# Patient Record
Sex: FEMALE | Race: WHITE
[De-identification: names, ages, dates, MRNs, and addresses within clinical notes are randomized per-mention and may not be internally consistent; named-entity substitution may affect disease eponyms.]

---

## 2020-09-25 ENCOUNTER — HOSPITAL ENCOUNTER (INPATIENT)
Dept: HOSPITAL 75 - ER | Age: 85
LOS: 3 days | Discharge: HOME | DRG: 292 | End: 2020-09-28
Attending: FAMILY MEDICINE | Admitting: FAMILY MEDICINE
Payer: MEDICARE

## 2020-09-25 VITALS — SYSTOLIC BLOOD PRESSURE: 209 MMHG | DIASTOLIC BLOOD PRESSURE: 117 MMHG

## 2020-09-25 VITALS — BODY MASS INDEX: 31.64 KG/M2 | HEIGHT: 62.99 IN | WEIGHT: 178.57 LBS

## 2020-09-25 VITALS — SYSTOLIC BLOOD PRESSURE: 148 MMHG | DIASTOLIC BLOOD PRESSURE: 87 MMHG

## 2020-09-25 VITALS — SYSTOLIC BLOOD PRESSURE: 133 MMHG | DIASTOLIC BLOOD PRESSURE: 83 MMHG

## 2020-09-25 DIAGNOSIS — J96.10: ICD-10-CM

## 2020-09-25 DIAGNOSIS — Z99.81: ICD-10-CM

## 2020-09-25 DIAGNOSIS — Z83.3: ICD-10-CM

## 2020-09-25 DIAGNOSIS — J44.1: ICD-10-CM

## 2020-09-25 DIAGNOSIS — E87.6: ICD-10-CM

## 2020-09-25 DIAGNOSIS — I50.33: ICD-10-CM

## 2020-09-25 DIAGNOSIS — F17.210: ICD-10-CM

## 2020-09-25 DIAGNOSIS — F17.200: ICD-10-CM

## 2020-09-25 DIAGNOSIS — Z20.828: ICD-10-CM

## 2020-09-25 DIAGNOSIS — E03.9: ICD-10-CM

## 2020-09-25 DIAGNOSIS — I11.0: Primary | ICD-10-CM

## 2020-09-25 DIAGNOSIS — H35.30: ICD-10-CM

## 2020-09-25 DIAGNOSIS — I48.91: ICD-10-CM

## 2020-09-25 LAB
ALBUMIN SERPL-MCNC: 4 GM/DL (ref 3.2–4.5)
ALP SERPL-CCNC: 81 U/L (ref 40–136)
ALT SERPL-CCNC: 22 U/L (ref 0–55)
BASOPHILS # BLD AUTO: 0 10^3/UL (ref 0–0.1)
BASOPHILS NFR BLD AUTO: 0 % (ref 0–10)
BILIRUB SERPL-MCNC: 0.6 MG/DL (ref 0.1–1)
BUN/CREAT SERPL: 17
CALCIUM SERPL-MCNC: 8.8 MG/DL (ref 8.5–10.1)
CHLORIDE SERPL-SCNC: 102 MMOL/L (ref 98–107)
CO2 SERPL-SCNC: 23 MMOL/L (ref 21–32)
CREAT SERPL-MCNC: 0.72 MG/DL (ref 0.6–1.3)
EOSINOPHIL # BLD AUTO: 0 10^3/UL (ref 0–0.3)
EOSINOPHIL NFR BLD AUTO: 0 % (ref 0–10)
GFR SERPLBLD BASED ON 1.73 SQ M-ARVRAT: > 60 ML/MIN
GLUCOSE SERPL-MCNC: 106 MG/DL (ref 70–105)
HCT VFR BLD CALC: 36 % (ref 35–52)
HGB BLD-MCNC: 10.8 G/DL (ref 11.5–16)
LYMPHOCYTES # BLD AUTO: 1.5 10^3/UL (ref 1–4)
LYMPHOCYTES NFR BLD AUTO: 20 % (ref 12–44)
MAGNESIUM SERPL-MCNC: 1.8 MG/DL (ref 1.6–2.4)
MANUAL DIFFERENTIAL PERFORMED BLD QL: NO
MCH RBC QN AUTO: 27 PG (ref 25–34)
MCHC RBC AUTO-ENTMCNC: 30 G/DL (ref 32–36)
MCV RBC AUTO: 91 FL (ref 80–99)
MONOCYTES # BLD AUTO: 0.3 10^3/UL (ref 0–1)
MONOCYTES NFR BLD AUTO: 5 % (ref 0–12)
NEUTROPHILS # BLD AUTO: 5.4 10^3/UL (ref 1.8–7.8)
NEUTROPHILS NFR BLD AUTO: 75 % (ref 42–75)
PLATELET # BLD: 258 10^3/UL (ref 130–400)
PMV BLD AUTO: 10.1 FL (ref 9–12.2)
POTASSIUM SERPL-SCNC: 4.7 MMOL/L (ref 3.6–5)
PROT SERPL-MCNC: 6.6 GM/DL (ref 6.4–8.2)
SODIUM SERPL-SCNC: 137 MMOL/L (ref 135–145)
WBC # BLD AUTO: 7.3 10^3/UL (ref 4.3–11)

## 2020-09-25 PROCEDURE — 94760 N-INVAS EAR/PLS OXIMETRY 1: CPT

## 2020-09-25 PROCEDURE — 80053 COMPREHEN METABOLIC PANEL: CPT

## 2020-09-25 PROCEDURE — 93306 TTE W/DOPPLER COMPLETE: CPT

## 2020-09-25 PROCEDURE — 83880 ASSAY OF NATRIURETIC PEPTIDE: CPT

## 2020-09-25 PROCEDURE — 85025 COMPLETE CBC W/AUTO DIFF WBC: CPT

## 2020-09-25 PROCEDURE — 90662 IIV NO PRSV INCREASED AG IM: CPT

## 2020-09-25 PROCEDURE — 83735 ASSAY OF MAGNESIUM: CPT

## 2020-09-25 PROCEDURE — 93005 ELECTROCARDIOGRAM TRACING: CPT

## 2020-09-25 PROCEDURE — 94640 AIRWAY INHALATION TREATMENT: CPT

## 2020-09-25 PROCEDURE — 87635 SARS-COV-2 COVID-19 AMP PRB: CPT

## 2020-09-25 PROCEDURE — 36415 COLL VENOUS BLD VENIPUNCTURE: CPT

## 2020-09-25 PROCEDURE — 84484 ASSAY OF TROPONIN QUANT: CPT

## 2020-09-25 PROCEDURE — 71045 X-RAY EXAM CHEST 1 VIEW: CPT

## 2020-09-25 PROCEDURE — 84145 PROCALCITONIN (PCT): CPT

## 2020-09-25 RX ADMIN — ENOXAPARIN SODIUM SCH MG: 100 INJECTION SUBCUTANEOUS at 19:49

## 2020-09-25 RX ADMIN — ALBUTEROL SULFATE SCH PUFF: 90 AEROSOL, METERED RESPIRATORY (INHALATION) at 18:53

## 2020-09-25 RX ADMIN — ALBUTEROL SULFATE SCH PUFF: 90 AEROSOL, METERED RESPIRATORY (INHALATION) at 21:57

## 2020-09-25 RX ADMIN — Medication SCH ML: at 22:00

## 2020-09-25 RX ADMIN — FUROSEMIDE SCH MG: 10 INJECTION, SOLUTION INTRAVENOUS at 19:50

## 2020-09-25 NOTE — CONSULTATION-CARDIOLOGY
HPI-Cardiology


Cardiology Consultation:


Date of Consultation


20


Date of Admission





Attending Physician


Jaz Johnston MD


Admitting Physician





Consulting Physician


CAMERON STEVEN MD





HPI:


Time Seen by a Provider:  16:02


Chief Complaint:


shortness of breath


this is a 86-year-old lady who has previous history of chronic diastolic c

ongestive heart failure, COPD on oxygen therapy, hypertension, hypothyroidism.  

She presented to the ER with complains of shortness of breath and weakness.  

Symptoms were continuing for a week or so.  She has previous history of pleural 

effusions requiring thoracentesis.  She denies chest pain syncope, near-syncope.

 She is an active smoker.  Family history is positive for diabetes.





Review of Systems-Cardiology


Review of Systems


Constitutional:  As described under HPI; No As described under HPI, No no 

symptoms reported, No chills, No fever, No lightheadedness; tiredness


Eyes:  No As described under HPI, No no symptoms reported, No blindness, No 

blurred vision, No contact lenses, No drainage, No decreased acuity, No foreign 

body sensation, No pain, No vision change


Ears/Nose/Throat:  No As described under HPI, No no symptoms reported, No 

chronic hearing loss, No ear discharge, No ear pain, No nasal drainage, No 

ulcerations


Respiratory:  No no symptoms reported; As described under HPI; No As described 

under HPI, No cough, No orthopnea; shortness of breath; No SOB with excertion


Cardiovascular:  No no symptoms reported; As described under HPI; No As 

described under HPI, No chest pain, No edema, No irregular heart rate, No 

lightheadedness, No palpitations


Gastrointestinal:  No no symptoms reported, No As described under HPI, No 

abdomen distended, No abdominal pain, No blood streaked bowels, No constipation,

No diarrhea, No nausea, No vomiting, No stool coloration changes


Genitourinary:  No As described under HPI, No burning, No dysuria, No discharge,

No frequency, No flank pain, No hematuria, No urgency


Pregnant:  Yes


Pregnant:  No


Skin:  No rash, No skin related problems, No ulcerations


Psychiatric/Neurological:  No anxiety, No depression, No seizure, No focal weakn

ess, No syncope


Hematologic:  No bleeding abnormalities





PMH-Social-Family Hx


Patient Social History


Alcohol Use:  Denies Use


Recreational Drug Use:  No


Smoking Status:  Former Smoker


Recent Foreign Travel:  No


Recent Infectious Disease Expo:  No


Hospitalization with Isolation:  Denies





Immunizations Up To Date


Date of Pneumonia Vaccine:  Sep 2, 2019





Past Medical History


PMH


As described under Assessment.





Allergies and Home Medications


Allergies


Coded Allergies:  


     Tetracyclines (Verified  Allergy, Unknown, 20)


Uncoded Allergies:  


     PCN (Allergy, Unknown, 20)





Patient Home Medication List


Home Medication List Reviewed:  Yes





Physical Exam-Cardiology


Physical Exam


Vital Signs/I&O











 20





 07:00 07:58 08:00 08:15


 


Temp    36.2


 


Pulse 65   76


 


Resp    18


 


B/P (MAP)    140/64 (89)


 


Pulse Ox  97 95 95


 


O2 Delivery  Nasal Cannula Nasal Cannula Nasal Cannula


 


O2 Flow Rate  3.00 3.00 3.00


 


    





 20





 11:09 11:57 12:33 15:00


 


Temp  36.8  


 


Pulse  74 86 


 


Resp  18  


 


B/P (MAP)  127/60 (82)  


 


Pulse Ox 94 94  95


 


O2 Delivery Nasal Cannula Nasal Cannula  Nasal Cannula


 


O2 Flow Rate 3.00 3.00  3.00


 


    





 20   





 16:13   


 


Temp 36.2   


 


Pulse 69   


 


Resp 22   


 


B/P (MAP) 135/70 (91)   


 


Pulse Ox 96   


 


O2 Delivery Nasal Cannula   


 


O2 Flow Rate 3.00   














 20





 00:00


 


Intake Total 520 ml


 


Output Total 2300 ml


 


Balance -1780 ml





Capillary Refill : Less Than 3 Seconds


Constitutional:  AAO x 3


HEENT:  PERRL; No discharge; hearing is well preserved, oral hygience is good; 

No ulceration, No xanthelasmas are seen


Neck:  No carotid bruit; carotid pulses are 2 + bilaterally


Respiratory:  chest is bilaterally symmetric, lungs clear to auscultation


Cardiovascular:  regular rate-rhythm, S1 and S2; No diastolic murmur; systolic 

murmur


Gastrointestinal:  soft, audible bowel sounds; No spleenomegaly


Rectal:  deferred


Extremities:  normal range of motion, non-tender, normal inspection; No 

clubbing, No cyanosis; no lower extremity edema bilateral; No significant edema


Neurologic/Psychiatric:  no motor/sensory deficits, alert, normal mood/affect, 

oriented x 3, power is 5/5 both on sides


Skin:  normal color, warm/dry; No rash, No ulcerations





Data Review


Labs


Laboratory Tests


20 18:18: Troponin I < 0.028


20 00:00: 


White Blood Count 7.6, Red Blood Count 4.21, Hemoglobin 11.5, Hematocrit 37, 

Mean Corpuscular Volume 88, Mean Corpuscular Hemoglobin 27, Mean Corpuscular 

Hemoglobin Concent 31L, Red Cell Distribution Width 14.4, Platelet Count 263, 

Mean Platelet Volume 9.5, Immature Granulocyte % (Auto) 0, Neutrophils (%) 

(Auto) 63, Lymphocytes (%) (Auto) 29, Monocytes (%) (Auto) 7, Eosinophils (%) 

(Auto) 1, Basophils (%) (Auto) 0, Neutrophils # (Auto) 4.8, Lymphocytes # (Auto)

2.2, Monocytes # (Auto) 0.6, Eosinophils # (Auto) 0.1, Basophils # (Auto) 0.0, 

Immature Granulocyte # (Auto) 0.0, Sodium Level 140, Potassium Level 3.7, 

Chloride Level 98, Carbon Dioxide Level 31, Anion Gap 11, Blood Urea Nitrogen 

16, Creatinine 0.83, Estimat Glomerular Filtration Rate > 60, BUN/Creatinine 

Ratio 19, Glucose Level 96, Calcium Level 9.2, Corrected Calcium 9.5, Total 

Bilirubin 0.6, Aspartate Amino Transf (AST/SGOT) 25, Alanine Aminotransferase 

(ALT/SGPT) 18, Alkaline Phosphatase 69, Total Protein 6.4, Albumin 3.6





A/P-Cardiology


Assessment/Admission Diagnosis


shortness of breath, 


weakness,


COPD exacerbation,


Pleural effusion,


acute on Chronic diastolic congestive heart failure,


Active smoking,





Plan


shortness of breath, likely multifactorial with COPD and acute on chronic 

diastolic congestive heart failure.  We'll give Lasix.





weakness,defer to the primary team.





COPD exacerbation,inhalers.





Pleural effusion,general surgery has been consulted for possible thoracentesis 

if required.





acute on Chronic diastolic congestive heart failure,request Lasix.  

Echocardiogram.





Active smoking,








Thank you for your consultation. Please call me if you have any questions.








JANY Steven MD, FACP, FACC, FSCAI, FHRS, CCDS


Interventional Cardiology


Cardiac Electrophysiology


Vascular Medicine and Endovascular Interventions





Clinical Quality Measures


DVT/VTE Risk/Contraindication:


Risk Factor Score Per Nursin


RFS Level Per Nursing on Admit:  4+=Very High











CAMERON STEVEN MD             Sep 25, 2020 16:02

## 2020-09-25 NOTE — DIAGNOSTIC IMAGING REPORT
INDICATION: Dyspnea. Shortness of air.



COMPARISON: None.



FINDINGS: Single frontal radiographic view of the chest was

obtained and demonstrates mild cardiomegaly and pulmonary

vascular congestion. Pulmonary interstitium is also diffusely

prominent. Additionally, there are bibasilar effusions, mild to

moderate on the right and small on the left. There is associated

compressive atelectasis. No pneumothorax is seen. Osseous

structures show no acute abnormalities.



IMPRESSION:

1. Cardiomegaly with sequela of CHF including interstitial

pulmonary edema and bibasilar pleural effusions, right greater

than left.



Dictated by: 



  Dictated on workstation # [id number]

## 2020-09-25 NOTE — ED DYSPNEA
General


Stated Complaint:  SHORTNESS OF BREATH


Source of Information:  Patient


Exam Limitations:  No Limitations





History of Present Illness


Date Seen by Provider:  Sep 25, 2020


Time Seen by Provider:  11:09


Initial Comments


to ER by EMS from home with reports of shortness of breath getting progressively

worse over the past 1 week. She has a history of congestive heart failure and 

states that she gets fluid on her lungs and has to have it drained. Typically 

her care is at [facility]. She has a history of COPD. She wears oxygen at 4 L per 

nasal cannula around the clock. No fevers. She has had nausea for the past week.

But no vomiting. No cough. She has been to [employer] in [facility].


Timing/Duration:  1 Week


Severity:  Moderate





Allergies and Home Medications


Allergies


Coded Allergies:  


     Tetracyclines (Verified  Allergy, Unknown, 9/25/20)


Uncoded Allergies:  


     PCN (Allergy, Unknown, 9/25/20)





Patient Home Medication List


Home Medication List Reviewed:  Yes





Review of Systems


Review of Systems


Constitutional:  see HPI; No chills, No fever; weakness


EENTM:  see HPI


Respiratory:  see HPI, dyspnea on exertion


Cardiovascular:  see HPI; No chest pain, No edema, No Hx of Intervention, No 

syncope, No vascular heart diseas


Gastrointestinal:  No abdominal pain, No diarrhea; nausea; No vomiting


Genitourinary:  no symptoms reported


Musculoskeletal:  no symptoms reported


Skin:  no symptoms reported


Psychiatric/Neurological:  No Symptoms Reported


Endocrine:  No Symptoms Reported





Physical Exam


Vital Signs





Vital Signs - First Documented








 9/25/20





 10:50


 


Temp 36.3


 


Pulse 78


 


Resp 32


 


B/P (MAP) 209/117 (147)


 


Pulse Ox 98





Capillary Refill :


Height, Weight, BMI


Height: '"


Weight: lbs. oz. kg;  BMI


Method:


General Appearance:  No Apparent Distress, WD/WN, Other (speaks in full 

sentences, alert and oriented very pleasant. Converses appropriately. No 

distress no accessory muscle use. Diminished and crackles bibasilar. Oxygen 

saturation 97% on 3 L (her baseline is 4 L). She is afebrile. She is quite 

hypertensive at about 210/120.)


Neck:  Full Range of Motion, Normal Inspection


Respiratory:  No Accessory Muscle Use, No Respiratory Distress


Cardiovascular:  Regular Rate, Rhythm


Gastrointestinal:  Non Tender, Soft


Neurologic/Psychiatric:  Alert, Oriented x3


Skin:  Normal Color, Warm/Dry





Progress/Results/Core Measures


Results/Orders


Lab Results





Laboratory Tests








Test


 9/25/20


10:54 Range/Units


 


 


White Blood Count


 7.3 


 4.3-11.0


10^3/uL


 


Red Blood Count


 3.95 


 3.80-5.11


10^6/uL


 


Hemoglobin 10.8 L 11.5-16.0  g/dL


 


Hematocrit 36  35-52  %


 


Mean Corpuscular Volume 91  80-99  fL


 


Mean Corpuscular Hemoglobin 27  25-34  pg


 


Mean Corpuscular Hemoglobin


Concent 30 L


 32-36  g/dL





 


Red Cell Distribution Width 14.5  10.0-14.5  %


 


Platelet Count


 258 


 130-400


10^3/uL


 


Mean Platelet Volume 10.1  9.0-12.2  fL


 


Immature Granulocyte % (Auto) 0   %


 


Neutrophils (%) (Auto) 75  42-75  %


 


Lymphocytes (%) (Auto) 20  12-44  %


 


Monocytes (%) (Auto) 5  0-12  %


 


Eosinophils (%) (Auto) 0  0-10  %


 


Basophils (%) (Auto) 0  0-10  %


 


Neutrophils # (Auto)


 5.4 


 1.8-7.8


10^3/uL


 


Lymphocytes # (Auto)


 1.5 


 1.0-4.0


10^3/uL


 


Monocytes # (Auto)


 0.3 


 0.0-1.0


10^3/uL


 


Eosinophils # (Auto)


 0.0 


 0.0-0.3


10^3/uL


 


Basophils # (Auto)


 0.0 


 0.0-0.1


10^3/uL


 


Immature Granulocyte # (Auto)


 0.0 


 0.0-0.1


10^3/uL


 


Sodium Level 137  135-145  MMOL/L


 


Potassium Level 4.7  3.6-5.0  MMOL/L


 


Chloride Level 102    MMOL/L


 


Carbon Dioxide Level 23  21-32  MMOL/L


 


Anion Gap 12  5-14  MMOL/L


 


Blood Urea Nitrogen 12  7-18  MG/DL


 


Creatinine


 0.72 


 0.60-1.30


MG/DL


 


Estimat Glomerular Filtration


Rate > 60 


  





 


BUN/Creatinine Ratio 17   


 


Glucose Level 106 H   MG/DL


 


Calcium Level 8.8  8.5-10.1  MG/DL


 


Corrected Calcium 8.8  8.5-10.1  MG/DL


 


Magnesium Level 1.8  1.6-2.4  MG/DL


 


Total Bilirubin 0.6  0.1-1.0  MG/DL


 


Aspartate Amino Transf


(AST/SGOT) 36 H


 5-34  U/L





 


Alanine Aminotransferase


(ALT/SGPT) 22 


 0-55  U/L





 


Alkaline Phosphatase 81    U/L


 


Troponin I < 0.028  <0.028  NG/ML


 


B-Type Natriuretic Peptide 1168.9 H <100.0  PG/ML


 


Total Protein 6.6  6.4-8.2  GM/DL


 


Albumin 4.0  3.2-4.5  GM/DL


 


Procalcitonin 0.01  <0.10  NG/ML


 


Coronavirus 2019 (ZENY) Negative  Negative  








My Orders





Orders - RASHEL ANN


Covid 19 Inhouse Test (9/25/20 11:07)


Coronavirus Sars-Cov-2 So 2019 (9/25/20 11:07)


Cbc With Automated Diff (9/25/20 11:07)


Comprehensive Metabolic Panel (9/25/20 11:07)


Troponin I (9/25/20 11:07)


Ekg Tracing (9/25/20 11:07)


BNP (9/25/20 11:07)


Magnesium (9/25/20 11:07)


Chest 1 View, Ap/Pa Only (9/25/20 11:07)


Procalcitonin (Pct) (9/25/20 11:50)


Hydralazine Injection (Apresoline Inject (9/25/20 12:30)


Furosemide  Injection (Lasix  Injection) (9/25/20 12:30)





Medications Given in ED





Current Medications








 Medications  Dose


 Ordered  Sig/Hussein


 Route  Start Time


 Stop Time Status Last Admin


Dose Admin


 


 Aspirin  81 mg  STK-MED ONCE


 .ROUTE  9/25/20 10:57


 9/25/20 11:02 DC 9/25/20 11:06


81 MG


 


 Nitroglycerin  0.4 mg  STK-MED ONCE


 SL  9/25/20 10:57


 9/25/20 11:02 DC 9/25/20 11:06


0.4 MG








Vital Signs/I&O











 9/25/20





 10:50


 


Temp 36.3


 


Pulse 78


 


Resp 32


 


B/P (MAP) 209/117 (147)


 


Pulse Ox 98











Departure


Communication (Admissions)


Time/Spoke to Admitting Phy:  12:59


Spoke with Dr. Chau, we'll admit, spoke with Lisbet from Cath Lab as Dr. Steven 

was in a procedure. Notified him of consult. I'll go ahead and write for an 

echocardiogram today since she's never been here before, Lasix 40 mg IV twice a 

day, supplemental potassium, prophylactic Lovenox, home dose of losartan and 

when necessary hydralazine. She does feel better after the nitroglycerin 

sublingual in regards to her shortness of breath. I will also consult Dr. Carmona

in case the right pleural effusion could be tapped.





Impression





   Primary Impression:  


   CHF (congestive heart failure)


   Qualified Codes:  I50.9 - Heart failure, unspecified


Disposition:  09 ADMITTED AS INPATIENT


Condition:  Stable





Admissions


Decision to Admit Reason:  Admit from ER (General)


Decision to Admit/Date:  Sep 25, 2020


Time/Decision to Admit Time:  12:47











RASHEL ANN             Sep 25, 2020 11:13

## 2020-09-25 NOTE — NUR
LOIS SINGH admitted to room 420-1, with an admitting diagnosis of CHF, PLUERAL 
EFFUSIONS, PUI, on 09/25/20 from AM via , accompanied by STAFF .LOIS SINGH introduced to 
surroundings, call light, bed controls, phone, TV, temperature control, lights, meal times, 
smoking policy, visitor policy, side rail policy, bathrooms and showers.  Patient Rights 
given to patient in the handbook. LOIS SINGH verbalizes understanding that [facility] 
is not responsible for the loss or damage to any personal effects or valuables that are kept 
in the patients posession during their hospitalization.  LOIS SINGH verbalizes 
understanding of Interdisciplinary Patient Education. Patient and/or family were informed 
about the Rapid Response Team and its purpose.

## 2020-09-26 VITALS — SYSTOLIC BLOOD PRESSURE: 140 MMHG | DIASTOLIC BLOOD PRESSURE: 64 MMHG

## 2020-09-26 VITALS — DIASTOLIC BLOOD PRESSURE: 74 MMHG | SYSTOLIC BLOOD PRESSURE: 167 MMHG

## 2020-09-26 VITALS — DIASTOLIC BLOOD PRESSURE: 68 MMHG | SYSTOLIC BLOOD PRESSURE: 140 MMHG

## 2020-09-26 VITALS — DIASTOLIC BLOOD PRESSURE: 65 MMHG | SYSTOLIC BLOOD PRESSURE: 155 MMHG

## 2020-09-26 VITALS — DIASTOLIC BLOOD PRESSURE: 70 MMHG | SYSTOLIC BLOOD PRESSURE: 135 MMHG

## 2020-09-26 VITALS — SYSTOLIC BLOOD PRESSURE: 194 MMHG | DIASTOLIC BLOOD PRESSURE: 84 MMHG

## 2020-09-26 VITALS — DIASTOLIC BLOOD PRESSURE: 60 MMHG | SYSTOLIC BLOOD PRESSURE: 127 MMHG

## 2020-09-26 LAB
ALBUMIN SERPL-MCNC: 3.6 GM/DL (ref 3.2–4.5)
ALP SERPL-CCNC: 69 U/L (ref 40–136)
ALT SERPL-CCNC: 18 U/L (ref 0–55)
BASOPHILS # BLD AUTO: 0 10^3/UL (ref 0–0.1)
BASOPHILS NFR BLD AUTO: 0 % (ref 0–10)
BILIRUB SERPL-MCNC: 0.6 MG/DL (ref 0.1–1)
BUN/CREAT SERPL: 19
CALCIUM SERPL-MCNC: 9.2 MG/DL (ref 8.5–10.1)
CHLORIDE SERPL-SCNC: 98 MMOL/L (ref 98–107)
CO2 SERPL-SCNC: 31 MMOL/L (ref 21–32)
CREAT SERPL-MCNC: 0.83 MG/DL (ref 0.6–1.3)
EOSINOPHIL # BLD AUTO: 0.1 10^3/UL (ref 0–0.3)
EOSINOPHIL NFR BLD AUTO: 1 % (ref 0–10)
GFR SERPLBLD BASED ON 1.73 SQ M-ARVRAT: > 60 ML/MIN
GLUCOSE SERPL-MCNC: 96 MG/DL (ref 70–105)
HCT VFR BLD CALC: 37 % (ref 35–52)
HGB BLD-MCNC: 11.5 G/DL (ref 11.5–16)
LYMPHOCYTES # BLD AUTO: 2.2 10^3/UL (ref 1–4)
LYMPHOCYTES NFR BLD AUTO: 29 % (ref 12–44)
MANUAL DIFFERENTIAL PERFORMED BLD QL: NO
MCH RBC QN AUTO: 27 PG (ref 25–34)
MCHC RBC AUTO-ENTMCNC: 31 G/DL (ref 32–36)
MCV RBC AUTO: 88 FL (ref 80–99)
MONOCYTES # BLD AUTO: 0.6 10^3/UL (ref 0–1)
MONOCYTES NFR BLD AUTO: 7 % (ref 0–12)
NEUTROPHILS # BLD AUTO: 4.8 10^3/UL (ref 1.8–7.8)
NEUTROPHILS NFR BLD AUTO: 63 % (ref 42–75)
PLATELET # BLD: 263 10^3/UL (ref 130–400)
PMV BLD AUTO: 9.5 FL (ref 9–12.2)
POTASSIUM SERPL-SCNC: 3.7 MMOL/L (ref 3.6–5)
PROT SERPL-MCNC: 6.4 GM/DL (ref 6.4–8.2)
SODIUM SERPL-SCNC: 140 MMOL/L (ref 135–145)
WBC # BLD AUTO: 7.6 10^3/UL (ref 4.3–11)

## 2020-09-26 RX ADMIN — Medication SCH ML: at 21:00

## 2020-09-26 RX ADMIN — ALBUTEROL SULFATE SCH PUFF: 90 AEROSOL, METERED RESPIRATORY (INHALATION) at 07:58

## 2020-09-26 RX ADMIN — FUROSEMIDE SCH MG: 10 INJECTION, SOLUTION INTRAVENOUS at 06:05

## 2020-09-26 RX ADMIN — ALBUTEROL SULFATE SCH PUFF: 90 AEROSOL, METERED RESPIRATORY (INHALATION) at 02:11

## 2020-09-26 RX ADMIN — POTASSIUM CHLORIDE SCH MEQ: 1500 TABLET, EXTENDED RELEASE ORAL at 06:04

## 2020-09-26 RX ADMIN — MELATONIN 3 MG ORAL TABLET PRN MG: 3 TABLET ORAL at 20:51

## 2020-09-26 RX ADMIN — ALBUTEROL SULFATE SCH PUFF: 90 AEROSOL, METERED RESPIRATORY (INHALATION) at 15:00

## 2020-09-26 RX ADMIN — Medication SCH ML: at 14:07

## 2020-09-26 RX ADMIN — LEVOTHYROXINE SODIUM SCH MCG: 0.09 TABLET ORAL at 06:04

## 2020-09-26 RX ADMIN — Medication SCH ML: at 06:05

## 2020-09-26 RX ADMIN — MELATONIN 3 MG ORAL TABLET PRN MG: 3 TABLET ORAL at 01:28

## 2020-09-26 RX ADMIN — ALBUTEROL SULFATE SCH PUFF: 90 AEROSOL, METERED RESPIRATORY (INHALATION) at 11:09

## 2020-09-26 RX ADMIN — ENOXAPARIN SODIUM SCH MG: 100 INJECTION SUBCUTANEOUS at 20:52

## 2020-09-26 RX ADMIN — ALBUTEROL SULFATE SCH PUFF: 90 AEROSOL, METERED RESPIRATORY (INHALATION) at 23:13

## 2020-09-26 RX ADMIN — LOSARTAN POTASSIUM SCH MG: 50 TABLET, FILM COATED ORAL at 09:03

## 2020-09-26 RX ADMIN — ALBUTEROL SULFATE SCH PUFF: 90 AEROSOL, METERED RESPIRATORY (INHALATION) at 18:21

## 2020-09-26 NOTE — HISTORY & PHYSICAL-HOSPITALIST
History of Present Illness


HPI/Chief Complaint


Pt is an 86yoCF with a PMH of CHF, COPD on chronic oxygen, HTN, hypothyroidism 

who presented to the ER due to weakness and SOB. She states that her symptoms 

have been going on for about a week. She had a previous similar episode in May 

and had a pleural effusion where 800mL was drained. She is requested repeat 

thoracentesis today. She is currently on 3lpm, down from her baseline of 4lpm. 

She has no specifics complaints today and thinks her breathing is better. She 

denies any fevers or cough. She reports compliance with her home medications. 

She is a former smoker who quit in  but occasionally still smokes 'like Bill

Afshin, I don't inhale."


Source:  patient


Date Seen


20


Time Seen by a Provider:  12:22


Attending Physician


Steven Rodriguez MD


PCP





Referring Physician





Date of Admission


Sep 25, 2020 at 13:05





Home Medications & Allergies


Home Medications


Reviewed patient Home Medication Reconciliation performed by pharmacy medication

reconciliations technician and/or nursing.


Patients Allergies have been reviewed.





Allergies





Allergies


Coded Allergies


  Tetracyclines (Verified Allergy, Unknown, 20)


Uncoded Allergies


  PCN ( Allergy, Unknown, 20)








Past Medical-Social-Family Hx


Past Med/Social Hx:  Reviewed Nursing Past Med/Soc Hx


Patient Social History


Employed/Student:  retired


Alcohol Use:  Denies Use


Recreational Drug Use:  No


Smoking Status:  Current Someday Smoker


Former Smoker, Quit:  2000


Recent Foreign Travel:  No


Contact w/other who traveled:  No


Recent Hopitalizations:  No


Recent Infectious Disease Expo:  No





Immunizations Up To Date


Date of Pneumonia Vaccine:  Sep 2, 2019





Past Medical History


Respiratory:  COPD


Cardiac:  Atrial Fibrillation, Hypertension


Endocrine:  Hypothyroidsim


HEENT:  Macular Degeneration





Family History


Diabetes





Review of Systems


Constitutional:  No chills, No fever; malaise, weakness


EENTM:  no symptoms reported


Respiratory:  No cough; dyspnea on exertion, orthopnea; No phlegm; short of 

breath


Cardiovascular:  No chest pain, No edema, No palpitations


Gastrointestinal:  No abdominal pain, No constipation, No diarrhea; nausea; No 

vomiting


Genitourinary:  No decreased output, No dysuria


Musculoskeletal:  muscle cramps, muscle weakness


Skin:  no symptoms reported


Psychiatric/Neurological:  No Symptoms Reported





Physical Exam


Physical Exam


Vital Signs





Vital Signs - First Documented








 20





 10:50 13:52 15:28


 


Temp 36.3  


 


Pulse 78  


 


Resp 32  


 


B/P (MAP) 209/117 (147)  


 


Pulse Ox 98  


 


O2 Delivery  Nasal Cannula 


 


O2 Flow Rate  4.00 


 


FiO2   32





Capillary Refill : Less Than 3 Seconds


Height, Weight, BMI


Height: '"


Weight: lbs. oz. kg; 31.64 BMI


Method:


General Appearance:  No Apparent Distress, Chronically ill


HEENT:  PERRL/EOMI, Moist Mucous Membranes


Neck:  Normal Inspection, Supple; No JVD


Respiratory:  No Accessory Muscle Use, Decreased Breath Sounds, Other (on 3lpm 

NC)


Cardiovascular:  Regular Rate, Rhythm, No Murmur, Normal Peripheral Pulses


Gastrointestinal:  Normal Bowel Sounds, Non Tender, Soft


Extremity:  Normal Capillary Refill, No Calf Tenderness, No Pedal Edema


Neurologic/Psychiatric:  Alert, Oriented x3, Normal Mood/Affect


Skin:  Normal Color, Warm/Dry





Results


Results/Procedures


Labs


Laboratory Tests


20 10:54








20 00:00








Patient resulted labs reviewed.


Imaging:  Reviewed Imaging Films, Reviewed Imaging Report


Imaging


                 [facility] [place], [place]





NAME:   LOIS SINGH


MED REC#:   [id number]


ACCOUNT#:   [id number]


PT STATUS:   ADM IN


:   1934


PHYSICIAN:   RASHEL ANN


ADMIT DATE:   20/


                                  ***Signed***


Date of Exam:20





CHEST 1 VIEW, AP/PA ONLY








INDICATION: Dyspnea. Shortness of air.





COMPARISON: None.





FINDINGS: Single frontal radiographic view of the chest was


obtained and demonstrates mild cardiomegaly and pulmonary


vascular congestion. Pulmonary interstitium is also diffusely


prominent. Additionally, there are bibasilar effusions, mild to


moderate on the right and small on the left. There is associated


compressive atelectasis. No pneumothorax is seen. Osseous


structures show no acute abnormalities.





IMPRESSION:


1. Cardiomegaly with sequela of CHF including interstitial


pulmonary edema and bibasilar pleural effusions, right greater


than left.





Dictated by: 





  Dictated on workstation # [id number]








Dict:   [id number]


Trans:   [id number]


[facility] [id number]





Interpreted by:     MARIIA LEBRON MD


Electronically signed by: MARIIA LEBRON MD [id number]





Assessment/Plan


Admission Diagnosis


CHF exacerbation


Admission Status:  Inpatient Order (span 2 midnights)


Reason for Inpatient Admission:  


see below





Assessment and Plan


Acute decompendated CHF


Chronic respiratory failure


pleural effusion


HTN


   BNP elevated, subjectively SOB


   CXR with moderate pleural effusion


   Continue lasix


   Repeat CXR this morning to assess response to Lasix


   Discussed with Dr Carmona- hopefully will be able to hold off on thoracentesis

if good response to Lasix


   Cardiology consulted, appreciate recs


   Troponin neg x2


   Echo pending


   COVID PCR negative





COPD without acute exacerbation


Chronic Respiratory Failure


   Continue home oxygen- actually on less then normal dose


   Resume home inhalers when med rec done





Hypothyroidism


   Continue home synthroid 





DVT ppx: Lovenox





Diagnosis/Problems


Diagnosis/Problems





(1) Respiratory failure


Qualifiers:  


   Chronicity:  chronic  Respiratory failure complication:  hypoxia  Qualified 

Codes:  J96.11 - Chronic respiratory failure with hypoxia


(2) Hypothyroidism


Status:  Chronic


Qualifiers:  


   Hypothyroidism type:  unspecified  Qualified Codes:  E03.9 - Hypothyroidism, 

unspecified


(3) Pleural effusion


Status:  Acute


(4) CHF (congestive heart failure)


Status:  Acute


Qualifiers:  


   Heart failure type:  unspecified  Heart failure chronicity:  acute  Qualified

Codes:  I50.9 - Heart failure, unspecified


(5) Essential (primary) hypertension


(6) Person under investigation for COVID-19





Clinical Quality Measures


DVT/VTE Risk/Contraindication:


Risk Factor Score Per Nursin


RFS Level Per Nursing on Admit:  4+=Very High











STEVEN RODRIGUEZ MD              Sep 26, 2020 12:27

## 2020-09-26 NOTE — CONSULTATION - SURGERY
History of Present Illness


History of Present Illness


Patient Consulted On(meño/time)


20


 11:27


Time Seen by Provider:  10:18


History of Present Illness


Surgery asked to consult regarding pulmonary effusion, possible thoracentesis.





HPI per ED:  to ER by EMS from home with reports of shortness of breath getting 

progressively worse over the past 1 week. She has a history of congestive heart 

failure and states that she gets fluid on her lungs and has to have it drained. 

Typically her care is at [place]. She has a history of COPD. She wears oxygen at

4 L per nasal cannula around the clock. No fevers. She has had nausea for the 

past week. But no vomiting. No cough. [facility]

in [facility].


Timing/Duration:  1 Week


Severity:  Moderate





When I spoke to pt this morning she kept saying she felt so much better when 

they igor fluid off in [place]; "they got off 800ml and I felt better right 

away".  She states she doesn't want to wait for Lasix to fix the problem.  She 

thinks her breathing is ok today, better than yesterday but slightly worse than 

normal.  She denies chest pain.





Allergies and Home Medications


Allergies


Coded Allergies:  


     Tetracyclines (Verified  Allergy, Unknown, 20)


Uncoded Allergies:  


     PCN (Allergy, Unknown, 20)





Patient Home Medication List


Home Medication List Reviewed:  Yes





Past Medical-Social-Family Hx


Patient Social History


Alcohol Use:  Denies Use


Recreational Drug Use:  No


Smoking Status:  Former Smoker


Former Smoker, Quit:  2000


Recent Foreign Travel:  No


Contact w/Someone Who Travel:  No


Recent Infectious Disease Expo:  No


Recent Hopitalizations:  No





Immunizations Up To Date


Date of Pneumonia Vaccine:  Sep 2, 2019





Surgeries


History of Surgeries:  Yes





Respiratory


History of Respiratory Disorde:  Yes


Respiratory Disorders:  COPD





Cardiovascular


History of Cardiac Disorders:  Yes (CHF)


Cardiac Disorders:  Atrial Fibrillation, Hypertension





Neurological


History of Neurological Disord:  No





Genitourinary


History of Genitourinary Disor:  No





Gastrointestinal


History of Gastrointestinal Di:  No





Musculoskeletal


History of Musculoskeletal Dis:  No





Endocrine


History of Endocrine Disorders:  No





HEENT


History of HEENT Disorders:  Yes


HEENT Disorders:  Macular Degeneration





Cancer


History of Cancer:  No





Psychosocial


History of Psychiatric Problem:  No





Integumentary


History of Skin or Integumenta:  No





Family Medical History


Significant Family History:  Diabetes (brother and son)





Review of Systems-General


Constitutional:  malaise, weakness


EENTM:  No blurred vision, No double vision, No mouth pain, No mouth swelling, 

No epistaxis


Respiratory:  No cough; dyspnea on exertion; No hemoptysis; short of breath


Cardiovascular:  No chest pain; palpitations


Gastrointestinal:  No abdominal pain, No nausea, No vomiting


Genitourinary:  No dysuria, No frequency, No hematuria


Musculoskeletal:  joint pain, joint swelling, muscle stiffness


Skin:  No change in color, No change in hair/nails


Psychiatric/Neurological:  Denies Anxiety, Denies Depressed, Denies Seizure; 

Weakness


Other


pt denies any hx of abnormal bleeding or bruising





Physical Exam-General Problems


Physical Exam


Vital Signs





Vital Signs - First Documented








 20





 10:50 13:52 15:28


 


Temp 36.3  


 


Pulse 78  


 


Resp 32  


 


B/P (MAP) 209/117 (147)  


 


Pulse Ox 98  


 


O2 Delivery  Nasal Cannula 


 


O2 Flow Rate  4.00 


 


FiO2   32





Capillary Refill : Less Than 3 Seconds


General Appearance:  no apparent distress, thin


Eyes:  Bilateral Eye PERRL, Bilateral Eye EOMI


HEENT:  pharynx normal; No scleral icterus (R), No scleral icterus (L)


Neck:  non-tender, supple


Respiratory:  no respiratory distress, no accessory muscle use, decreased breath

sounds (right base), crackles (right base)


Cardiovascular:  regular rate, rhythm, no JVD


Gastrointestinal:  non tender, soft, no organomegaly, no pulsatile mass


Back:  no CVA tenderness, no vertebral tenderness


Extremities:  no pedal edema, no calf tenderness, normal capillary refill


Neurologic/Psychiatric:  CNs II-XII nml as tested, no motor/sensory deficits, 

alert, normal mood/affect, oriented x 3


Skin:  normal color, warm/dry


Lymphatic:  no adenopathy (neck, axilla or groin)





Data Review


Labs


Laboratory Tests


20 18:18: Troponin I < 0.028


20 00:00: 


White Blood Count 7.6, Red Blood Count 4.21, Hemoglobin 11.5, Hematocrit 37, 

Mean Corpuscular Volume 88, Mean Corpuscular Hemoglobin 27, Mean Corpuscular 

Hemoglobin Concent 31L, Red Cell Distribution Width 14.4, Platelet Count 263, 

Mean Platelet Volume 9.5, Immature Granulocyte % (Auto) 0, Neutrophils (%) 

(Auto) 63, Lymphocytes (%) (Auto) 29, Monocytes (%) (Auto) 7, Eosinophils (%) 

(Auto) 1, Basophils (%) (Auto) 0, Neutrophils # (Auto) 4.8, Lymphocytes # (Auto)

2.2, Monocytes # (Auto) 0.6, Eosinophils # (Auto) 0.1, Basophils # (Auto) 0.0, 

Immature Granulocyte # (Auto) 0.0, Sodium Level 140, Potassium Level 3.7, 

Chloride Level 98, Carbon Dioxide Level 31, Anion Gap 11, Blood Urea Nitrogen 

16, Creatinine 0.83, Estimat Glomerular Filtration Rate > 60, BUN/Creatinine Rat

io 19, Glucose Level 96, Calcium Level 9.2, Corrected Calcium 9.5, Total 

Bilirubin 0.6, Aspartate Amino Transf (AST/SGOT) 25, Alanine Aminotransferase 

(ALT/SGPT) 18, Alkaline Phosphatase 69, Total Protein 6.4, Albumin 3.6





Radiology


Date of Exam:20





CHEST 1 VIEW, AP/PA ONLY








INDICATION: Dyspnea. Shortness of air.





COMPARISON: None.





FINDINGS: Single frontal radiographic view of the chest was


obtained and demonstrates mild cardiomegaly and pulmonary


vascular congestion. Pulmonary interstitium is also diffusely


prominent. Additionally, there are bibasilar effusions, mild to


moderate on the right and small on the left. There is associated


compressive atelectasis. No pneumothorax is seen. Osseous


structures show no acute abnormalities.





IMPRESSION:


1. Cardiomegaly with sequela of CHF including interstitial


pulmonary edema and bibasilar pleural effusions, right greater


than left.





Dictated by: 





  Dictated on workstation # [id number]








Dict:   [id number]


Trans:   20 [id number]


[facility] [id number]





Interpreted by:     MARIIA LEBRON MD


Electronically signed by: MARIIA LEBRON MD 20 [id number]





Assessment/Plan


CHF


Pulmonary Effusion








Pt does not appear to be in any respiratory distress and the CXR from this am 

looks better than yesterday.  I believe the Lasix is working and feel it is best

to avoid tapping pt because of the risk of Pneumothorax.  I will follow along if

anything changes.





Clinical Quality Measures


DVT/VTE Risk/Contraindication:


Risk Factor Score Per Nursin


RFS Level Per Nursing on Admit:  4+=Very High











JUMANA VILCHIS DO               Sep 26, 2020 11:32

## 2020-09-26 NOTE — CARDIOLOGY PROGRESS NOTE
Cardiology SOAP Progress Note


Subjective:


improved shortness of breath.  She does not like Lasix.





Objective:


I&O/Vital Signs











 9/26/20 9/26/20 9/26/20 9/26/20





 07:00 07:58 08:00 08:15


 


Temp    36.2


 


Pulse 65   76


 


Resp    18


 


B/P (MAP)    140/64 (89)


 


Pulse Ox  97 95 95


 


O2 Delivery  Nasal Cannula Nasal Cannula Nasal Cannula


 


O2 Flow Rate  3.00 3.00 3.00


 


    





 9/26/20 9/26/20 9/26/20 9/26/20





 11:09 11:57 12:33 15:00


 


Temp  36.8  


 


Pulse  74 86 


 


Resp  18  


 


B/P (MAP)  127/60 (82)  


 


Pulse Ox 94 94  95


 


O2 Delivery Nasal Cannula Nasal Cannula  Nasal Cannula


 


O2 Flow Rate 3.00 3.00  3.00


 


    





 9/26/20   





 16:13   


 


Temp 36.2   


 


Pulse 69   


 


Resp 22   


 


B/P (MAP) 135/70 (91)   


 


Pulse Ox 96   


 


O2 Delivery Nasal Cannula   


 


O2 Flow Rate 3.00   














 9/26/20





 00:00


 


Intake Total 520 ml


 


Output Total 2300 ml


 


Balance -1780 ml








Constitutional:  AAO x 3


Respiratory:  chest is bilaterally symmetric, lungs clear to auscultation


Cardiovascular:  regular rate-rhythm, S1 and S2; No diastolic murmur; systolic 

murmur


Gastrointestional:  soft, audible bowel sounds; No spleenomegaly


Extremities:  normal range of motion, non-tender, normal inspection; No 

clubbing, No cyanosis; no lower extremity edema bilateral; No significant edema


Neurologic/Psychiatric:  no motor/sensory deficits, alert, normal mood/affect, 

oriented x 3, power is 5/5 both on sides


Skin:  normal color, warm/dry; No rash, No ulcerations





Results/Procedures:


Labs


Laboratory Tests


9/25/20 18:18: Troponin I < 0.028


9/26/20 00:00: 


White Blood Count 7.6, Red Blood Count 4.21, Hemoglobin 11.5, Hematocrit 37, 

Mean Corpuscular Volume 88, Mean Corpuscular Hemoglobin 27, Mean Corpuscular 

Hemoglobin Concent 31L, Red Cell Distribution Width 14.4, Platelet Count 263, 

Mean Platelet Volume 9.5, Immature Granulocyte % (Auto) 0, Neutrophils (%) 

(Auto) 63, Lymphocytes (%) (Auto) 29, Monocytes (%) (Auto) 7, Eosinophils (%) 

(Auto) 1, Basophils (%) (Auto) 0, Neutrophils # (Auto) 4.8, Lymphocytes # (Auto)

2.2, Monocytes # (Auto) 0.6, Eosinophils # (Auto) 0.1, Basophils # (Auto) 0.0, 

Immature Granulocyte # (Auto) 0.0, Sodium Level 140, Potassium Level 3.7, 

Chloride Level 98, Carbon Dioxide Level 31, Anion Gap 11, Blood Urea Nitrogen 

16, Creatinine 0.83, Estimat Glomerular Filtration Rate > 60, BUN/Creatinine 

Ratio 19, Glucose Level 96, Calcium Level 9.2, Corrected Calcium 9.5, Total 

Bilirubin 0.6, Aspartate Amino Transf (AST/SGOT) 25, Alanine Aminotransferase 

(ALT/SGPT) 18, Alkaline Phosphatase 69, Total Protein 6.4, Albumin 3.6








A/P:


Assessment/Dx:


shortness of breath, 


weakness,


COPD exacerbation,


Pleural effusion,


acute on Chronic diastolic congestive heart failure,


Active smoking,


hypokalemia


Plan:


shortness of breath, likely multifactorial with COPD and acute on chronic 

diastolic congestive heart failure.  improved with Lasix.





weakness,defer to the primary team.





COPD exacerbation,inhalers.





Pleural effusion,general surgery has been consulted for possible thoracentesis 

if required.





acute on Chronic diastolic congestive heart failure,request Lasix.  

Echocardiogram shows normal LV function with mild diastolic dysfunction. patient

is requesting not  to be given Lasix.  She is not in florid congestive heart 

failure.  I will hold Lasix for now.





Active smoking,





hypokalemia, replace.








Thank you for your consultation. Please call me if you have any questions.








JANY Steven MD, FACP, FACC, FSCAI, FHRS, CCDS


Interventional Cardiology


Cardiac Electrophysiology


Vascular Medicine and Endovascular Interventions











CAMERON STEVEN MD             Sep 26, 2020 17:25

## 2020-09-26 NOTE — DIAGNOSTIC IMAGING REPORT
INDICATION:  Pleural effusion.



TECHNIQUE:  Single view chest 10:52 AM.



CORRELATION STUDY:  09/25/2020



FINDINGS: 

Heart size is enlarged. Vasculature overall improved with only

mild congestion remaining.  

There has been some generalized improvement in aeration of the

lung fields, likely owing to decreased edema. Opacification in

the right lung base, likely a combination of effusion along with

consolidation or atelectasis, the lower lung distribution does

remain. Small left pleural effusion.

Prior kyphoplasty changes of the spine.



IMPRESSION: 

1. Overall, improved appearance about the chest with what appears

to be a decrease in the severity of a congestive heart failure.

Combination of effusion along with consolidation and/or edema at

the right lung base does persist along with small left pleural

effusion.



  Dictated on workstation # [id number]

## 2020-09-27 VITALS — DIASTOLIC BLOOD PRESSURE: 76 MMHG | SYSTOLIC BLOOD PRESSURE: 158 MMHG

## 2020-09-27 VITALS — DIASTOLIC BLOOD PRESSURE: 48 MMHG | SYSTOLIC BLOOD PRESSURE: 164 MMHG

## 2020-09-27 VITALS — SYSTOLIC BLOOD PRESSURE: 145 MMHG | DIASTOLIC BLOOD PRESSURE: 66 MMHG

## 2020-09-27 VITALS — DIASTOLIC BLOOD PRESSURE: 59 MMHG | SYSTOLIC BLOOD PRESSURE: 126 MMHG

## 2020-09-27 VITALS — DIASTOLIC BLOOD PRESSURE: 68 MMHG | SYSTOLIC BLOOD PRESSURE: 150 MMHG

## 2020-09-27 VITALS — DIASTOLIC BLOOD PRESSURE: 75 MMHG | SYSTOLIC BLOOD PRESSURE: 130 MMHG

## 2020-09-27 RX ADMIN — Medication SCH ML: at 06:12

## 2020-09-27 RX ADMIN — ALBUTEROL SULFATE SCH PUFF: 90 AEROSOL, METERED RESPIRATORY (INHALATION) at 02:37

## 2020-09-27 RX ADMIN — Medication SCH ML: at 15:46

## 2020-09-27 RX ADMIN — ALBUTEROL SULFATE SCH GM: 90 AEROSOL, METERED RESPIRATORY (INHALATION) at 19:17

## 2020-09-27 RX ADMIN — LEVOTHYROXINE SODIUM SCH MCG: 0.09 TABLET ORAL at 06:11

## 2020-09-27 RX ADMIN — Medication SCH ML: at 19:34

## 2020-09-27 RX ADMIN — POTASSIUM CHLORIDE SCH MEQ: 1500 TABLET, EXTENDED RELEASE ORAL at 06:11

## 2020-09-27 RX ADMIN — ENOXAPARIN SODIUM SCH MG: 100 INJECTION SUBCUTANEOUS at 19:33

## 2020-09-27 RX ADMIN — LOSARTAN POTASSIUM SCH MG: 50 TABLET, FILM COATED ORAL at 08:40

## 2020-09-27 RX ADMIN — ALBUTEROL SULFATE SCH GM: 90 AEROSOL, METERED RESPIRATORY (INHALATION) at 14:36

## 2020-09-27 RX ADMIN — ALBUTEROL SULFATE SCH GM: 90 AEROSOL, METERED RESPIRATORY (INHALATION) at 06:33

## 2020-09-27 NOTE — PROGRESS NOTE - SURGERY
Subjective


Time Seen by a Provider:  09:21


Subjective/Events-last exam


Pt seen and examined, states she is breathing fine; but still asks about 

draining the fluid.  She also wants to know if she is going home today.


Review of Systems


General:  Fatigue, Malaise


Pulmonary:  Dyspnea; No Cough


Cardiovascular:  No: Chest Pain, Palpitations


Gastrointestinal:  No: Nausea, Vomiting, Abdominal Pain





Objective


Exam





Vital Signs








  Date Time  Temp Pulse Resp B/P (MAP) Pulse Ox O2 Delivery O2 Flow Rate FiO2


 


20 08:00 36.2 76 20 158/76 (103) 98 Nasal Cannula 3.00 


 


20 08:00      Nasal Cannula 3.00 


 


20 07:00  76      


 


20 06:33     95 Nasal Cannula 3.00 


 


20 05:55 36.2 68   98   32


 


20 04:00 36.2 68 18 150/68 (95) 98 Nasal Cannula 3.00 


 


20 02:39     96 Nasal Cannula 3.00 


 


20 01:00  75      


 


20 23:30 36.5 70 18 140/68 (92) 96 Nasal Cannula 3.00 


 


20 23:14      Nasal Cannula 3.00 


 


20 20:00     96 Nasal Cannula 3.00 


 


20 19:39 36.5 80 16 167/74 (105) 95 Nasal Cannula 3.00 


 


20 19:00  80      


 


20 18:22     98 Nasal Cannula 3.00 


 


20 16:13 36.2 69 22 135/70 (91) 96 Nasal Cannula 3.00 


 


20 15:00     95 Nasal Cannula 3.00 


 


20 12:33  86      


 


20 11:57 36.8 74 18 127/60 (82) 94 Nasal Cannula 3.00 


 


20 11:09     94 Nasal Cannula 3.00 














I & O 


 


 20





 07:00


 


Intake Total 520 ml


 


Output Total 950 ml


 


Balance -430 ml





Capillary Refill : Less Than 3 Seconds


General Appearance:  No Apparent Distress, Chronically ill


HEENT:  PERRL/EOMI, Moist Mucous Membranes


Respiratory:  No Accessory Muscle Use, Crackles, Decreased Breath Sounds (right 

base), Wheezing


Cardiovascular:  Regular Rate, Rhythm, No Murmur


Gastrointestinal:  non tender, soft, no organomegaly, no pulsatile mass


Extremity:  No Calf Tenderness, No Pedal Edema


Neurologic/Psychiatric:  Alert, Oriented x3, Normal Mood/Affect


Skin:  Normal Color, Warm/Dry





Assessment/Plan


CHF


Pulmonary Effusion








Pt does not appear to be in any respiratory distress and the CXR from this am 

looks better than yesterday.  I believe the Lasix is working and feel it is best

to avoid tapping pt because of the risk of Pneumothorax.  I will sign off.





Clinical Quality Measures


DVT/VTE Risk/Contraindication:


Risk Factor Score Per Nursin


RFS Level Per Nursing on Admit:  4+=Very High











JUMANA VILCHIS DO               Sep 27, 2020 10:14

## 2020-09-27 NOTE — PROGRESS NOTE - HOSPITALIST
Subjective


HPI/CC On Admission


Date Seen by Provider:  Sep 27, 2020


Time Seen by Provider:  13:07


Pt is an 86yoCF with a PMH of CHF, COPD on chronic oxygen, HTN, hypothyroidism 

who presented to the ER due to weakness and SOB. She states that her symptoms 

have been going on for about a week. She had a previous similar episode in May 

and had a pleural effusion where 800mL was drained. She is requested repeat 

thoracentesis today. She is currently on 3lpm, down from her baseline of 4lpm. 

She has no specifics complaints today and thinks her breathing is better. She 

denies any fevers or cough. She reports compliance with her home medications. 

She is a former smoker who quit in  but occasionally still smokes 'like Bill

Afshin, I don't inhale.


Subjective/Events-last exam


Pt reports doing well today. Would actually like to DC home today. Lives alone. 

has not yet been up much in her room and still has abraham.





Objective


Exam


Vital Signs





Vital Signs








  Date Time  Temp Pulse Resp B/P (MAP) Pulse Ox O2 Delivery O2 Flow Rate FiO2


 


20 12:31  83      


 


20 12:00 36.2  20 145/66 (92) 97 Nasal Cannula 3.00 


 


20 05:55        32





Capillary Refill : Less Than 3 Seconds


General Appearance:  No Apparent Distress, WD/WN, Chronically ill


Respiratory:  Lungs Clear, No Accessory Muscle Use, Other (NC 3lpm)


Cardiovascular:  Regular Rate, Rhythm, No Murmur


Neurologic/Psychiatric:  Alert, Oriented x3





Results/Procedures


Lab


Patient resulted labs reviewed.


Imaging:  Reviewed Imaging Films, Reviewed Imaging Report





Assessment/Plan


Assessment and Plan


Assess & Plan/Chief Complaint


Acutely decompensated dCHF


Chronic respiratory failure


pleural effusion


HTN


   SOB improving, requesting no IV lasix, will give oral


   CXR improved


   Repeat CXR in the morning to assess response to Lasix


   Cardiology consulted, appreciate recs


   Troponin neg x2


   Echo with preserved EF and diastolic function noted


   COVID PCR negative





COPD without acute exacerbation


Chronic Respiratory Failure


   Continue home oxygen- actually on less then normal dose


   Resume home inhalers





Hypothyroidism


   Continue home synthroid 





Weakness


   PT/OT in AM


   Discussed home health but patient does not seem interested


   





DVT ppx: Lovenox





Diagnosis/Problems


Diagnosis/Problems





(1) Respiratory failure


Qualifiers:  


   Chronicity:  chronic  Respiratory failure complication:  hypoxia  Qualified 

Codes:  J96.11 - Chronic respiratory failure with hypoxia


(2) Hypothyroidism


Status:  Chronic


Qualifiers:  


   Hypothyroidism type:  unspecified  Qualified Codes:  E03.9 - Hypothyroidism, 

unspecified


(3) Pleural effusion


Status:  Acute


(4) CHF (congestive heart failure)


Status:  Acute


Qualifiers:  


   Heart failure type:  unspecified  Heart failure chronicity:  acute  Qualified

Codes:  I50.9 - Heart failure, unspecified


(5) Essential (primary) hypertension


(6) Person under investigation for COVID-19





Clinical Quality Measures


DVT/VTE Risk/Contraindication:


Risk Factor Score Per Nursin


RFS Level Per Nursing on Admit:  4+=Very High











STEVEN RODRIGUEZ MD              Sep 27, 2020 13:12

## 2020-09-27 NOTE — CARDIOLOGY PROGRESS NOTE
Cardiology SOAP Progress Note


Subjective:


no significant shortness of breath.





Objective:


I&O/Vital Signs











 9/27/20 9/27/20 9/27/20 9/27/20





 04:00 05:55 06:33 07:00


 


Temp 36.2 36.2  


 


Pulse 68 68  76


 


Resp 18   


 


B/P (MAP) 150/68 (95)   


 


Pulse Ox 98 98 95 


 


O2 Delivery Nasal Cannula  Nasal Cannula 


 


O2 Flow Rate 3.00  3.00 


 


FiO2  32  


 


    





 9/27/20 9/27/20 9/27/20 9/27/20





 08:00 08:00 12:00 12:31


 


Temp  36.2 36.2 


 


Pulse  76 75 83


 


Resp  20 20 


 


B/P (MAP)  158/76 (103) 145/66 (92) 


 


Pulse Ox  98 97 


 


O2 Delivery Nasal Cannula Nasal Cannula Nasal Cannula 


 


O2 Flow Rate 3.00 3.00 3.00 


 


    





 9/27/20 9/27/20  





 14:36 15:22  


 


Temp  36.9  


 


Pulse  81  


 


Resp  22  


 


B/P (MAP)  126/59 (81)  


 


Pulse Ox 95 94  


 


O2 Delivery Nasal Cannula Nasal Cannula  


 


O2 Flow Rate 3.00 3.00  














 9/27/20





 00:00


 


Intake Total 470 ml


 


Output Total 800 ml


 


Balance -330 ml








Constitutional:  AAO x 3


Respiratory:  chest is bilaterally symmetric, lungs clear to auscultation


Cardiovascular:  regular rate-rhythm, S1 and S2; No diastolic murmur; systolic 

murmur


Gastrointestional:  soft, audible bowel sounds; No spleenomegaly


Extremities:  normal range of motion, non-tender, normal inspection; No 

clubbing, No cyanosis; no lower extremity edema bilateral; No significant edema


Neurologic/Psychiatric:  no motor/sensory deficits, alert, normal mood/affect, 

oriented x 3, power is 5/5 both on sides


Skin:  normal color, warm/dry; No rash, No ulcerations





A/P:


Assessment/Dx:


shortness of breath, 


weakness,


COPD exacerbation,


Pleural effusion,


acute on Chronic diastolic congestive heart failure,


Active smoking,


hypokalemia


Plan:


shortness of breath, likely multifactorial with COPD and acute on chronic 

diastolic congestive heart failure.  improved with Lasix.





weakness,defer to the primary team.





COPD exacerbation,inhalers.





Pleural effusion,general surgery has been consulted for possible thoracentesis 

if required.





acute on Chronic diastolic congestive heart failure,request Lasix.  

Echocardiogram shows normal LV function with mild diastolic dysfunction. patient

is requesting not  to be given Lasix.  She is not in florid congestive heart 

failure.  I will hold Lasix for now.





Active smoking,





hypokalemia, replace.








Thank you for your consultation. Please call me if you have any questions.








JANY Steven MD, FACP, FACC, FSCAI, FHRS, CCDS


Interventional Cardiology


Cardiac Electrophysiology


Vascular Medicine and Endovascular Interventions











CAMERON STEVEN MD             Sep 27, 2020 15:42

## 2020-09-28 VITALS — SYSTOLIC BLOOD PRESSURE: 140 MMHG | DIASTOLIC BLOOD PRESSURE: 70 MMHG

## 2020-09-28 RX ADMIN — LOSARTAN POTASSIUM SCH MG: 50 TABLET, FILM COATED ORAL at 09:05

## 2020-09-28 RX ADMIN — POTASSIUM CHLORIDE SCH MEQ: 1500 TABLET, EXTENDED RELEASE ORAL at 05:51

## 2020-09-28 RX ADMIN — ALBUTEROL SULFATE SCH GM: 90 AEROSOL, METERED RESPIRATORY (INHALATION) at 02:09

## 2020-09-28 RX ADMIN — LEVOTHYROXINE SODIUM SCH MCG: 0.09 TABLET ORAL at 05:51

## 2020-09-28 RX ADMIN — Medication SCH ML: at 05:52

## 2020-09-28 RX ADMIN — ALBUTEROL SULFATE SCH GM: 90 AEROSOL, METERED RESPIRATORY (INHALATION) at 07:20

## 2020-09-28 RX ADMIN — ALBUTEROL SULFATE SCH GM: 90 AEROSOL, METERED RESPIRATORY (INHALATION) at 14:33

## 2020-09-28 NOTE — DIAGNOSTIC IMAGING REPORT
EXAMINATION: Chest 1 view



HISTORY: Heart failure



COMPARISON: 09/26/2020



FINDINGS: 



There are small pleural effusions with small lung volumes. There

is mild edema. Heart is mildly enlarged. No pneumothorax.



IMPRESSION: 



1. Mild edema and small pleural effusions.



Dictated by: 



  Dictated on workstation # [id number]

## 2020-09-28 NOTE — NUR
CM/SS visited with the patient for social service consult. 



Plan: The patient will return home at time of discharge. No needs. 



Home: The patient reports that she lives in a 3-level home. She states that she has a 
electric wheelchair for the stairs that helps her. The patient lives alone and has for the 
past 2 years. She does not feel that she needs assistance in the home due to her large 
support system. 



Home Health: Home health was suggested by physician to patient. The patient denies the need 
for home health. She states that she had [facility] in the past and was discharged from their 
services. The patient reports that she does not feel that she needs it at this time. 



Equipment: The patient reports that she has multiple walkers and has 3 wheelchairs in the 
home. She does not use them all the time but has them there if she needs it. The patient 
wears oxygen at baseline. She is unsure of the company name but it is located in [place]. 
She states that she has multiple tanks, 2 concentrators, and an emergency tank.  



Supports: The patient reports that she has her younger siblings, grand children, and great 
grand children that are willing and able to assist when needed. She states that she cannot 
drive but her grandson lives a couple miles down the road drives her along with her sister 
Loraine. The patient states that her Daughter-in-law Shanae is also involved with her care. Her 
son passed but she is still close with Shanae. 



Meals: Patient denies the need for a meal delivery service at this time. She states that 
family helps with getting groceries and writing out recipes due to her eye condition. 



No further needs at this time. 

-------------------------------------------------------------------------------

Addendum: 09/28/20 at 1015 by IVANA TENORIO

-------------------------------------------------------------------------------

The patient reports that her sister Loraine will bring her portable oxygen tank for discharge. 

-------------------------------------------------------------------------------

Addendum: 09/28/20 at 1526 by IVANA TENORIO

-------------------------------------------------------------------------------

YU/JONA spoke with Dr. Sozua's office. They will approve patient and her appointment is set 
up for Wednesday the 30th at 1:00 p.m.

## 2020-09-28 NOTE — NUR
I SPOKE WITH THE PATIENT AND WENT THROUGH THE EXTERNAL MED HISTORY TO COMPLETE THE MED REC.



PATIENT TRIES TO TAKE CHEWABLE OR GUMMY VITAMINS WHENEVER POSSIBLE



OTC:

CALCIUM GUMMY

VITAMIN C

VITAMIN D3

WOMEN'S DAILY VITAMIN

MELATONIN

TYLENOL LIQUID

BABY ASPIRIN

## 2020-09-28 NOTE — CARDIOLOGY PROGRESS NOTE
Subjective


Date Seen by Provider:  Sep 28, 2020


Time Seen by Provider:  09:00


Subjective/Events-last exam


Patient is sitting up in chair, denies any chest pain or dyspnea.


Review of Systems


General:  No Chills, No Night Sweats, No Fatigue, No Malaise, No Appetite, No 

Other


HEENT:  No Head Aches, No Visual Changes, No Eye Pain, No Ear Pain, No 

Dysphasia, No Sinus Congestion, No Post Nasal Drip, No Sore Throat, No Other


Pulmonary:  Dyspnea


Cardiovascular:  No: Chest Pain, Palpitations, Orthopnea, Paroxysmal Noc. 

Dyspnea, Edema, Lt Headedness, Other





Objective-Cardiology


Exam


Last Set of Vital Signs





Vital Signs








 20





 05:55 08:00 09:00


 


Temp  35.8 


 


Pulse  68 


 


Resp  16 


 


B/P (MAP)  140/70 (93) 


 


Pulse Ox  98 


 


O2 Delivery   Nasal Cannula


 


O2 Flow Rate   3.00


 


FiO2 32  





Capillary Refill : Less Than 3 Seconds


I&O











Intake and Output 


 


 20





 00:00


 


Intake Total 2515 ml


 


Output Total 1400 ml


 


Balance 1115 ml


 


 


 


Intake Oral 2515 ml


 


Output Urine Total 1400 ml


 


# Voids 2








General:  Alert, Oriented X3, Cooperative


HEENT:  Atraumatic, PERRLA


Neck:  Supple, No JVD, No Thyromegaly


Lungs:  Other (decreased breath sounds bibasilarly)


Heart:  Regular Rate, Normal S1, Normal S2


Abdomen:  Normal Bowel Sounds, Soft


Extremities:  No Clubbing, No Edema


Skin:  No Rashes, No Significant Lesion


Neuro:  Normal Speech, Cranial Nerves 3-12 NL


Psych/Mental Status:  Mental Status NL, Mood NL





A/P-Cardiology


Admission Diagnosis


Diastolic CHF


AE COPD


Pleural effusion


HTN





Assessment/Plan


Shortness of breath, likely multifactorial with COPD and acute on chronic 

diastolic congestive heart failure.  improved with Lasix. Continue to monitor





COPD exacerbation, improved, continue to monitor.





Pleural effusion, improving. Continue Lasix.





Acute on Chronic diastolic congestive heart failure.  Echocardiogram shows 

normal LV function with mild diastolic dysfunction. Responded well to Lasix





HTN- continue to monitor.





Tobaccoism, Active smoking, discussed smoking cessation





Hypokalemia, replaced and improved, continue to monitor.





Patient was seen and evaluated with Jodi, examination performed, management 

plan was discussed, agree with the current scribed note, I made few changes to 

the note using Italic font


Patient is sitting in a chair, using oxygen, reporting that she has been using 

oxygen at home.


Feeling better at this time, breathing is better


Discussed with katy Smith for discharge, will need to be on Lasix as an 

outpatient.





Clinical Quality Measures


DVT/VTE Risk/Contraindication:


Risk Factor Score Per Nursin


RFS Level Per Nursing on Admit:  4+=Very High











JODI JASSO  Sep 28, 2020 09:24


VANDANA PRICE MD              Sep 28, 2020 11:30

## 2020-09-28 NOTE — PHYSICAL THERAPY EVALUATION
PT Evaluation-General


Medical Diagnosis


Admission Date


Sep 25, 2020 at 13:05


Medical Diagnosis:  CHF/pleural effusion


Onset Date:  Sep 25, 2020





Therapy Diagnosis


Therapy Diagnosis:  debility





Precautions


Precautions/Isolations:  Fall Prevention, Standard Precautions





Referral


Physician:  Vickie


Reason for Referral:  Evaluation/Treatment





Medical History


Pertinent Medical History:  COPD, Heart Failure, HTN, Hypothroidism


Current History


EMS from home secondary to SOB x 1 week


Reviewed History:  Yes





Social History


Home:  Multilevel


Current Living Status:  Alone


Entry Into Home:  Stairs With Railing


PT Steps Into Home:  5


PT Steps Inside Home:  15





Prior


Prior Level of Function


SCALE: Activities may be completed with or without assistive devices.





6-Indepedent-patient completes the activity by him/herself with no assistance 

from a helper.


5-Set-up or Clean-up Assistance-helper sets up or cleans up; patient completes 

activity. [place] assists only prior to or  


    following the activity.


4-Supervision or Touching Assistance-helper provides verbal cues and/or 

touching/steadying and/or contact guard assistance as patient completes 

activity. Assistance may be provided   


    throughout the activity or intermittently.


3-Partial/Moderate Assistance-helper does LESS THAN HALF the effort. Ridgely 

lifts, holds or supports trunk or limbs, but provides less than half the effort.


2-Substantial/Maximal Assistance-helper does MORE THAN HALF the effort. Ridg[place] 

lifts or holds trunk or limbs and provides more than half the effort.


2-Sigfunfxw-qcwrhp does ALL the effort. Patient does none of the effort to 

complete the activity. Or, the assistance of 2 or more helpers is required for 

the patient to complete the  


    activity.


If activity was not attempted, code reason:


7-Patient Refused.


9-Not Applicable-not attempted and the patient did not perform the activity 

before the current illness, exacerbation or injury.


10-Not Attempted due to Environmental Limitations-(lack of equipment, weather 

restraints, etc.).


88-Not Attempted due to Medical Conditions or Safety Concerns.


Bed Mobility:  6


Transfers (B,C,W/C):  6


Gait:  6


Stairs:  6


Indoor Mobility (Ambulation):  Independent


Stairs:  Independent


Prior Devices Use:  Walker


Prior Device Use:  4WW occasionally





PT Evaluation-Current


Subjective


Patient agrees to PT.  Is up ad juan in room.





Objective


Patient Orientation:  Normal For Age


Attachments:  Oxygen (4L NC )





ROM/Strength


ROM Lower Extremities


bilateral LE WFL


Strength Lower Extremities


4/5 grossly bilateral LE





Integumentary/Posture


Integumentary


refer to nursing notes


Bowel Incontinence:  No


Bladder Incontinence:  No


Posture


WFL





Neuromuscular


(Tone, Coordination, Reflexes)


grossly intact





Sensory


Vision:  impaired


Hearing:  Impaired





Transfers


Roll Left to Right (QC):  6


Sit to Lying (QC):  6


Lying to Sitting/Side of Bed(Q:  6


Sit to Stand (QC):  6


Chair/Bed-to-Chair Xfer(QC):  6


Toilet Transfer (QC):  6





Gait


Does the Patient Walk?:  Yes


Mode of Locomotion:  Walk


Anticipated Mode of Locomotion:  Walk


Walk 10 feet (QC):  6


Walk 50 ft with 2 Turns(QC):  6


Walk 150 ft (QC):  6


Distance:  200'


Gait Assistive Device:  FWW


Comments/Gait Description


safe and functional with no deviation





Stairs


#of Steps:  15


1 Step (curb) (QC):  6


4 Steps (QC):  6


12 Steps (QC):  6


step to ascending and descending





Balance


Sitting Static:  Normal


Sitting Dynamic:  Normal


Standing Static:  Normal


 Standing Dynamic:  Normal





Assessment/Needs


86 y.o. female, is currently at [facility] with all gross motor skills and

does not require skilled therapy intervention.


Rehab Potential:  Fair





PT Plan


Treatment/Plan


Treatment Plan:  Discontinue PT, goals met


Treatment Duration:  Sep 28, 2020


Frequency:  1 time per week


Estimated Hrs Per Day:  .25 hour per day


Patient and/or Family Agrees t:  Yes





Time/GCodes


Time In:  845


Time Out:  901


Total Billed Treatment Time:  16


Total Billed Treatment


1 visit


[facility]











BONNIE BRAVO PT              Sep 28, 2020 10:21

## 2020-09-28 NOTE — DISCHARGE SUMMARY
Discharge Summary


Hospital Course


Was the Problem List Reviewed?:  Yes


Problems/Dx:  


(1) CHF (congestive heart failure)


Status:  Acute


Qualifiers:  


   Qualified Codes:  I50.33 - Acute on chronic diastolic (congestive) heart 

failure


(2) Respiratory failure


Qualifiers:  


   Qualified Codes:  J96.11 - Chronic respiratory failure with hypoxia


(3) Hypothyroidism


Status:  Chronic


Qualifiers:  


   Qualified Codes:  E03.9 - Hypothyroidism, unspecified


(4) Pleural effusion


Status:  Acute


(5) Essential (primary) hypertension


Hospital Course


Date of Admission: Sep 25, 2020 at 13:05 


Admission Diagnosis :  acute on chronic heart failure with preserved ejection 

fraction





Family Physician/Provider:   





Date of Discharge: 20 


Discharge Diagnosis:  acute on chronic heart failure with preserved ejection 

fraction








Hospital Course:


Tierra Rizvi is an 86-year-old female who presented with shortness of breath 

and was admitted with acute on chronic heart failure with preserved ejection 

fraction.  Cardiology was consulted and assisted with her care.  She underwent 

an echocardiogram which revealed a normal ejection fraction with type I 

diastolic dysfunction.  She was treated with Lasix and improved.  Her oxygen req

uirement remained at her baseline.  She was tested for COVID and was negative.  

She will continue Lasix at home.  She was encouraged to weigh herself daily.  

She should follow-up with her primary care physician in about a week.














Labs and Pending Lab Test:





Home Meds


Active


Reported


Aspirin 81 Mg Tab.chew 81 Mg PO DAILY


Children's Tylenol (Acetaminophen) 160 Mg/5 Ml Oral.susp 5 Ml PO PRN PRN


Melatonin 5 Mg Tablet 5 Mg PO HS


Women's Multivitamin Gummies (Folic Acid/Multivit-Minerals) 200 Mcg Tab.chew 200

Mcg PO DAILY


Vitamin D3 (Cholecalciferol (Vitamin D3)) 125 Mcg Tablet 125 Mcg PO DAILY


Vitamin C 250 mg Tablet Chew (Ascorbic Acid/Ascorbate Sodium) 250 Mg Tab.chew 

250 Mg PO DAILY


Calcium Gummies (Calcium Phosphate Trib/Vit D3) 1 Each Tab.chew 1 Each PO DAILY


Albuterol Sulfate 1.25 Mg/3 Ml Vial.neb 1.25 Mg INH BID


Losartan Potassium 50 Mg Tablet 50 Mg PO 1200


Euthyrox (Levothyroxine Sodium) 88 Mcg Tablet 88 Mcg PO DAILY


Furosemide 40 Mg Tablet 40 Mg PO 1200


Effer-K 10 Meq Tablet Eff (Potassium Bicarbonate/Cit AC) 10 Meq Tablet.eff 10 

Meq PO Q48H


     DISSOLVE 1 TABLET IN 3-4 OUNCES OF COLD WATER


Assessment/Pt Instructions


take medications as prescribed.  Continue to take Lasix.  Weight herself daily. 

If you're weight is increasing, contact her primary care physician to consider 

increasing your Lasix.  Follow-up with your primary care physician in about a 

week.


Discharge Planning:  <30 minutes discharge planning





Discharge Instructions


Discharge Diet:  Low Sodium Diet


Activity as Tolerated:  Yes


Consultations


Cardiology





Discharge Physical Examination


Vital Signs





Vital Signs








  Date Time  Temp Pulse Resp B/P (MAP) Pulse Ox O2 Delivery O2 Flow Rate FiO2


 


20 15:23 35.8 68 16 140/70 96 Nasal Cannula 3.00 


 


20 05:55        32








General Appearance:  No Apparent Distress, Obese


Respiratory:  Lungs Clear, Normal Breath Sounds, No Respiratory Distress


Cardiovascular:  Regular Rate, Rhythm, No Murmur


Gastrointestinal:  Normal Bowel Sounds, Non Tender, Soft


Extremity:  Normal Inspection, Non Tender, Pedal Edema


Skin:  Normal Color, Warm/Dry


Neurologic/Psychiatric:  Alert, Oriented x3, No Motor/Sensory Deficits, Normal 

Mood/Affect


Allergies:  


Coded Allergies:  


     Tetracyclines (Verified  Allergy, Unknown, 20)


Uncoded Allergies:  


     PCN (Allergy, Unknown, 20)





Discharge Summary


Date of Admission


Sep 25, 2020 at 13:05


Date of Discharge


Sep 28, 2020 at 15:23


Discharge Date:  Sep 28, 2020


Discharge Time:  15:23


Admission Diagnosis


CHF exacerbation


Consults/Procedures


Consulations


cardiology





Discharge Diagnosis





(1) CHF (congestive heart failure)


Status:  Acute


Qualifiers:  


   Qualified Codes:  I50.33 - Acute on chronic diastolic (congestive) heart 

failure


(2) Respiratory failure


Qualifiers:  


   Qualified Codes:  J96.11 - Chronic respiratory failure with hypoxia


(3) Hypothyroidism


Status:  Chronic


Qualifiers:  


   Qualified Codes:  E03.9 - Hypothyroidism, unspecified


(4) Pleural effusion


Status:  Acute


(5) Essential (primary) hypertension





Clinical Quality Measures


DVT/VTE Risk/Contraindication:


Risk Factor Score Per Nursin


RFS Level Per Nursing on Admit:  4+=Very High











ELICEO PATEL MD              Sep 28, 2020 18:12

## 2021-01-21 ENCOUNTER — HOSPITAL ENCOUNTER (INPATIENT)
Dept: HOSPITAL 75 - ER | Age: 86
LOS: 4 days | Discharge: HOME HEALTH | DRG: 280 | End: 2021-01-25
Attending: FAMILY MEDICINE | Admitting: FAMILY MEDICINE
Payer: MEDICARE

## 2021-01-21 VITALS — BODY MASS INDEX: 32.89 KG/M2 | HEIGHT: 62.99 IN | WEIGHT: 185.63 LBS

## 2021-01-21 DIAGNOSIS — N39.0: ICD-10-CM

## 2021-01-21 DIAGNOSIS — F17.210: ICD-10-CM

## 2021-01-21 DIAGNOSIS — R00.0: ICD-10-CM

## 2021-01-21 DIAGNOSIS — Z79.82: ICD-10-CM

## 2021-01-21 DIAGNOSIS — I21.A1: ICD-10-CM

## 2021-01-21 DIAGNOSIS — I48.91: ICD-10-CM

## 2021-01-21 DIAGNOSIS — J44.0: ICD-10-CM

## 2021-01-21 DIAGNOSIS — Z79.01: ICD-10-CM

## 2021-01-21 DIAGNOSIS — Z20.822: ICD-10-CM

## 2021-01-21 DIAGNOSIS — E03.9: ICD-10-CM

## 2021-01-21 DIAGNOSIS — J81.1: ICD-10-CM

## 2021-01-21 DIAGNOSIS — J18.9: ICD-10-CM

## 2021-01-21 DIAGNOSIS — I11.0: Primary | ICD-10-CM

## 2021-01-21 DIAGNOSIS — I50.33: ICD-10-CM

## 2021-01-21 DIAGNOSIS — Z88.7: ICD-10-CM

## 2021-01-21 DIAGNOSIS — J96.21: ICD-10-CM

## 2021-01-21 DIAGNOSIS — J44.1: ICD-10-CM

## 2021-01-21 PROCEDURE — 85379 FIBRIN DEGRADATION QUANT: CPT

## 2021-01-21 PROCEDURE — 85610 PROTHROMBIN TIME: CPT

## 2021-01-21 PROCEDURE — 71045 X-RAY EXAM CHEST 1 VIEW: CPT

## 2021-01-21 PROCEDURE — 83615 LACTATE (LD) (LDH) ENZYME: CPT

## 2021-01-21 PROCEDURE — 87804 INFLUENZA ASSAY W/OPTIC: CPT

## 2021-01-21 PROCEDURE — 36415 COLL VENOUS BLD VENIPUNCTURE: CPT

## 2021-01-21 PROCEDURE — 82550 ASSAY OF CK (CPK): CPT

## 2021-01-21 PROCEDURE — 84484 ASSAY OF TROPONIN QUANT: CPT

## 2021-01-21 PROCEDURE — 87186 SC STD MICRODIL/AGAR DIL: CPT

## 2021-01-21 PROCEDURE — 93005 ELECTROCARDIOGRAM TRACING: CPT

## 2021-01-21 PROCEDURE — 82805 BLOOD GASES W/O2 SATURATION: CPT

## 2021-01-21 PROCEDURE — 83874 ASSAY OF MYOGLOBIN: CPT

## 2021-01-21 PROCEDURE — 83880 ASSAY OF NATRIURETIC PEPTIDE: CPT

## 2021-01-21 PROCEDURE — 87040 BLOOD CULTURE FOR BACTERIA: CPT

## 2021-01-21 PROCEDURE — 80061 LIPID PANEL: CPT

## 2021-01-21 PROCEDURE — 82150 ASSAY OF AMYLASE: CPT

## 2021-01-21 PROCEDURE — 87635 SARS-COV-2 COVID-19 AMP PRB: CPT

## 2021-01-21 PROCEDURE — 80053 COMPREHEN METABOLIC PANEL: CPT

## 2021-01-21 PROCEDURE — 94760 N-INVAS EAR/PLS OXIMETRY 1: CPT

## 2021-01-21 PROCEDURE — 99291 CRITICAL CARE FIRST HOUR: CPT

## 2021-01-21 PROCEDURE — 81000 URINALYSIS NONAUTO W/SCOPE: CPT

## 2021-01-21 PROCEDURE — 87081 CULTURE SCREEN ONLY: CPT

## 2021-01-21 PROCEDURE — 86141 C-REACTIVE PROTEIN HS: CPT

## 2021-01-21 PROCEDURE — 83735 ASSAY OF MAGNESIUM: CPT

## 2021-01-21 PROCEDURE — 94640 AIRWAY INHALATION TREATMENT: CPT

## 2021-01-21 PROCEDURE — 82553 CREATINE MB FRACTION: CPT

## 2021-01-21 PROCEDURE — 80048 BASIC METABOLIC PNL TOTAL CA: CPT

## 2021-01-21 PROCEDURE — 71046 X-RAY EXAM CHEST 2 VIEWS: CPT

## 2021-01-21 PROCEDURE — 84145 PROCALCITONIN (PCT): CPT

## 2021-01-21 PROCEDURE — 87088 URINE BACTERIA CULTURE: CPT

## 2021-01-21 PROCEDURE — 84100 ASSAY OF PHOSPHORUS: CPT

## 2021-01-21 PROCEDURE — 93041 RHYTHM ECG TRACING: CPT

## 2021-01-21 PROCEDURE — 85652 RBC SED RATE AUTOMATED: CPT

## 2021-01-21 PROCEDURE — 87077 CULTURE AEROBIC IDENTIFY: CPT

## 2021-01-21 PROCEDURE — 94664 DEMO&/EVAL PT USE INHALER: CPT

## 2021-01-21 PROCEDURE — 51702 INSERT TEMP BLADDER CATH: CPT

## 2021-01-21 PROCEDURE — 85025 COMPLETE CBC W/AUTO DIFF WBC: CPT

## 2021-01-21 PROCEDURE — 94660 CPAP INITIATION&MGMT: CPT

## 2021-01-21 PROCEDURE — 83690 ASSAY OF LIPASE: CPT

## 2021-01-21 PROCEDURE — 85730 THROMBOPLASTIN TIME PARTIAL: CPT

## 2021-01-22 VITALS — SYSTOLIC BLOOD PRESSURE: 150 MMHG | DIASTOLIC BLOOD PRESSURE: 72 MMHG

## 2021-01-22 VITALS — DIASTOLIC BLOOD PRESSURE: 134 MMHG | SYSTOLIC BLOOD PRESSURE: 192 MMHG

## 2021-01-22 LAB
ALBUMIN SERPL-MCNC: 3.4 GM/DL (ref 3.2–4.5)
ALBUMIN SERPL-MCNC: 3.5 GM/DL (ref 3.2–4.5)
ALP SERPL-CCNC: 81 U/L (ref 40–136)
ALP SERPL-CCNC: 83 U/L (ref 40–136)
ALT SERPL-CCNC: 31 U/L (ref 0–55)
ALT SERPL-CCNC: 40 U/L (ref 0–55)
AMYLASE SERPL-CCNC: 71 U/L (ref 25–125)
APTT BLD: 20 SEC (ref 24–35)
APTT PPP: YELLOW S
ARTERIAL PATENCY WRIST A: (no result)
BACTERIA #/AREA URNS HPF: (no result) /HPF
BASE EXCESS STD BLDA CALC-SCNC: -0.2 MMOL/L (ref -2.5–2.5)
BASOPHILS # BLD AUTO: 0 10^3/UL (ref 0–0.1)
BASOPHILS # BLD AUTO: 0 10^3/UL (ref 0–0.1)
BASOPHILS NFR BLD AUTO: 0 % (ref 0–10)
BASOPHILS NFR BLD AUTO: 0 % (ref 0–10)
BDY SITE: (no result)
BILIRUB SERPL-MCNC: 0.4 MG/DL (ref 0.1–1)
BILIRUB SERPL-MCNC: 0.6 MG/DL (ref 0.1–1)
BILIRUB UR QL STRIP: NEGATIVE
BODY TEMPERATURE: 35.2
BUN/CREAT SERPL: 20
BUN/CREAT SERPL: 21
CALCIUM SERPL-MCNC: 8.5 MG/DL (ref 8.5–10.1)
CALCIUM SERPL-MCNC: 8.8 MG/DL (ref 8.5–10.1)
CHLORIDE SERPL-SCNC: 103 MMOL/L (ref 98–107)
CHLORIDE SERPL-SCNC: 103 MMOL/L (ref 98–107)
CHOLEST SERPL-MCNC: 185 MG/DL (ref ?–200)
CK MB SERPL-MCNC: 1.7 NG/ML (ref ?–6.6)
CK SERPL-CCNC: 67 U/L (ref 29–168)
CO2 BLDA CALC-SCNC: 27.7 MMOL/L (ref 21–31)
CO2 SERPL-SCNC: 24 MMOL/L (ref 21–32)
CO2 SERPL-SCNC: 28 MMOL/L (ref 21–32)
CREAT SERPL-MCNC: 0.75 MG/DL (ref 0.6–1.3)
CREAT SERPL-MCNC: 0.75 MG/DL (ref 0.6–1.3)
D DIMER PPP FEU-MCNC: 2.08 UG/ML (ref 0–0.49)
EOSINOPHIL # BLD AUTO: 0 10^3/UL (ref 0–0.3)
EOSINOPHIL # BLD AUTO: 0.1 10^3/UL (ref 0–0.3)
EOSINOPHIL NFR BLD AUTO: 0 % (ref 0–10)
EOSINOPHIL NFR BLD AUTO: 1 % (ref 0–10)
ERYTHROCYTE [SEDIMENTATION RATE] IN BLOOD: 29 MM/HR (ref 0–30)
FIBRINOGEN PPP-MCNC: CLEAR MG/DL
GFR SERPLBLD BASED ON 1.73 SQ M-ARVRAT: > 60 ML/MIN
GFR SERPLBLD BASED ON 1.73 SQ M-ARVRAT: > 60 ML/MIN
GLUCOSE SERPL-MCNC: 102 MG/DL (ref 70–105)
GLUCOSE SERPL-MCNC: 123 MG/DL (ref 70–105)
GLUCOSE UR STRIP-MCNC: NEGATIVE MG/DL
HCT VFR BLD CALC: 33 % (ref 35–52)
HCT VFR BLD CALC: 34 % (ref 35–52)
HDLC SERPL-MCNC: 49 MG/DL (ref 40–60)
HGB BLD-MCNC: 10.1 G/DL (ref 11.5–16)
HGB BLD-MCNC: 10.3 G/DL (ref 11.5–16)
HYALINE CASTS #/AREA URNS LPF: (no result) /LPF
INHALED O2 FLOW RATE: (no result) L/MIN
INR PPP: 0.9 (ref 0.8–1.4)
KETONES UR QL STRIP: NEGATIVE
LEUKOCYTE ESTERASE UR QL STRIP: NEGATIVE
LIPASE SERPL-CCNC: 18 U/L (ref 8–78)
LYMPHOCYTES # BLD AUTO: 0.9 10^3/UL (ref 1–4)
LYMPHOCYTES # BLD AUTO: 2 10^3/UL (ref 1–4)
LYMPHOCYTES NFR BLD AUTO: 10 % (ref 12–44)
LYMPHOCYTES NFR BLD AUTO: 23 % (ref 12–44)
MAGNESIUM SERPL-MCNC: 2 MG/DL (ref 1.6–2.4)
MAGNESIUM SERPL-MCNC: 2.1 MG/DL (ref 1.6–2.4)
MANUAL DIFFERENTIAL PERFORMED BLD QL: NO
MANUAL DIFFERENTIAL PERFORMED BLD QL: NO
MCH RBC QN AUTO: 28 PG (ref 25–34)
MCH RBC QN AUTO: 28 PG (ref 25–34)
MCHC RBC AUTO-ENTMCNC: 30 G/DL (ref 32–36)
MCHC RBC AUTO-ENTMCNC: 30 G/DL (ref 32–36)
MCV RBC AUTO: 91 FL (ref 80–99)
MCV RBC AUTO: 92 FL (ref 80–99)
MONOCYTES # BLD AUTO: 0.6 10^3/UL (ref 0–1)
MONOCYTES # BLD AUTO: 0.6 10^3/UL (ref 0–1)
MONOCYTES NFR BLD AUTO: 7 % (ref 0–12)
MONOCYTES NFR BLD AUTO: 7 % (ref 0–12)
NEUTROPHILS # BLD AUTO: 6.1 10^3/UL (ref 1.8–7.8)
NEUTROPHILS # BLD AUTO: 7.4 10^3/UL (ref 1.8–7.8)
NEUTROPHILS NFR BLD AUTO: 70 % (ref 42–75)
NEUTROPHILS NFR BLD AUTO: 81 % (ref 42–75)
NITRITE UR QL STRIP: NEGATIVE
PCO2 BLDA: 54 MMHG (ref 35–45)
PH BLDA: 7.29 [PH] (ref 7.37–7.43)
PH UR STRIP: 5.5 [PH] (ref 5–9)
PHOSPHATE SERPL-MCNC: 4.3 MG/DL (ref 2.3–4.7)
PLATELET # BLD: 171 10^3/UL (ref 130–400)
PLATELET # BLD: 286 10^3/UL (ref 130–400)
PMV BLD AUTO: 10 FL (ref 9–12.2)
PMV BLD AUTO: 9.5 FL (ref 9–12.2)
PO2 BLDA: 81 MMHG (ref 79–93)
POTASSIUM SERPL-SCNC: 4.1 MMOL/L (ref 3.6–5)
POTASSIUM SERPL-SCNC: 4.6 MMOL/L (ref 3.6–5)
PROT SERPL-MCNC: 6.3 GM/DL (ref 6.4–8.2)
PROT SERPL-MCNC: 7.5 GM/DL (ref 6.4–8.2)
PROT UR QL STRIP: (no result)
PROTHROMBIN TIME: 12.8 SEC (ref 12.2–14.7)
RBC #/AREA URNS HPF: (no result) /HPF
SAO2 % BLDA FROM PO2: 90 % (ref 94–100)
SODIUM SERPL-SCNC: 137 MMOL/L (ref 135–145)
SODIUM SERPL-SCNC: 141 MMOL/L (ref 135–145)
SP GR UR STRIP: >=1.03 (ref 1.02–1.02)
SQUAMOUS #/AREA URNS HPF: (no result) /HPF
TRIGL SERPL-MCNC: 62 MG/DL (ref ?–150)
VENTILATION MODE VENT: NO
VLDLC SERPL CALC-MCNC: 12 MG/DL (ref 5–40)
WBC # BLD AUTO: 8.8 10^3/UL (ref 4.3–11)
WBC # BLD AUTO: 9.1 10^3/UL (ref 4.3–11)
WBC #/AREA URNS HPF: (no result) /HPF
YEAST #/AREA URNS HPF: (no result) /HPF

## 2021-01-22 RX ADMIN — ALBUTEROL SULFATE SCH GM: 90 AEROSOL, METERED RESPIRATORY (INHALATION) at 19:08

## 2021-01-22 RX ADMIN — Medication SCH ML: at 05:26

## 2021-01-22 RX ADMIN — Medication SCH ML: at 20:20

## 2021-01-22 RX ADMIN — ALBUTEROL SULFATE SCH GM: 90 AEROSOL, METERED RESPIRATORY (INHALATION) at 10:50

## 2021-01-22 RX ADMIN — FAMOTIDINE SCH MG: 20 TABLET, FILM COATED ORAL at 20:20

## 2021-01-22 RX ADMIN — Medication SCH ML: at 13:10

## 2021-01-22 RX ADMIN — ALBUTEROL SULFATE SCH GM: 90 AEROSOL, METERED RESPIRATORY (INHALATION) at 10:49

## 2021-01-22 RX ADMIN — FAMOTIDINE SCH MG: 20 TABLET, FILM COATED ORAL at 08:45

## 2021-01-22 RX ADMIN — ALBUTEROL SULFATE SCH GM: 90 AEROSOL, METERED RESPIRATORY (INHALATION) at 15:21

## 2021-01-22 RX ADMIN — ENOXAPARIN SODIUM SCH MG: 100 INJECTION SUBCUTANEOUS at 13:21

## 2021-01-22 NOTE — CONSULTATION-CARDIOLOGY
HPI-Cardiology


Cardiology Consultation:


Date of Consultation


21


Time Seen by a Provider:  08:30


Date of Admission


21


Attending Physician


Elijah Souza MD


Admitting Physician


Elijah Souza MD


Consulting Physician


Jozef Sanders MD





HPI:


Chief Complaint:


Progressive dyspnea


Palpitations


Ms. Rizvi is an 86 yr old female admitted to ICU 8 from the ED with increasing 

SOB.  She states she is oxygen dependant at home.  She reports 2 days ago she 

began to have increasing SOB, palpitations, chest heaviness and gen weakness.  

She reports she was unable to get out of her chair at [facility] and had to call her 

sister to come help her.  She reports the chest discomfort felt like a heaviness

across her chest which was mild to mod.  It lasted for hours.  She reports 

feeling nauseated.  She denies any syncope or near syncope.  She states she was 

unable to walk even across the room d/t worsening SOB.  No c/o LE swelling.  No 

c/o fever or chills.  She reports chest discomfort and palpitations have 

resolved.





Review of Systems-Cardiology


Review of Systems


Constitutional:  No chills, No fever; malaise, tiredness


Eyes:  No vision change


Ears/Nose/Throat:  No epistaxis, No nasal drainage


Respiratory:  As described under HPI


Cardiovascular:  As described under HPI


Gastrointestinal:  No constipation, No diarrhea; nausea; No vomiting


Genitourinary:  No dysuria, No hematuria


Musculoskeletal:  no symptoms reported, other (states she uses a cane or walker)


Skin:  No rash on exposed areas, No ulcerations on exposed areas


Psychiatric/Neurological:  No anxiety, No depression, No seizure, No focal 

weakness, No syncope


Hematologic:  No bleeding abnormalities





PMH-Social-Family Hx


Patient Social History


Smoking Status:  Current Everyday Smoker


2nd Hand Smoke Exposure:  No


Have you traveled recently?:  No


Alcohol Use?:  No


Pt feels they are or have been:  No


Tobacco type used:  Cigarettes





Immunizations Up To Date


Tetanus Booster (TDap):  Unknown


Date of Pneumonia Vaccine:  Sep 2, 2019


Date of Influenza Vaccine:  Sep 28, 2021





Past Medical History


PMH


As described under Assessment.





Family Medical History


Family Medical History:  


sarah Clayton reports her mother and father both had "heart trouble" as well as


her


siblings, but does not know the details.  She reports her mother and


father both had lung cancer as well.





Allergies and Home Medications


Allergies


Coded Allergies:  


     Tetracyclines (Verified  Allergy, Unknown, 20)


Uncoded Allergies:  


     PCN (Allergy, Unknown, 20)





Home Medications


Albuterol Sulfate 1.25 Mg/3 Ml Vial.neb, 1.25 MG INH Q4H PRN for SHORTNESS OF 

BREATH, (Reported)


Albuterol Sulfate 18 Gm Hfa.aer.ad, 1 PUFF IH QID


   Prescribed by: ELIJAH SOUZA on 21


Ascorbic Acid/Ascorbate Sodium 250 Mg Tab.chew, 250 MG PO DAILY, (Reported)


Aspirin 81 Mg Tab.chew, 81 MG PO DAILY, (Reported)


Calcium Phosphate Trib/Vit D3 1 Each Tab.chew, 1 EACH PO DAILY, (Reported)


Cefdinir 300 Mg Capsule, 300 MG PO BID


   Prescribed by: ELIJAH SOUZA on 21


Cetirizine HCl 10 Mg Tablet, 10 MG PO DAILY, (Reported)


Cholecalciferol (Vitamin D3) 125 Mcg Tablet, 125 MCG PO DAILY, (Reported)


Folic Acid/Multivit-Minerals 200 Mcg Tab.chew, 200 MCG PO DAILY, (Reported)


Furosemide 20 Mg Tablet, 20 MG PO DAILY


   Prescribed by: ELIJAH SOUZA on 21


Levothyroxine Sodium 125 Mcg Tablet, 125 MCG PO DAILY, (Reported)


Melatonin 5 Mg Tablet, 5 MG PO HS PRN for SLEEP, (Reported)


Metoprolol Succinate 25 Mg Tab.er.24h, 25 MG PO DAILY


   Prescribed by: ELIJAH SOUZA on 21


Potassium Bicarbonate/Cit AC 10 Meq Tablet.eff, 10 MEQ PO DAILY, (Reported)


   DISSOLVE IN 3-4 OUNCES 





Physical Exam-Cardiology


Physical Exam


Vital Signs/I&O


Capillary Refill : Less Than 3 Seconds


Constitutional:  AAO x 3, well-developed, well-nourished


HEENT:  PERRL, hard of hearing


Neck:  No carotid bruit; carotid pulses are 2 + bilaterally


Respiratory:  No accessory muscle use, No respiratory distress; chest expansion 

is symmetric, chest is bilaterally symmetric, other (diminished lower  lobes)


Cardiovascular:  irregularly irregular; No JVD; S1 and S2


Gastrointestinal:  No tender; soft, round, audible bowel sounds


Extremities:  no lower extremity edema bilateral


Neurologic/Psychiatric:  grossly intact (moves all extremities)


Skin:  No rash on exposed areas, No ulcerations on exposed areas





Data Review


Labs





Microbiology


21 MRSA Screen - Final, Complete


          MRSA not isolated


21 Urine Culture - Final, Complete


          Klebsiella pneumoniae


21 Blood Culture - Preliminary, Resulted


          No growth








Radiology


NAME:   LOIS RIZVI


MED REC#:   [id number]


ACCOUNT#:   [id number]


PT STATUS:   ADM IN


:   1934


PHYSICIAN:   JAKOB RIOS DO


ADMIT DATE:   21/ICU


***Draft***


Date of Exam:21





CHEST 1 VIEW, AP/PA ONLY











EXAMINATION: Chest radiograph, portable AP view.





DATE: 2021 12:28 AM





INDICATION: 86-year-old female, dyspnea, hypoxia.





COMPARISON:  2020.





FINDINGS: Stable overall appearance of the cardiomediastinal


silhouette. There is no identified pneumothorax. There is


nonspecific right basilar airspace consolidation. There is also


nonspecific left basilar airspace consolidation. There are


bilateral interstitial opacities. There are linear opacities in


the right midlung.





IMPRESSION: 


1. Largely unchanged nonspecific bibasilar airspace consolidation


with probable right pleural effusion.


2. Bilateral interstitial opacities which may reflect


interstitial edema or atypical infectious etiology.











  Dictated on workstation # [id number]








Dict:   [id number]


Trans:   [id number]


[facility] [id number]





Interpreted by:     COURTNEY RIDDLE MD


Electronically signed by:





ECG Impression


ECG


Comment


sinus tachycardia with PAC's, PVC's





A/P-Cardiology


Assessment/Admission Diagnosis


Hypoxia - likely multi-factorial r/t pleural effusion, acute on chronic 

diastolic CHF and acute exacerbation of COPD





Mildly elevated troponin, NSTEMI vs Type 2 MI secondary to hypoxia and 

tachycardia





Sinus tachycardia 





Acute on Chronic diastolic congestive heart failure





Echocardiogram in 2020 reported by Dr. Steven as normal left 

ventricular size and function, EF 55-65 percent, grade 1 diastolic dysfunction, 

mildly dilated left atrium, PA pressure 40 mmHg.





H/O right pleural effusion in November and 2020 for which she underwent

throracentesis in 2020 by Dr. García





Acute on chronic exacerbation of COPD





Hypertension - uncontrolled





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





Discussion and Recomendations


Hypoxia - likely multi-factorial (as noted above)


Mildly elevated troponin, NSTEMI vs Type 2 MI secondary to hypoxia and tachy

cardia


Continue ASA


Uncontrolled hypertension with sinus tachycardia - add BB


Acute on chronic diastolic CHF - treat with diuretics


Ac exacerbation of COPD - management per medical/pulmonary services


Monitor lab closely 


Replace electrolytes as indicated


We would like to thank medical services for this consult











TAYLOR PEDRAZA           2021 08:00

## 2021-01-22 NOTE — NUR
Orders received from Dr. Souza to transfer pt to 4th floor and okay to place in standard 
precautions. Report called to Iram PALAFOX. Pt placed on tele box and transferred to [facility] 
without incident. All belongings sent with pt.

## 2021-01-22 NOTE — CONSULTATION-CARDIOLOGY
HPI-Cardiology


Cardiology Consultation:


Date of Consultation


1/22/21


Time Seen by a Provider:  17:00


Date of Admission





Attending Physician


Kimberly Souza MD


Admitting Physician


Kimberly Souza MD


Consulting Physician


CAMILA JOHNSTON MD, MA, FACP, FACC, FSCAI, CCDS





HPI:


Chief Complaint:


CC: Progressive dyspnea, Palpitations





HPI


Ms. Rizvi is an 86 yr old female admitted to ICU 8 from the ED with increasing 

SOB.  She states she is oxygen dependant at home.  She reports 2 days ago she 

began to have increasing SOB, palpitations, chest heaviness and gen weakness.  

She reports she was unable to get out of her chair at [facility] and had to call her 

sister to come help her.  She reports the chest discomfort felt like a heaviness

across her chest which was mild to mod.  It lasted for hours.  She reports 

feeling nauseated.  She denies any syncope or near syncope.  She states she was 

unable to walk even across the room d/t worsening SOB.  No c/o LE swelling.  No 

c/o fever or chills.  She reports chest discomfort and palpitations have 

resolved.





Review of Systems-Cardiology


Review of Systems


Constitutional:  No chills, No fever; malaise, tiredness


Eyes:  No vision change


Ears/Nose/Throat:  No epistaxis, No nasal drainage


Respiratory:  As described under HPI


Cardiovascular:  As described under HPI


Gastrointestinal:  No constipation, No diarrhea; nausea; No vomiting


Genitourinary:  No dysuria, No hematuria


Musculoskeletal:  no symptoms reported, other (states she uses a cane or walker)


Skin:  No rash on exposed areas, No ulcerations on exposed areas


Psychiatric/Neurological:  No anxiety, No depression, No seizure, No focal 

weakness, No syncope


Hematologic:  No bleeding abnormalities





All Other Systems Reviewed


Negative Unless Noted:  Yes





PMH-Social-Family Hx


Patient Social History


Marrital Status:  


Living Status:  LIVES AT HOME ALONE


Employed/Student:  retired


Smoking Status:  Current Everyday Smoker


2nd Hand Smoke Exposure:  No


Have you traveled recently?:  No


Alcohol Use?:  No


Pt feels they are or have been:  No


Tobacco type used:  Cigarettes





Immunizations Up To Date


Tetanus Booster (TDap):  Unknown


Date of Pneumonia Vaccine:  Sep 2, 2019


Date of Influenza Vaccine:  Sep 28, 2021





Past Medical History


PMH


As described under Assessment.





Family Medical History


Family Medical History:  


sarah Clayton reports her mother and father both had "heart trouble" as well as


her


siblings, but does not know the details.  She reports her mother and


father both had lung cancer as well.





Allergies and Home Medications


Allergies


Coded Allergies:  


     Tetracyclines (Verified  Allergy, Unknown, 9/25/20)


Uncoded Allergies:  


     PCN (Allergy, Unknown, 9/25/20)





Home Medications


Albuterol Sulfate 1.25 Mg/3 Ml Vial.neb, 1.25 MG INH Q4H PRN for SHORTNESS OF 

BREATH, (Reported)


Ascorbic Acid/Ascorbate Sodium 250 Mg Tab.chew, 250 MG PO DAILY, (Reported)


Aspirin 81 Mg Tab.chew, 81 MG PO DAILY, (Reported)


Calcium Phosphate Trib/Vit D3 1 Each Tab.chew, 1 EACH PO DAILY, (Reported)


Cetirizine HCl 10 Mg Tablet, 10 MG PO DAILY, (Reported)


Cholecalciferol (Vitamin D3) 125 Mcg Tablet, 125 MCG PO DAILY, (Reported)


Folic Acid/Multivit-Minerals 200 Mcg Tab.chew, 200 MCG PO DAILY, (Reported)


Furosemide 40 Mg Tablet, 40 MG PO BID, (Reported)


Levothyroxine Sodium 125 Mcg Tablet, 125 MCG PO DAILY, (Reported)


Losartan Potassium 50 Mg Tablet, 50 MG PO 1200, (Reported)


Melatonin 5 Mg Tablet, 5 MG PO HS PRN for SLEEP, (Reported)


Potassium Bicarbonate/Cit AC 10 Meq Tablet.eff, 10 MEQ PO DAILY, (Reported)


   DISSOLVE IN 3-4 OUNCES 





Patient Home Medication List


Home Medication List Reviewed:  Yes





Physical Exam-Cardiology


Physical Exam


Vital Signs/I&O











 1/22/21 1/22/21 1/22/21 1/22/21





 06:00 07:00 07:00 08:00


 


Temp    35.2


 


Pulse 84 76 79 


 


Resp 16 16  


 


B/P (MAP) 129/81 (97) 121/63 (82)  


 


Pulse Ox 98 97  


 


O2 Delivery Nasal Cannula Nasal Cannula  


 


O2 Flow Rate 4.00 4.00  


 


    





 1/22/21 1/22/21 1/22/21 1/22/21





 08:00 09:00 10:00 10:50


 


Pulse 81 95 88 


 


Resp 11 16 15 


 


B/P (MAP) 124/75 (91) 125/69 (87) 120/70 (87) 


 


Pulse Ox 96 100 98 97


 


O2 Delivery Nasal Cannula Nasal Cannula Nasal Cannula Nasal Cannula


 


O2 Flow Rate 4.00 4.00 4.00 4.00





 1/22/21 1/22/21 1/22/21 1/22/21





 11:00 12:00 12:33 13:00


 


Pulse 85 75 90 79


 


Resp 24 27  16


 


B/P (MAP) 123/68 (86) 137/73 (94)  135/71 (92)


 


Pulse Ox 95 97  100


 


O2 Delivery Nasal Cannula Nasal Cannula  Nasal Cannula


 


O2 Flow Rate 4.00 4.00  4.00





 1/22/21 1/22/21 1/22/21 





 14:00 15:21 16:36 


 


Temp   36.2 


 


Pulse 75  77 


 


Resp 11  16 


 


B/P (MAP) 119/78 (92)  150/72 (98) 


 


Pulse Ox 99 98 94 


 


O2 Delivery Nasal Cannula Nasal Cannula Nasal Cannula 


 


O2 Flow Rate 4.00 4.00 4.00 





Capillary Refill : Less Than 3 Seconds


Constitutional:  AAO x 3, well-developed, well-nourished


HEENT:  PERRL, hard of hearing


Neck:  No carotid bruit; carotid pulses are 2 + bilaterally


Respiratory:  No accessory muscle use, No respiratory distress; chest expansion 

is symmetric, chest is bilaterally symmetric, other (diminished lower  lobes)


Cardiovascular:  irregularly irregular; No JVD; S1 and S2


Gastrointestinal:  No tender; soft, round, audible bowel sounds


Extremities:  no lower extremity edema bilateral


Neurologic/Psychiatric:  grossly intact (moves all extremities)


Skin:  No rash on exposed areas, No ulcerations on exposed areas





Data Review


Labs


Laboratory Tests


1/21/21 23:45: 


White Blood Count 9.1, Red Blood Count 3.71L, Hemoglobin 10.3L, Hematocrit 34L, 

Mean Corpuscular Volume 92, Mean Corpuscular Hemoglobin 28, Mean Corpuscular 

Hemoglobin Concent 30L, Red Cell Distribution Width 14.8H, Platelet Count 171, 

Mean Platelet Volume 10.0, Immature Granulocyte % (Auto) 1, Neutrophils (%) 

(Auto) 81H, Lymphocytes (%) (Auto) 10L, Monocytes (%) (Auto) 7, Eosinophils (%) 

(Auto) 1, Basophils (%) (Auto) 0, Neutrophils # (Auto) 7.4, Lymphocytes # (Auto)

0.9L, Monocytes # (Auto) 0.6, Eosinophils # (Auto) 0.1, Basophils # (Auto) 0.0, 

Immature Granulocyte # (Auto) 0.1, Erythrocyte Sedimentation Rate 29, 

Prothrombin Time 12.8, INR Comment 0.9, Activated Partial Thromboplast Time 20L,

D-Dimer 2.08H, Blood Gas Puncture Site RIGHT RADIAL, Blood Gas Patient 

Temperature 35.2, Arterial Blood pH 7.29*L, Arterial Blood Partial Pressure CO2 

54H, Arterial Blood Partial Pressure O2 81, Arterial Blood HCO3 26, Arterial 

Blood Total CO2 27.7, Arterial Blood Oxygen Saturation 90L, Arterial Blood Base 

Excess -0.2, Juan Jose Test YES-POS, Blood Gas Ventilator Setting NO, Blood Gas 

Inspired Oxygen 6L, Sodium Level 137, Potassium Level 4.1, Chloride Level 103, 

Carbon Dioxide Level 24, Anion Gap 10, Blood Urea Nitrogen 16, Creatinine 0.75, 

Estimat Glomerular Filtration Rate > 60, BUN/Creatinine Ratio 21, Glucose Level 

123H, Calcium Level 8.5, Corrected Calcium 8.9, Magnesium Level 2.1, Total 

Bilirubin 0.4, Aspartate Amino Transf (AST/SGOT) 79H, Alanine Aminotransferase 

(ALT/SGPT) 40, Alkaline Phosphatase 83, Lactate Dehydrogenase 616H, Total 

Creatine Kinase 67, Creatine Kinase MB 1.7, Myoglobin 59.1, Troponin I 0.039H, 

C-Reactive Protein High Sensitivity 0.14, B-Type Natriuretic Peptide 417.1H, 

Total Protein 7.5, Albumin 3.5, Amylase Level 71, Lipase 18, Procalcitonin 0.01,

Coronavirus (COVID-19)(PCR) Negative, Coronavirus 2019 (ZENY) Negative


1/22/21 00:08: 


Urine Color YELLOW, Urine Clarity CLEAR, Urine pH 5.5, Urine Specific Gravity 

>=1.030, Urine Protein 2+H, Urine Glucose (UA) NEGATIVE, Urine Ketones NEGATIVE,

Urine Nitrite NEGATIVE, Urine Bilirubin NEGATIVE, Urine Urobilinogen 1.0, Urine 

Leukocyte Esterase NEGATIVE, Urine RBC (Auto) NEGATIVE, Urine RBC NONE, Urine 

WBC NONE, Urine Squamous Epithelial Cells 0-2, Urine Crystals NONE, Urine 

Bacteria FEWH, Urine Casts PRESENT, Urine Hyaline Casts 2-5H, Urine Mucus SMALLH

, Urine Yeast FEWH, Urine Culture Indicated YES


1/22/21 05:45: 


White Blood Count 8.8, Red Blood Count 3.67L, Hemoglobin 10.1L, Hematocrit 33L, 

Mean Corpuscular Volume 91, Mean Corpuscular Hemoglobin 28, Mean Corpuscular 

Hemoglobin Concent 30L, Red Cell Distribution Width 14.7H, Platelet Count 286, 

Mean Platelet Volume 9.5, Immature Granulocyte % (Auto) 0, Neutrophils (%) 

(Auto) 70, Lymphocytes (%) (Auto) 23, Monocytes (%) (Auto) 7, Eosinophils (%) 

(Auto) 0, Basophils (%) (Auto) 0, Neutrophils # (Auto) 6.1, Lymphocytes # (Auto)

2.0, Monocytes # (Auto) 0.6, Eosinophils # (Auto) 0.0, Basophils # (Auto) 0.0, 

Immature Granulocyte # (Auto) 0.0, Sodium Level 141, Potassium Level 4.6, 

Chloride Level 103, Carbon Dioxide Level 28, Anion Gap 10, Blood Urea Nitrogen 

15, Creatinine 0.75, Estimat Glomerular Filtration Rate > 60, BUN/Creatinine 

Ratio 20, Glucose Level 102, Calcium Level 8.8, Corrected Calcium 9.3, Magnesium

Level 2.0, Total Bilirubin 0.6, Aspartate Amino Transf (AST/SGOT) 47H, Alanine 

Aminotransferase (ALT/SGPT) 31, Alkaline Phosphatase 81, Troponin I 0.290H, 

Total Protein 6.3L, Albumin 3.4, Phosphorus Level 4.3, Triglycerides Level 62, 

Cholesterol Level 185, LDL Cholesterol Direct 136H, VLDL Cholesterol 12, HDL 

Cholesterol 49





Microbiology


1/21/21 Influenza Types A,B Antigen (ARINA) - Final, Complete


          








A/P-Cardiology


Assessment/Admission Diagnosis





Hypoxia - likely multi-factorial r/t pleural effusion, acute on chronic 

diastolic CHF and acute exacerbation of COPD





Mildly elevated troponin, Type 2 MI secondary to hypoxia and tachycardia





Sinus tachycardia 





Acute on Chronic diastolic congestive heart failure





Echocardiogram in September 2020 reported by Dr. Steven as normal left 

ventricular size and function, EF 55-65 percent, grade 1 diastolic dysfunction, 

mildly dilated left atrium, PA pressure 40 mmHg.





H/O right pleural effusion in November and December 2020 for which she underwent

throracentesis in Nov 2020 by Dr. García





Acute on chronic exacerbation of COPD





Hypertension - uncontrolled





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





Discussion and Recomendations





Continue ASA


Uncontrolled hypertension with sinus tachycardia - add BB


Acute on chronic diastolic CHF - treat with diuretics


Ac exacerbation of COPD - management per medical/pulmonary services


Monitor lab closely 


Replace electrolytes as indicated


We would like to thank medical services for this consult











CAMILA JOHNSTON MD FACP FAC CCDS   Jan 22, 2021 17:15

## 2021-01-22 NOTE — PULMONARY CONSULTATION
History of Present Illness


History of Present Illness


Date Seen by Provider:  Jan 22, 2021


Time Seen by Provider:  03:47


Date of Admission








Allergies and Home Medications


Allergies


Coded Allergies:  


     Tetracyclines (Verified  Allergy, Unknown, 9/25/20)


Uncoded Allergies:  


     PCN (Allergy, Unknown, 9/25/20)





Home Medications


Albuterol Sulfate 1.25 Mg/3 Ml Vial.neb, 1.25 MG INH Q4H PRN for SHORTNESS OF 

BREATH, (Reported)


Ascorbic Acid/Ascorbate Sodium 250 Mg Tab.chew, 250 MG PO DAILY, (Reported)


Aspirin 81 Mg Tab.chew, 81 MG PO DAILY, (Reported)


Calcium Phosphate Trib/Vit D3 1 Each Tab.chew, 1 EACH PO DAILY, (Reported)


Cetirizine HCl 10 Mg Tablet, 10 MG PO DAILY, (Reported)


Cholecalciferol (Vitamin D3) 125 Mcg Tablet, 125 MCG PO DAILY, (Reported)


Folic Acid/Multivit-Minerals 200 Mcg Tab.chew, 200 MCG PO DAILY, (Reported)


Furosemide 40 Mg Tablet, 40 MG PO UD


   1 TAB MORNING AND 1/2 TAB 4PM 


   Prescribed by: ELIJAH SOUZA on 12/1/20 0920


Levothyroxine Sodium 125 Mcg Tablet, 125 MCG PO DAILY, (Reported)


Losartan Potassium 50 Mg Tablet, 50 MG PO 1200, (Reported)


Melatonin 5 Mg Tablet, 5 MG PO HS PRN for SLEEP, (Reported)


Potassium Bicarbonate/Cit AC 10 Meq Tablet.eff, 10 MEQ PO DAILY


   DISSOLVE 1 TABLET IN 3-4 OUNCES OF COLD WATER 


   Prescribed by: ELIJAH SOZUA on 12/1/20 0920





Past Medical-Social-Family Hx


Past Med/Social Hx:  Reviewed and Corrections made


Patient Social History


Alcohol Use:  Denies Use


Smoking Status:  Current Everyday Smoker


2nd Hand Smoke Exposure:  No


Recent Infectious Disease Expo:  No


Recent Hopitalizations:  No





Immunizations Up To Date


Tetanus Booster (TDap):  Unknown


Date of Pneumonia Vaccine:  Sep 2, 2019


Date of Influenza Vaccine:  Sep 28, 2020





Seasonal Allergies


Seasonal Allergies:  No





Past Medical History


Surgeries:  Yes (THORACENTESIS)


Neurological


Respiratory:  Yes (PLEURAL EFFUSIONS WITH THORACENTESIS)


COPD


Cardiac:  Yes (CHF)


Atrial Fibrillation, Chronic Edema/Swelling, Hypertension


Neurological:  No


Pregnant:  No


GYN History:  Menopausal


Genitourinary:  No


Gastrointestinal:  No


Musculoskeletal:  No


Endocrine:  Yes


Hypothyroidsim


HEENT:  Yes


Macular Degeneration


Cancer:  No


Psychosocial:  No


Integumentary:  No


Blood Disorders:  No





Family Medical History


Heart Disease, Cancer, Diabetes, Hypertension





Review of Systems


Time Seen by Provider:  03:53





Sepsis Event


Evaluation


Height, Weight, BMI


Height: '"


Weight: lbs. oz. kg; 31.00 BMI


Method:





Exam


Exam





Vital Signs








  Date Time  Temp Pulse Resp B/P (MAP) Pulse Ox O2 Delivery O2 Flow Rate FiO2


 


1/22/21 02:15 36.0 92 22 141/82 (153) 98 NIV Bilevel  


 


1/22/21 00:02  114 29  97  40.00 


 


1/21/21 23:45     98 NIV Bilevel  


 


1/21/21 23:30 35.2 115 28 192/134 (153) 96 Nasal Cannula 6.00 


 


1/21/21 23:30     96 Nasal Cannula 6.00 








Height & Weight


Height: '"


Weight: lbs. oz. kg; 31.00 BMI


Method:


Capillary Refill:  Less Than 3 Seconds


Gastrointestinal:  non tender, soft





Results


Lab


Laboratory Tests


1/21/21 23:45











Assessment/Plan


Assessment/Plan


Acute CHFAE with hypoxia 


   -Continue Lasix 


   -Pt is on home oxygen at 4 liters with Sp02 97%


   -Lasix given in ED 


   -No fever, No leukocytosis - Doubt PNA 


   -IVF SL 


   -PT is DNR/DNI 


Uncontrolled hypertension- Currently controlled 


NSTEMI 


   -Cardiology following 


   - Repeat Troponin pending 








Transfer to [facility] with tele if ok with Dr. Souza and Cardiology.











ADALID OVALLE DO              Jan 22, 2021 03:52

## 2021-01-22 NOTE — ED RESPIRATORY
General


Chief Complaint:  Respiratory Problems


Stated Complaint:  NAUSEA,COPD


Nursing Triage Note:  


brougth in by [facility] ems for nausea, increased soa.


Source:  patient (LIMITED HISTORIAN), old records (ONLY 2 PREVIOUS VISITS HERE 

--09/25/20 AND 11/28/20 FOR CHF / DYSPNEA / PLEURAL EFFUSIONS)





History of Present Illness


Date Seen by Provider:  Jan 21, 2021


Time Seen by Provider:  23:30


Initial Comments


PT ARRIVES VIA [facility] FROM HOME--PT LIVES ALONE. 


C/O NAUSEA, AND DRY HEAVES--EMS GAVE ZOFRAN AND THIS HAS RESOLVED, BEGAN 

APPROXIMATELY 1 1/2 HOURS AGO


C/O SEVERE SHORTNESS OF BREATH--PT HAS COPD AND NORMALLY WEARS HOME O2 AT 4L/NC.

PT IS UNABLE TO STATE WHEN HER SHORTNESS OF BREATH GOT WORSE


O2 SAT WAS 84% ON 4L/NC AT SCENE--EMS INCREASED TO 6L/NC AND O2 SATS UP TO 94%, 

BUT STILL WITH LABORED BREATHING


C/O CHEST TIGHTNESS


C/O LEG SWELLING --IS CHRONIC PROBLEM OFF AND ON, IS UNABLE TO STATE WHEN 

SWELLING BEGAN THIS TIME





PT DENIES FEVER


DENIES SIGNIFICANT COUGH


DENIES LOSS OF TASTE OR SMELL


DENIES HEADACHE


DENIES ANY KNOWN SICK CONTACTS OR KNOWN EXPOSURE TO COVID-19





IN ADDITION TO COPD, PT HAS CHF AND HTN


PT STATES SHE TOOK ALL OF HER MEDICATIONS TODAY, AND USED ALBUTEROL ONCE THIS 

MORNING AND IT HELPED, BUT HAS NOT USED IT AGAIN. 


HAS NOT TAKEN ANYTHING ELSE FOR SYMPTOMS 





PT VERBALIZED EMPHATICALLY THAT SHE DOES NOT WANT TO BE RESUSCITATED OR PLACED 

ON VENTILATOR.





PCP: DR. WOLFF





Allergies and Home Medications


Allergies


Coded Allergies:  


     Tetracyclines (Verified  Allergy, Unknown, 9/25/20)


Uncoded Allergies:  


     PCN (Allergy, Unknown, 9/25/20)





Home Medications


Albuterol Sulfate 1.25 Mg/3 Ml Vial.neb, 1.25 MG INH Q4H PRN for SHORTNESS OF 

BREATH, (Reported)


Ascorbic Acid/Ascorbate Sodium 250 Mg Tab.chew, 250 MG PO DAILY, (Reported)


Aspirin 81 Mg Tab.chew, 81 MG PO DAILY, (Reported)


Calcium Phosphate Trib/Vit D3 1 Each Tab.chew, 1 EACH PO DAILY, (Reported)


Cetirizine HCl 10 Mg Tablet, 10 MG PO DAILY, (Reported)


Cholecalciferol (Vitamin D3) 125 Mcg Tablet, 125 MCG PO DAILY, (Reported)


Folic Acid/Multivit-Minerals 200 Mcg Tab.chew, 200 MCG PO DAILY, (Reported)


Furosemide 40 Mg Tablet, 40 MG PO UD


   1 TAB MORNING AND 1/2 TAB 4PM 


   Prescribed by: ELIJAH WOLFF on 12/1/20 0920


Levothyroxine Sodium 125 Mcg Tablet, 125 MCG PO DAILY, (Reported)


Losartan Potassium 50 Mg Tablet, 50 MG PO 1200, (Reported)


Melatonin 5 Mg Tablet, 5 MG PO HS PRN for SLEEP, (Reported)


Potassium Bicarbonate/Cit AC 10 Meq Tablet.eff, 10 MEQ PO DAILY


   DISSOLVE 1 TABLET IN 3-4 OUNCES OF COLD WATER 


   Prescribed by: ELIJAH WOLFF on 12/1/20 0920





Patient Home Medication List


Home Medication List Reviewed:  Yes





Review of Systems


Review of Systems


Constitutional:  No chills, No diaphoresis, No fever


EENTM:  no symptoms reported


Respiratory:  see HPI, short of breath


Cardiovascular:  see HPI, chest pain, edema


Gastrointestinal:  see HPI; No abdominal pain, No diarrhea; nausea


Musculoskeletal:  no symptoms reported


Psychiatric/Neurological:  Anxiety





Past Medical-Social-Family Hx


Past Med/Social Hx:  Reviewed and Corrections made


Patient Social History


Alcohol Use:  Denies Use


Smoking Status:  Current Everyday Smoker


2nd Hand Smoke Exposure:  No


Recent Infectious Disease Expo:  No


Recent Hopitalizations:  No





Immunizations Up To Date


Tetanus Booster (TDap):  Unknown


Date of Pneumonia Vaccine:  Sep 2, 2019


Date of Influenza Vaccine:  Sep 28, 2020





Seasonal Allergies


Seasonal Allergies:  No





Past Medical History


Surgeries:  Yes (THORACENTESIS)


Neurological


Respiratory:  Yes (PLEURAL EFFUSIONS WITH THORACENTESIS)


COPD


Cardiac:  Yes (CHF)


Atrial Fibrillation, Chronic Edema/Swelling, Hypertension


Neurological:  No


Pregnant:  No


GYN History:  Menopausal


Genitourinary:  No


Gastrointestinal:  No


Musculoskeletal:  No


Endocrine:  Yes


Hypothyroidsim


HEENT:  Yes


Macular Degeneration


Cancer:  No


Psychosocial:  No


Integumentary:  No


Blood Disorders:  No





Family Medical History


Heart Disease, Cancer, Diabetes, Hypertension





Physical Exam





Vital Signs - First Documented








 1/21/21





 23:30


 


Temp 35.2


 


Pulse 115


 


Resp 28


 


B/P (MAP) 192/134 (153)


 


Pulse Ox 96


 


O2 Delivery Nasal Cannula


 


O2 Flow Rate 6.00





Capillary Refill : Less Than 3 Seconds


Height: '"


Weight: lbs. oz. kg; 31.00 BMI


Method:


General Appearance:  moderate distress (VERY DYSPNEIC, ABLE TO TALK IN 2-3 WORD 

PHRASES. )


Neck:  normal inspection


Respiratory:  respiratory distress, decreased breath sounds, accessory muscle 

use, rales, other (DECREASED AERATION IN ALL LUNG MANSFIELD, RALES IN BILATERAL 

BASES)


Cardiovascular:  tachycardia, irregularly irregular


Gastrointestinal:  non tender, soft


Extremities:  normal capillary refill, pedal edema (2+ BILATERALLY)


Neurologic/Psychiatric:  CNs II-XII nml as tested, no motor/sensory deficits, 

alert, oriented x 3, other (ANXIOUS)


Skin:  normal color, warm/dry





Progress/Results/Core Measures


Suspected Sepsis


Recent Fever Within 48 Hours:  No


Infection Criteria Present:  None


New/Unexplained  Altered Menta:  No


Sepsis Screen:  No Definite Risk


SIRS


Temperature: 


Pulse: 115 


Respiratory Rate: 28


 


Laboratory Tests


1/21/21 23:45: White Blood Count 9.1


Blood Pressure 192 /134 


Mean: 153


 


Laboratory Tests


1/21/21 23:45: 


Creatinine 0.75, INR Comment 0.9, Platelet Count 171, Total Bilirubin 0.4








Results/Orders


Lab Results





Laboratory Tests








Test


 1/21/21


23:45 1/22/21


00:08 Range/Units


 


 


White Blood Count


 9.1 


 


 4.3-11.0


10^3/uL


 


Red Blood Count


 3.71 L


 


 3.80-5.11


10^6/uL


 


Hemoglobin 10.3 L  11.5-16.0  g/dL


 


Hematocrit 34 L  35-52  %


 


Mean Corpuscular Volume 92   80-99  fL


 


Mean Corpuscular Hemoglobin 28   25-34  pg


 


Mean Corpuscular Hemoglobin


Concent 30 L


 


 32-36  g/dL





 


Red Cell Distribution Width 14.8 H  10.0-14.5  %


 


Platelet Count


 171 


 


 130-400


10^3/uL


 


Mean Platelet Volume 10.0   9.0-12.2  fL


 


Immature Granulocyte % (Auto) 1    %


 


Neutrophils (%) (Auto) 81 H  42-75  %


 


Lymphocytes (%) (Auto) 10 L  12-44  %


 


Monocytes (%) (Auto) 7   0-12  %


 


Eosinophils (%) (Auto) 1   0-10  %


 


Basophils (%) (Auto) 0   0-10  %


 


Neutrophils # (Auto)


 7.4 


 


 1.8-7.8


10^3/uL


 


Lymphocytes # (Auto)


 0.9 L


 


 1.0-4.0


10^3/uL


 


Monocytes # (Auto)


 0.6 


 


 0.0-1.0


10^3/uL


 


Eosinophils # (Auto)


 0.1 


 


 0.0-0.3


10^3/uL


 


Basophils # (Auto)


 0.0 


 


 0.0-0.1


10^3/uL


 


Immature Granulocyte # (Auto)


 0.1 


 


 0.0-0.1


10^3/uL


 


Erythrocyte Sedimentation Rate 29   0-30  MM/HR


 


Prothrombin Time 12.8   12.2-14.7  SEC


 


INR Comment 0.9   0.8-1.4  


 


Activated Partial


Thromboplast Time 20 L


 


 24-35  SEC





 


D-Dimer


 2.08 H


 


 0.00-0.49


UG/ML


 


Blood Gas Puncture Site RIGHT RADIAL    


 


Blood Gas Patient Temperature 35.2    


 


Arterial Blood pH 7.29 *L  7.37-7.43  


 


Arterial Blood Partial


Pressure CO2 54 H


 


 35-45  MMHG





 


Arterial Blood Partial


Pressure O2 81 


 


 79-93  MMHG





 


Arterial Blood HCO3 26   23-27  MMOL/L


 


Arterial Blood Total CO2


 27.7 


 


 21.0-31.0


MMOL/L


 


Arterial Blood Oxygen


Saturation 90 L


 


   %





 


Arterial Blood Base Excess


 -0.2 


 


 -2.5-2.5


MMOL/L


 


Juan Jose Test YES-POS    


 


Blood Gas Ventilator Setting NO    


 


Blood Gas Inspired Oxygen 6L    


 


Sodium Level 137   135-145  MMOL/L


 


Potassium Level 4.1   3.6-5.0  MMOL/L


 


Chloride Level 103     MMOL/L


 


Carbon Dioxide Level 24   21-32  MMOL/L


 


Anion Gap 10   5-14  MMOL/L


 


Blood Urea Nitrogen 16   7-18  MG/DL


 


Creatinine


 0.75 


 


 0.60-1.30


MG/DL


 


Estimat Glomerular Filtration


Rate > 60 


 


  





 


BUN/Creatinine Ratio 21    


 


Glucose Level 123 H    MG/DL


 


Calcium Level 8.5   8.5-10.1  MG/DL


 


Corrected Calcium 8.9   8.5-10.1  MG/DL


 


Magnesium Level 2.1   1.6-2.4  MG/DL


 


Total Bilirubin 0.4   0.1-1.0  MG/DL


 


Aspartate Amino Transf


(AST/SGOT) 79 H


 


 5-34  U/L





 


Alanine Aminotransferase


(ALT/SGPT) 40 


 


 0-55  U/L





 


Alkaline Phosphatase 83     U/L


 


Lactate Dehydrogenase 616 H  125-220  U/L


 


Total Creatine Kinase 67     U/L


 


Creatine Kinase MB 1.7   <6.6  NG/ML


 


Myoglobin


 59.1 


 


 10.0-92.0


NG/ML


 


Troponin I 0.039 H  <0.028  NG/ML


 


C-Reactive Protein High


Sensitivity 0.14 


 


 0.00-0.50


MG/DL


 


B-Type Natriuretic Peptide 417.1 H  <100.0  PG/ML


 


Total Protein 7.5   6.4-8.2  GM/DL


 


Albumin 3.5   3.2-4.5  GM/DL


 


Amylase Level 71     U/L


 


Lipase 18   8-78  U/L


 


Procalcitonin 0.01   <0.10  NG/ML


 


Coronavirus 2019 (ZENY) Negative   Negative  


 


Urine Color  YELLOW   


 


Urine Clarity  CLEAR   


 


Urine pH  5.5  5-9  


 


Urine Specific Gravity  >=1.030  1.016-1.022  


 


Urine Protein  2+ H NEGATIVE  


 


Urine Glucose (UA)  NEGATIVE  NEGATIVE  


 


Urine Ketones  NEGATIVE  NEGATIVE  


 


Urine Nitrite  NEGATIVE  NEGATIVE  


 


Urine Bilirubin  NEGATIVE  NEGATIVE  


 


Urine Urobilinogen  1.0  < = 1.0  MG/DL


 


Urine Leukocyte Esterase  NEGATIVE  NEGATIVE  


 


Urine RBC (Auto)  NEGATIVE  NEGATIVE  


 


Urine RBC  NONE   /HPF


 


Urine WBC  NONE   /HPF


 


Urine Squamous Epithelial


Cells 


 0-2 


  /HPF





 


Urine Crystals  NONE   /LPF


 


Urine Bacteria  FEW H  /HPF


 


Urine Casts  PRESENT   /LPF


 


Urine Hyaline Casts  2-5 H  /LPF


 


Urine Mucus  SMALL H  /LPF


 


Urine Yeast  FEW H  /HPF


 


Urine Culture Indicated  YES   








Micro Results





Microbiology


1/21/21 Influenza Types A,B Antigen (ARINA) - Final, Complete


          





My Orders





Orders - JAKOB RIOS DO


Ed Iv/Invasive Line Start (1/21/21 23:31)


Ekg Tracing (1/21/21 23:31)


O2 (1/21/21 23:31)


Monitor-Rhythm Ecg Trace Only (1/21/21 23:31)


Straight Cath For Spec.-Adult (1/21/21 23:31)


Cbc With Automated Diff (1/21/21 23:31)


Comprehensive Metabolic Panel (1/21/21 23:31)


Fibrin Degradation Products (1/21/21 23:31)


Procalcitonin (Pct) (1/21/21 23:31)


Hs C Reactive Protein (1/21/21 23:31)


Erythrocyte Sedimentation Rate (1/21/21 23:31)


LDH (1/21/21 23:31)


Blood Culture (1/21/21 23:31)


Influenza A And B Antigens (1/21/21 23:31)


Coronavirus Sars-Cov-2 So 2019 (1/21/21 23:31)


Covid 19 Inhouse Test (1/21/21 23:31)


Amylase (1/21/21 23:31)


BNP (1/21/21 23:31)


Creatine Kinase (1/21/21 23:31)


Creatine Kinase Mb (1/21/21 23:31)


Lipase (1/21/21 23:31)


Magnesium (1/21/21 23:31)


Protime With Inr (1/21/21 23:31)


Partial Thromboplastin Time (1/21/21 23:31)


Ua Culture If Indicated (1/21/21 23:31)


Myoglobin Serum (1/21/21 23:31)


Troponin I (1/21/21 23:31)


Aspirin Chewable Tablet (Baby Aspirin Ch (1/21/21 23:35)


Nitroglycerin Ointment (Nitrobid Ointme (1/21/21 23:35)


Arterial Blood Gas (1/21/21 23:53)


Lorazepam Injection (Ativan Injection) (1/21/21 23:49)


Chest 1 View, Ap/Pa Only (1/22/21 00:10)


Hydralazine Injection (Apresoline Inject (1/22/21 00:15)


Hydralazine Injection (Apresoline Inject (1/22/21 00:08)


Ekg Tracing (1/22/21 00:13)


Catheter(Urinary) Insert & Ass 03,15 (1/22/21 00:13)


Furosemide  Injection (Lasix  Injection) (1/22/21 00:30)


Aspirin Chewable Tablet (Baby Aspirin Ch (1/22/21 00:30)


Nitroglycerin Ointment (Nitrobid Ointme (1/22/21 00:30)


Lorazepam Injection (Ativan Injection) (1/22/21 00:30)


Furosemide  Injection (Lasix  Injection) (1/22/21 00:25)


Urine Culture (1/22/21 00:08)


Enoxaparin Injection (Lovenox Injection) (1/22/21 01:00)





Medications Given in ED





Current Medications








 Medications  Dose


 Ordered  Sig/Hussein


 Route  Start Time


 Stop Time Status Last Admin


Dose Admin


 


 Aspirin  324 mg  ONCE  ONCE


 PO  1/22/21 00:30


 1/22/21 00:43 DC 1/21/21 23:50


324 MG


 


 Furosemide  80 mg  ONCE  ONCE


 IVP  1/22/21 00:30


 1/22/21 00:31 DC 1/22/21 00:29


80 MG


 


 Hydralazine HCl  10 mg  ONCE  ONCE


 IV  1/22/21 00:15


 1/22/21 00:16 DC 1/22/21 00:23


10 MG


 


 Lorazepam  0.5 mg  ONCE  ONCE


 IVP  1/22/21 00:30


 1/22/21 00:43 DC 1/21/21 23:53


0.5 MG


 


 Nitroglycerin  1 inch  ONCE  ONCE


 TOP  1/22/21 00:30


 1/22/21 00:43 DC 1/21/21 23:40


1 INCH








Vital Signs/I&O











 1/21/21 1/21/21 1/21/21 1/22/21





 23:30 23:30 23:45 00:02


 


Temp  35.2  


 


Pulse  115  114


 


Resp  28  29


 


B/P (MAP)  192/134 (153)  


 


Pulse Ox 96 96 98 97


 


O2 Delivery Nasal Cannula Nasal Cannula NIV Bilevel 


 


O2 Flow Rate 6.00 6.00  40.00





Capillary Refill : Less Than 3 Seconds








Blood Pressure Mean:                    153








Progress Note :  


Progress Note


PLACED IN ISOLATION ROOM


PPE WORN AT ALL TIMES


COVID-19 TESTING PERFORMED





PLACED ON O2 AT 6L/NC AND O2 SATS IN UPPER 90'S BUT PT C/O INCREASED SHORTNESS 

OF BREATH AND WAS BECOMING MORE ANXIOUS AND MORE DYSPNEIC


PLACED ON BIPAP, WHICH SHE FOUGHT--GAVE SMALL DOSE OF ATIVAN WHICH HELPED RELAX 

PT, AND TOLERATED THE BIPAP BETTER


GIVEN ASPIRIN AND NITROPASTE FOR ELEVATED BLOOD PRESSURE AND FOR CHEST 

DISCOMFORT. 


GIVEN HYDRALAZINE FOR ELEVATED BLOOD PRESSURE


GIVEN LASIX FOR CHF AND PLEURAL EFFUSION





ALL SYMPTOMS IMPROVED AT TIME OF ADMIT. 


PT RESTING WITH NON-LABORED BREATHING ON BIPAP


NO COMPLAINTS OF CHEST PAIN FOR REMAINDER OF ER STAY


O2 SATS REMAIN IN HIGH 90'S


PULSE RATE DOWN TO 90'S


BP DOWN 'S SYSTOLIC. 





UNABLE TO OBTAIN IV ACCESS LARGE ENOUGH TO OBTAIN CT CHEST ANGIOGRAM AT THIS 

TIME, AS VEINS REPEATEDLY BLOW WITH LARGER IV'S. 


WILL TREAT WITH FULL DOSE LOVENOX AT THIS TIME, DUE TO ELEVATED D-DIMER AND 

ELEVATED TROPONIN





ECG


Initial ECG Impression Date:  Jan 21, 2021


Initial ECG Impression Time:  23:37


Initial ECG Rate:  119


Initial ECG Rhythm:  S.Tach (WITH MULTIFOCAL PAC'S, DIFFUSE ST DEPRESSION )


EKG :  


   EKG Time:  00:04


   Rate:  106


   Rhythm:  S.Tach


   ECG Impression:  Nonspecific Changes





Diagnostic Imaging





Comments


CXR--CHF, RIGHT PLEURAL EFFUSION, PENDING RADIOLOGIST REVIEW


   Reviewed:  Reviewed by Me





Departure


Communication (Admissions)


0050--SPOKE WITH DR. WOLFF, ACCEPTS PT FOR ADMIT. WILL CONSULT CARDIOLOGY IN 

AM.





Impression





   Primary Impression:  


   Acute on chronic respiratory failure


   Additional Impressions:  


   CHF (congestive heart failure)


   COPD (chronic obstructive pulmonary disease)


   Recurrent right pleural effusion


   Uncontrolled hypertension


   Person under investigation for COVID-19


   Elevated troponin


Disposition:  09 ADMITTED AS INPATIENT (ERASED)


Condition:  Improved





Admissions


Decision to Admit Reason:  Admit from ER (General)


Decision to Admit/Date:  Jan 22, 2021


Time/Decision to Admit Time:  00:50





Departure-Patient Inst.


Referrals:  


ELIJAH WOLFF MD (PCP)


Primary Care Physician








ELLIE RAMOS (Family)


Primary Care Physician











JAKOB RIOS DO                 Jan 22, 2021 00:06

## 2021-01-22 NOTE — HISTORY & PHYSICAL
History of Present Illness


History of Present Illness


Reason for visit/HPI


PT IS AN 87 Y/O FEMALE WHO IS KNOWN TO ME FROM CLINIC AS A NEW PATIENT IN MY 

PRACTICE.


SHE STARTED TO HAVE A LOT OF SHORTNESS OF BREATH, AND FELT LIKE HER HEART WAS 

POUNDING, SHE WAS ALSO ACUTELY NAUSEATED.  SHE REPORTS THAT HER OXYGEN LEVEL WAS

LOW DESPITE BEING ON 4L O2 AT HOME.


SHE WAS ADMITTED FOR CHF WITH ELEVATED TROPONIN


Date of Admission


Jan 22, 2021 at 00:50


Date Seen by a Provider:  Jan 22, 2021


Time Seen by a Provider:  07:10


I consulted on this patient on


1/22/21


 07:45


Attending Physician


Elijah Souza MD


Admitting Physician


Elijah Souza MD


Consult


CARDIOLOGY





Allergies and Home Medications


Allergies


Coded Allergies:  


     Tetracyclines (Verified  Allergy, Unknown, 9/25/20)


Uncoded Allergies:  


     PCN (Allergy, Unknown, 9/25/20)





Home Medications


Albuterol Sulfate 1.25 Mg/3 Ml Vial.neb, 1.25 MG INH Q4H PRN for SHORTNESS OF 

BREATH, (Reported)


Ascorbic Acid/Ascorbate Sodium 250 Mg Tab.chew, 250 MG PO DAILY, (Reported)


Aspirin 81 Mg Tab.chew, 81 MG PO DAILY, (Reported)


Calcium Phosphate Trib/Vit D3 1 Each Tab.chew, 1 EACH PO DAILY, (Reported)


Cetirizine HCl 10 Mg Tablet, 10 MG PO DAILY, (Reported)


Cholecalciferol (Vitamin D3) 125 Mcg Tablet, 125 MCG PO DAILY, (Reported)


Folic Acid/Multivit-Minerals 200 Mcg Tab.chew, 200 MCG PO DAILY, (Reported)


Furosemide 40 Mg Tablet, 40 MG PO BID, (Reported)


Levothyroxine Sodium 125 Mcg Tablet, 125 MCG PO DAILY, (Reported)


Losartan Potassium 50 Mg Tablet, 50 MG PO 1200, (Reported)


Melatonin 5 Mg Tablet, 5 MG PO HS PRN for SLEEP, (Reported)


Potassium Bicarbonate/Cit AC 10 Meq Tablet.eff, 10 MEQ PO DAILY, (Reported)


   DISSOLVE IN 3-4 OUNCES 





Patient Home Medication List


Home Medication List Reviewed:  Yes





Past Medical-Social-Family Hx


Past Med/Social Hx:  Reviewed and Corrections made


Patient Social History


Marrital Status:  


Living Status:  LIVES AT HOME ALONE


Employed/Student:  retired


Alcohol Use:  Denies Use


Recreational Drug Use:  No


Smoking Status:  Current Everyday Smoker


2nd Hand Smoke Exposure:  No


Physical Abuse Screen:  No


Sexual Abuse:  No


Recent Foreign Travel:  No


Contact w/other who traveled:  No


Recent Hopitalizations:  No


Recent Infectious Disease Expo:  No





Immunizations Up To Date


Tetanus Booster (TDap):  Unknown


Date of Pneumonia Vaccine:  Sep 2, 2019


Date of Influenza Vaccine:  Sep 28, 2021





Seasonal Allergies


Seasonal Allergies:  No





Past Medical History


Surgeries:  Neurological


Respiratory:  COPD


Cardiac:  Atrial Fibrillation, Chronic Edema/Swelling, Hypertension


Pregnant:  No


Reproductive:  No


Sexually Transmitted Disease:  No


HIV/AIDS:  No


Menopausal


Musculoskeletal:  Arthritis


Endocrine:  Hypothyroidsim


HEENT:  Macular Degeneration


Loss of Vision:  Denies


Hearing Impairment:  Hard of Hearing


History of Blood Disorders:  No





Family History


Reviewed Nursing Family Hx


Heart Disease, Cancer, Diabetes, Hypertension





Review of Systems


Constitutional:  No chills, No fever; malaise, weakness


EENTM:  hearing loss; No hoarseness, No throat pain


Respiratory:  No cough; dyspnea on exertion, short of breath


Cardiovascular:  No chest pain; palpitations


Gastrointestinal:  No constipation, No diarrhea, No loss of appetite, No melena;

nausea; No vomiting


Genitourinary:  no symptoms reported


Musculoskeletal:  muscle weakness


Skin:  no symptoms reported


Psychiatric/Neurological:  Denies Anxiety, Denies Depressed; Weakness





All Other Systems Reviewed


Negative Unless Noted:  Yes





Physical Exam


Vital Signs





Vital Signs - First Documented








 1/21/21 1/22/21





 23:30 03:31


 


Temp 35.2 


 


Pulse 115 


 


Resp 28 


 


B/P (MAP) 192/134 (153) 


 


Pulse Ox 96 


 


O2 Delivery Nasal Cannula 


 


O2 Flow Rate 6.00 


 


FiO2  28





Capillary Refill : Less Than 3 Seconds


Height, Weight, BMI


Height: '"


Weight: lbs. oz. kg; 33.59 BMI


Method:


General Appearance:  No Apparent Distress, WD/WN


Eyes:  Bilateral Eye Normal Inspection, Bilateral Eye PERRL, Bilateral Eye EOMI


HEENT:  PERRL/EOMI, Pharynx Normal


Neck:  Full Range of Motion, Non Tender, Supple


Respiratory:  Chest Non Tender, Lungs Clear, Normal Breath Sounds, No Accessory 

Muscle Use, No Respiratory Distress


Cardiovascular:  Irregularly Irregular, Tachycardia


Gastrointestinal:  Normal Bowel Sounds, No Organomegaly, No Pulsatile Mass, Non 

Tender, Soft


Rectal:  Deferred


Back:  Normal Inspection, No Vertebral Tenderness


Extremity:  Normal Capillary Refill, Normal Inspection, Normal Range of Motion, 

Non Tender, No Calf Tenderness, Pedal Edema


Neurologic/Psychiatric:  Alert, Oriented x3, No Motor/Sensory Deficits, Normal 

Mood/Affect, CNs II-XII Norm as Tested


Skin:  Warm/Dry


Lymphatic:  No Adenopathy





Assessment/Plan


Assessment and Plan


SINUS TACHYCARDIA


HYPERTENSION


HYPOXIA


COPD


HYPOTHYROIDISM


NSTEMI


PULMONARY EDEMA








SINUS TACHYCARDIA WITH CHF


   - DEFER TO CARDIOLOGY - 


   - PT ON LOVENOX AT THIS TIME


   - LOW DOSE OF LASIX





HYPERTENSION


    - RESUME HOME REGIMEN, ADDED BETABLOCKER THERAPY





HYPOXIA WITH COPD


   - MONITOR SYMPTOMS ON OXYGEN THERAPY AFTER DOSING OF LASIX.





HYPOTHYROIDISM


    - RESUME HOME REGIMEN





NSTEMI


   - DEFER TO CARDIOLOGY -


   - ELEVATED TROPONIN DUE TO HER SEVERE HYPOXIC EVENT





PULMONARY EDEMA


   - SHOULD IMPROVE WITH LASIX





DVT PROPHYLAXIS WITH LOVENOX


GI PROPHYLAXIS WITH PPI





Admission Diagnosis


SINUS TACHYCARDIA


HYPERTENSION


HYPOXIA


COPD


HYPOTHYROIDISM


NSTEMI


PULMONARY EDEMA


Admission Status:  Inpatient Order (span 2 midnights)


Reason for Inpatient Admission:  


INPT ADMISSION FOR CHF, HYPOXIA AND NSTEMI - ANTICIPATE ADMISSION FOR AT


LEAST 72 HOURS FOR MEDICATION ADJUSTMENT











ELIJAH SOUZA MD            Jan 22, 2021 07:45

## 2021-01-22 NOTE — DIAGNOSTIC IMAGING REPORT
EXAMINATION: Chest radiograph, portable AP view.



DATE: 1/22/2021 12:28 AM



INDICATION: 86-year-old female, dyspnea, hypoxia.



COMPARISON:  December 1, 2020.



FINDINGS: Stable overall appearance of the cardiomediastinal

silhouette. There is no identified pneumothorax. There is

nonspecific right basilar airspace consolidation. There is also

nonspecific left basilar airspace consolidation. There are

bilateral interstitial opacities. There are linear opacities in

the right midlung.



IMPRESSION: 

1. Largely unchanged nonspecific bibasilar airspace consolidation

with probable right pleural effusion.

2. Bilateral interstitial opacities which may reflect

interstitial edema or atypical infectious etiology.





Dictated by: 



  Dictated on workstation # [id number]

## 2021-01-22 NOTE — NUR
INITIATE O2 TKS > 90%/BIPAP ON STANDBY PER DR WOLFF. START ALBUTEROL MDI 2 PUFFS Q4 AND 
Q2 PRN FOR SOA. START AEROBIKA QID AND PRN. IS QID AND PRN. RT TO REASSESS IN 72H OR SOONER 
IF INDICATED.




-------------------------------------------------------------------------------

Addendum: 01/22/21 at 0359 by JERRY TOLENTINO RT

-------------------------------------------------------------------------------

Amended: Links added.

## 2021-01-23 LAB
BASOPHILS # BLD AUTO: 0 10^3/UL (ref 0–0.1)
BASOPHILS NFR BLD AUTO: 0 % (ref 0–10)
BUN/CREAT SERPL: 19
CALCIUM SERPL-MCNC: 9.1 MG/DL (ref 8.5–10.1)
CHLORIDE SERPL-SCNC: 100 MMOL/L (ref 98–107)
CO2 SERPL-SCNC: 29 MMOL/L (ref 21–32)
CREAT SERPL-MCNC: 0.79 MG/DL (ref 0.6–1.3)
EOSINOPHIL # BLD AUTO: 0.2 10^3/UL (ref 0–0.3)
EOSINOPHIL NFR BLD AUTO: 3 % (ref 0–10)
GFR SERPLBLD BASED ON 1.73 SQ M-ARVRAT: > 60 ML/MIN
GLUCOSE SERPL-MCNC: 85 MG/DL (ref 70–105)
HCT VFR BLD CALC: 32 % (ref 35–52)
HGB BLD-MCNC: 9.8 G/DL (ref 11.5–16)
LYMPHOCYTES # BLD AUTO: 2.5 10^3/UL (ref 1–4)
LYMPHOCYTES NFR BLD AUTO: 37 % (ref 12–44)
MAGNESIUM SERPL-MCNC: 2 MG/DL (ref 1.6–2.4)
MANUAL DIFFERENTIAL PERFORMED BLD QL: NO
MCH RBC QN AUTO: 28 PG (ref 25–34)
MCHC RBC AUTO-ENTMCNC: 31 G/DL (ref 32–36)
MCV RBC AUTO: 90 FL (ref 80–99)
MONOCYTES # BLD AUTO: 0.6 10^3/UL (ref 0–1)
MONOCYTES NFR BLD AUTO: 9 % (ref 0–12)
NEUTROPHILS # BLD AUTO: 3.6 10^3/UL (ref 1.8–7.8)
NEUTROPHILS NFR BLD AUTO: 51 % (ref 42–75)
PHOSPHATE SERPL-MCNC: 4.5 MG/DL (ref 2.3–4.7)
PLATELET # BLD: 272 10^3/UL (ref 130–400)
PMV BLD AUTO: 10.2 FL (ref 9–12.2)
POTASSIUM SERPL-SCNC: 3.8 MMOL/L (ref 3.6–5)
SODIUM SERPL-SCNC: 140 MMOL/L (ref 135–145)
WBC # BLD AUTO: 6.9 10^3/UL (ref 4.3–11)

## 2021-01-23 RX ADMIN — ALBUTEROL SULFATE SCH PUFF: 90 AEROSOL, METERED RESPIRATORY (INHALATION) at 14:50

## 2021-01-23 RX ADMIN — ASPIRIN 81 MG CHEWABLE TABLET SCH MG: 81 TABLET CHEWABLE at 08:18

## 2021-01-23 RX ADMIN — SODIUM CHLORIDE SCH MLS/HR: 900 INJECTION INTRAVENOUS at 09:02

## 2021-01-23 RX ADMIN — Medication SCH ML: at 05:57

## 2021-01-23 RX ADMIN — FAMOTIDINE SCH MG: 20 TABLET, FILM COATED ORAL at 19:34

## 2021-01-23 RX ADMIN — ALBUTEROL SULFATE SCH PUFF: 90 AEROSOL, METERED RESPIRATORY (INHALATION) at 10:59

## 2021-01-23 RX ADMIN — ENOXAPARIN SODIUM SCH MG: 100 INJECTION SUBCUTANEOUS at 02:30

## 2021-01-23 RX ADMIN — Medication SCH ML: at 19:34

## 2021-01-23 RX ADMIN — FAMOTIDINE SCH MG: 20 TABLET, FILM COATED ORAL at 08:18

## 2021-01-23 RX ADMIN — LEVOTHYROXINE SODIUM SCH MCG: 125 TABLET ORAL at 05:57

## 2021-01-23 RX ADMIN — ALBUTEROL SULFATE SCH PUFF: 90 AEROSOL, METERED RESPIRATORY (INHALATION) at 08:00

## 2021-01-23 RX ADMIN — Medication SCH ML: at 13:06

## 2021-01-23 RX ADMIN — ALBUTEROL SULFATE SCH PUFF: 90 AEROSOL, METERED RESPIRATORY (INHALATION) at 21:56

## 2021-01-23 NOTE — PROGRESS NOTE - CARDIOLOGY
Cardiology SOAP Progress Note


Subjective:


Gen malaise and weakness


No shortness of breath at rest


No cp or palp or syncope


No n/v/d





Objective:


I&O/Vital Signs











 1/23/21 1/23/21 1/23/21 1/23/21





 04:00 05:35 07:00 08:00


 


Temp 36.5 36.0  


 


Pulse 71 62 108 


 


Resp 17 19  


 


B/P (MAP) 160/77 (104) 160/77 (104)  


 


Pulse Ox 96 95  90


 


O2 Delivery Nasal Cannula Nasal Cannula  Nasal Cannula


 


O2 Flow Rate 4.00 4.00  2.00


 


    





 1/23/21 1/23/21 1/23/21 1/23/21





 08:00 08:00 10:59 11:50


 


Temp 36.2   36.1


 


Pulse 84   66


 


Resp 16   16


 


B/P (MAP) 129/63 (85)   144/67 (92)


 


Pulse Ox 97  97 99


 


O2 Delivery Nasal Cannula Nasal Cannula Nasal Cannula Nasal Cannula


 


O2 Flow Rate 2.00 2.00 2.00 2.00


 


    





 1/23/21   





 13:00   


 


Pulse 68   














 1/23/21





 00:00


 


Intake Total 1280 ml


 


Output Total 775 ml


 


Balance 505 ml








Constitutional:  AAO x 3, well-developed, well-nourished


Respiratory:  No accessory muscle use, No respiratory distress; chest expansion 

is symmetric, chest is bilaterally symmetric, other (diminished lower  lobes)


Cardiovascular:  irregularly irregular; No JVD; S1 and S2


Gastrointestional:  No tender; soft, round, audible bowel sounds


Extremities:  no lower extremity edema bilateral


Neurologic/Psychiatric:  grossly intact (moves all extremities)


Skin:  No rash on exposed areas, No ulcerations on exposed areas





Results/Procedures:


Labs


Laboratory Tests


1/23/21 05:28: 


White Blood Count 6.9, Red Blood Count 3.56L, Hemoglobin 9.8L, Hematocrit 32L, 

Mean Corpuscular Volume 90, Mean Corpuscular Hemoglobin 28, Mean Corpuscular 

Hemoglobin Concent 31L, Red Cell Distribution Width 15.0H, Platelet Count 272, 

Mean Platelet Volume 10.2, Immature Granulocyte % (Auto) 0, Neutrophils (%) 

(Auto) 51, Lymphocytes (%) (Auto) 37, Monocytes (%) (Auto) 9, Eosinophils (%) 

(Auto) 3, Basophils (%) (Auto) 0, Neutrophils # (Auto) 3.6, Lymphocytes # (Auto)

2.5, Monocytes # (Auto) 0.6, Eosinophils # (Auto) 0.2, Basophils # (Auto) 0.0, 

Immature Granulocyte # (Auto) 0.0, Sodium Level 140, Potassium Level 3.8, 

Chloride Level 100, Carbon Dioxide Level 29, Anion Gap 11, Blood Urea Nitrogen 

15, Creatinine 0.79, Estimat Glomerular Filtration Rate > 60, BUN/Creatinine 

Ratio 19, Glucose Level 85, Calcium Level 9.1, Phosphorus Level 4.5, Magnesium 

Level 2.0





Microbiology


1/22/21 MRSA Screen - Final, Complete


          MRSA not isolated


1/22/21 Urine Culture - Preliminary, Resulted


          Klebsiella pneumoniae


1/22/21 Blood Culture - Preliminary, Resulted


          No growth





Laboratory Tests


1/21/21 23:45








1/22/21 05:45








1/23/21 05:28











A/P:


Assessment:





Hypoxia - likely multi-factorial r/t pleural effusion, acute on chronic 

diastolic CHF and acute exacerbation of COPD





Mildly elevated troponin, Type 2 MI secondary to hypoxia and tachycardia





Acute on chronic diastolic congestive heart failure





Echocardiogram in September 2020 reported by Dr. Steven as normal left 

ventricular size and function, EF 55-65 percent, grade 1 diastolic dysfunction, 

mildly dilated left atrium, PA pressure 40 mmHg.





H/O right pleural effusion in November and December 2020 for which she underwent

throracentesis in Nov 2020 by Dr. García





Acute on chronic exacerbation of COPD





Hypertension





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


Plan:





BP and heart rate are better on bb


Continue treatment


Monitor lab closely 


Replace electrolytes as indicated











CAMILA JOHNSTON MD FACP FAC CCDS   Jan 23, 2021 13:55

## 2021-01-23 NOTE — DIAGNOSTIC IMAGING REPORT
INDICATION: Respiratory failure



EXAMINATION: Chest 01/23/2021



COMPARISON: 01/22/2021



FINDINGS:



The heart is enlarged. The pulmonary vasculature is congested.

There are findings of edema throughout both lungs with bibasilar

infiltrates and effusions. No pneumothorax.



IMPRESSION:

1. Pulmonary edema

2. Bibasilar atelectasis or infiltrate with adjacent effusions.



Dictated by: 



  Dictated on workstation # [id number]

## 2021-01-23 NOTE — PROGRESS NOTE
Subjective


Subjective


Date Seen by Provider:  Jan 23, 2021


Time Seen by Provider:  08:38


PT ADMITTED TO THE HOSPITAL FOR CHF/SINUS TACHYCARDIA, NSTEMI


SHE IS IMPROVED, FLUID OUTPUT HAS BEEN ROBUST AND SHE IS FEELING BETTER TODAY.


SHE COMPLAINS OF FEELING WEAK.





Review of Systems


General:  Fatigue; No Malaise


HEENT:  No Head Aches


Pulmonary:  No Dyspnea, No Cough


Cardiovascular:  Edema; No: Chest Pain, Palpitations


Gastrointestinal:  No: Nausea, Abdominal Pain


Genitourinary:  No Dysuria; Other (KAHN IN PLACE)


Neurological:  Weakness; No: Confusion





All Other Systems Reviewed


All Other Systems Reviewed:  Yes





Objective


Exam


Vital Signs





Vital Signs - First Documented








 1/21/21 1/22/21





 23:30 03:31


 


Temp 35.2 


 


Pulse 115 


 


Resp 28 


 


B/P (MAP) 192/134 (153) 


 


Pulse Ox 96 


 


O2 Delivery Nasal Cannula 


 


O2 Flow Rate 6.00 


 


FiO2  28





Capillary Refill : Less Than 3 Seconds


General Appearance:  No Apparent Distress, WD/WN


Eyes:  Bilateral Eye Normal Inspection, Bilateral Eye PERRL, Bilateral Eye EOMI


HEENT:  PERRL/EOMI, Pharynx Normal


Neck:  Full Range of Motion, Non Tender, Supple


Respiratory:  Chest Non Tender, No Respiratory Distress, Crackles (RIGHT BASE)


Cardiovascular:  Irregularly Irregular, Tachycardia


Gastrointestinal:  Normal Bowel Sounds, No Organomegaly, No Pulsatile Mass, Non 

Tender, Soft


Rectal:  Deferred


Back:  Normal Inspection, No Vertebral Tenderness


Extremity:  Normal Capillary Refill, Normal Inspection, Normal Range of Motion, 

Non Tender, No Calf Tenderness, Pedal Edema


Neurologic/Psychiatric:  Alert, Oriented x3, No Motor/Sensory Deficits, Normal 

Mood/Affect, CNs II-XII Norm as Tested


Skin:  Warm/Dry


Lymphatic:  No Adenopathy





Results


Lab


Laboratory Tests


1/23/21 05:28: 


White Blood Count 6.9, Red Blood Count 3.56L, Hemoglobin 9.8L, Hematocrit 32L, 

Mean Corpuscular Volume 90, Mean Corpuscular Hemoglobin 28, Mean Corpuscular 

Hemoglobin Concent 31L, Red Cell Distribution Width 15.0H, Platelet Count 272, 

Mean Platelet Volume 10.2, Immature Granulocyte % (Auto) 0, Neutrophils (%) 

(Auto) 51, Lymphocytes (%) (Auto) 37, Monocytes (%) (Auto) 9, Eosinophils (%) 

(Auto) 3, Basophils (%) (Auto) 0, Neutrophils # (Auto) 3.6, Lymphocytes # (Auto)

2.5, Monocytes # (Auto) 0.6, Eosinophils # (Auto) 0.2, Basophils # (Auto) 0.0, 

Immature Granulocyte # (Auto) 0.0, Sodium Level 140, Potassium Level 3.8, 

Chloride Level 100, Carbon Dioxide Level 29, Anion Gap 11, Blood Urea Nitrogen 

15, Creatinine 0.79, Estimat Glomerular Filtration Rate > 60, BUN/Creatinine 

Ratio 19, Glucose Level 85, Calcium Level 9.1, Phosphorus Level 4.5, Magnesium 

Level 2.0





Microbiology


1/22/21 MRSA Screen - Final, Complete


          MRSA not isolated


1/22/21 Urine Culture - Preliminary, Resulted


          Klebsiella pneumoniae


1/22/21 Blood Culture - Preliminary, Resulted


          No growth





Assessment/Plan


Assessment/Plan


Admission Dx


SINUS TACHYCARDIA


HYPERTENSION


HYPOXIA


COPD


HYPOTHYROIDISM


NSTEMI


PULMONARY EDEMA


PNEUMONIA





SINUS TACHYCARDIA WITH CHF


   - DEFER TO CARDIOLOGY - 


   - PT ON LOVENOX AT THIS TIME


   - LOW DOSE OF LASIX





HYPERTENSION


    - RESUME HOME REGIMEN, ADDED BETABLOCKER THERAPY





HYPOXIA WITH COPD


   - MONITOR SYMPTOMS ON OXYGEN THERAPY AFTER DOSING OF LASIX.





HYPOTHYROIDISM


    - RESUME HOME REGIMEN





NSTEMI


   - DEFER TO CARDIOLOGY -


   - ELEVATED TROPONIN DUE TO HER SEVERE HYPOXIC EVENT





PULMONARY EDEMA


   - SHOULD IMPROVE WITH LASIX





COMMUNITY ACQUIRED PNEUMONIA


   - START ON ROCEPHIN/AZITHROMYCIN THERAPY





DVT PROPHYLAXIS WITH LOVENOX


GI PROPHYLAXIS WITH PPI


Admission Dx


ATRIAL FIBRILLATION


HYPERTENSION


HYPOXIA


COPD


HYPOTHYROIDISM


NSTEMI


PULMONARY EDEMA








ATRIAL FIBRILLATION


   - DEFER TO CARDIOLOGY - 


   - PT ON LOVENOX AT THIS TIME





HYPERTENSION


HYPOXIA


COPD


HYPOTHYROIDISM


NSTEMI


PULMONARY EDEMA





Clinical Quality Measures


Admission Status


Admission Dx


ATRIAL FIBRILLATION


HYPERTENSION


HYPOXIA


COPD


HYPOTHYROIDISM


NSTEMI


PULMONARY EDEMA








ATRIAL FIBRILLATION


   - DEFER TO CARDIOLOGY - 


   - PT ON LOVENOX AT THIS TIME





HYPERTENSION


HYPOXIA


COPD


HYPOTHYROIDISM


NSTEMI


PULMONARY EDEMA











ELIJAH WOLFF MD            Jan 23, 2021 08:33

## 2021-01-24 LAB
BASOPHILS # BLD AUTO: 0 10^3/UL (ref 0–0.1)
BASOPHILS NFR BLD AUTO: 0 % (ref 0–10)
BUN/CREAT SERPL: 28
CALCIUM SERPL-MCNC: 8.6 MG/DL (ref 8.5–10.1)
CHLORIDE SERPL-SCNC: 103 MMOL/L (ref 98–107)
CO2 SERPL-SCNC: 27 MMOL/L (ref 21–32)
CREAT SERPL-MCNC: 0.88 MG/DL (ref 0.6–1.3)
EOSINOPHIL # BLD AUTO: 0.3 10^3/UL (ref 0–0.3)
EOSINOPHIL NFR BLD AUTO: 4 % (ref 0–10)
GFR SERPLBLD BASED ON 1.73 SQ M-ARVRAT: > 60 ML/MIN
GLUCOSE SERPL-MCNC: 95 MG/DL (ref 70–105)
HCT VFR BLD CALC: 31 % (ref 35–52)
HGB BLD-MCNC: 9.6 G/DL (ref 11.5–16)
LYMPHOCYTES # BLD AUTO: 1.5 10^3/UL (ref 1–4)
LYMPHOCYTES NFR BLD AUTO: 19 % (ref 12–44)
MAGNESIUM SERPL-MCNC: 2.1 MG/DL (ref 1.6–2.4)
MANUAL DIFFERENTIAL PERFORMED BLD QL: NO
MCH RBC QN AUTO: 28 PG (ref 25–34)
MCHC RBC AUTO-ENTMCNC: 31 G/DL (ref 32–36)
MCV RBC AUTO: 89 FL (ref 80–99)
MONOCYTES # BLD AUTO: 0.7 10^3/UL (ref 0–1)
MONOCYTES NFR BLD AUTO: 10 % (ref 0–12)
NEUTROPHILS # BLD AUTO: 5.1 10^3/UL (ref 1.8–7.8)
NEUTROPHILS NFR BLD AUTO: 67 % (ref 42–75)
PHOSPHATE SERPL-MCNC: 4.2 MG/DL (ref 2.3–4.7)
PLATELET # BLD: 267 10^3/UL (ref 130–400)
PMV BLD AUTO: 9.9 FL (ref 9–12.2)
POTASSIUM SERPL-SCNC: 4.2 MMOL/L (ref 3.6–5)
SODIUM SERPL-SCNC: 138 MMOL/L (ref 135–145)
WBC # BLD AUTO: 7.6 10^3/UL (ref 4.3–11)

## 2021-01-24 RX ADMIN — ALBUTEROL SULFATE SCH PUFF: 90 AEROSOL, METERED RESPIRATORY (INHALATION) at 15:00

## 2021-01-24 RX ADMIN — SODIUM CHLORIDE SCH MLS/HR: 900 INJECTION INTRAVENOUS at 08:14

## 2021-01-24 RX ADMIN — FAMOTIDINE SCH MG: 20 TABLET, FILM COATED ORAL at 08:14

## 2021-01-24 RX ADMIN — Medication SCH ML: at 13:44

## 2021-01-24 RX ADMIN — ALBUTEROL SULFATE SCH PUFF: 90 AEROSOL, METERED RESPIRATORY (INHALATION) at 07:28

## 2021-01-24 RX ADMIN — FAMOTIDINE SCH MG: 20 TABLET, FILM COATED ORAL at 19:27

## 2021-01-24 RX ADMIN — ENOXAPARIN SODIUM SCH MG: 100 INJECTION SUBCUTANEOUS at 08:14

## 2021-01-24 RX ADMIN — ASPIRIN 81 MG CHEWABLE TABLET SCH MG: 81 TABLET CHEWABLE at 08:14

## 2021-01-24 RX ADMIN — AZITHROMYCIN SCH MG: 250 TABLET, FILM COATED ORAL at 08:14

## 2021-01-24 RX ADMIN — LEVOTHYROXINE SODIUM SCH MCG: 125 TABLET ORAL at 06:38

## 2021-01-24 RX ADMIN — ALBUTEROL SULFATE SCH PUFF: 90 AEROSOL, METERED RESPIRATORY (INHALATION) at 10:52

## 2021-01-24 RX ADMIN — Medication SCH ML: at 06:38

## 2021-01-24 RX ADMIN — ALBUTEROL SULFATE SCH PUFF: 90 AEROSOL, METERED RESPIRATORY (INHALATION) at 18:37

## 2021-01-24 RX ADMIN — Medication SCH ML: at 19:27

## 2021-01-24 NOTE — PROGRESS NOTE
Subjective


Subjective


Time Seen by Provider:  09:25


PT ADMITTED TO THE HOSPITAL FOR CHF/SINUS TACHYCARDIA, NSTEMI





Review of Systems


General:  Fatigue; No Malaise


HEENT:  No Head Aches


Pulmonary:  No Dyspnea, No Cough


Cardiovascular:  Edema; No: Chest Pain, Palpitations


Gastrointestinal:  No: Nausea, Abdominal Pain


Genitourinary:  No Dysuria; Other (KAHN IN PLACE)


Neurological:  Weakness; No: Confusion





All Other Systems Reviewed


All Other Systems Reviewed:  Yes





Objective


Exam


Vital Signs





Vital Signs - First Documented








 1/21/21 1/22/21





 23:30 03:31


 


Temp 35.2 


 


Pulse 115 


 


Resp 28 


 


B/P (MAP) 192/134 (153) 


 


Pulse Ox 96 


 


O2 Delivery Nasal Cannula 


 


O2 Flow Rate 6.00 


 


FiO2  28





Capillary Refill : Less Than 3 Seconds


General Appearance:  No Apparent Distress, WD/WN


Eyes:  Bilateral Eye Normal Inspection, Bilateral Eye PERRL, Bilateral Eye EOMI


HEENT:  PERRL/EOMI, Pharynx Normal


Neck:  Full Range of Motion, Non Tender, Supple


Respiratory:  Chest Non Tender, No Respiratory Distress, Crackles (RIGHT BASE)


Cardiovascular:  Irregularly Irregular, Tachycardia


Gastrointestinal:  Normal Bowel Sounds, No Organomegaly, No Pulsatile Mass, Non 

Tender, Soft


Rectal:  Deferred


Back:  Normal Inspection, No Vertebral Tenderness


Extremity:  Normal Capillary Refill, Normal Inspection, Normal Range of Motion, 

Non Tender, No Calf Tenderness, Pedal Edema


Neurologic/Psychiatric:  Alert, Oriented x3, No Motor/Sensory Deficits, Normal 

Mood/Affect, CNs II-XII Norm as Tested


Skin:  Warm/Dry


Lymphatic:  No Adenopathy





Results


Lab


Laboratory Tests


1/24/21 04:15: 


White Blood Count 7.6, Red Blood Count 3.49L, Hemoglobin 9.6L, Hematocrit 31L, 

Mean Corpuscular Volume 89, Mean Corpuscular Hemoglobin 28, Mean Corpuscular 

Hemoglobin Concent 31L, Red Cell Distribution Width 15.0H, Platelet Count 267, 

Mean Platelet Volume 9.9, Immature Granulocyte % (Auto) 0, Neutrophils (%) 

(Auto) 67, Lymphocytes (%) (Auto) 19, Monocytes (%) (Auto) 10, Eosinophils (%) 

(Auto) 4, Basophils (%) (Auto) 0, Neutrophils # (Auto) 5.1, Lymphocytes # (Auto)

1.5, Monocytes # (Auto) 0.7, Eosinophils # (Auto) 0.3, Basophils # (Auto) 0.0, 

Immature Granulocyte # (Auto) 0.0, Sodium Level 138, Potassium Level 4.2, 

Chloride Level 103, Carbon Dioxide Level 27, Anion Gap 8, Blood Urea Nitrogen 

25H, Creatinine 0.88, Estimat Glomerular Filtration Rate > 60, BUN/Creatinine 

Ratio 28, Glucose Level 95, Calcium Level 8.6, Phosphorus Level 4.2, Magnesium 

Level 2.1





Microbiology


1/22/21 MRSA Screen - Final, Complete


          MRSA not isolated


1/22/21 Urine Culture - Preliminary, Resulted


          Klebsiella pneumoniae


1/22/21 Blood Culture - Preliminary, Resulted


          No growth





Assessment/Plan


Assessment/Plan


Admission Dx


SINUS TACHYCARDIA


HYPERTENSION


HYPOXIA


COPD


HYPOTHYROIDISM


NSTEMI


PULMONARY EDEMA


PNEUMONIA





SINUS TACHYCARDIA WITH CHF


   - DEFER TO CARDIOLOGY - 


   - PT ON LOVENOX AT THIS TIME


   - LOW DOSE OF LASIX





HYPERTENSION


    - RESUME HOME REGIMEN, ADDED BETABLOCKER THERAPY





HYPOXIA WITH COPD


   - MONITOR SYMPTOMS ON OXYGEN THERAPY AFTER DOSING OF LASIX.





HYPOTHYROIDISM


    - RESUME HOME REGIMEN





NSTEMI


   - DEFER TO CARDIOLOGY -


   - ELEVATED TROPONIN DUE TO HER SEVERE HYPOXIC EVENT





PULMONARY EDEMA


   - SHOULD IMPROVE WITH LASIX





COMMUNITY ACQUIRED PNEUMONIA


   - START ON ROCEPHIN/AZITHROMYCIN THERAPY





DVT PROPHYLAXIS WITH LOVENOX


GI PROPHYLAXIS WITH PPI


Admission Dx


SINUS TACHYCARDIA


HYPERTENSION


HYPOXIA


COPD


HYPOTHYROIDISM


NSTEMI


PULMONARY EDEMA


PNEUMONIA





SINUS TACHYCARDIA WITH CHF


   - DEFER TO CARDIOLOGY - 


   - PT ON LOVENOX AT THIS TIME


   - LOW DOSE OF LASIX





HYPERTENSION


    - RESUME HOME REGIMEN, ADDED BETABLOCKER THERAPY





HYPOXIA WITH COPD


   - MONITOR SYMPTOMS ON OXYGEN THERAPY AFTER DOSING OF LASIX.





HYPOTHYROIDISM


    - RESUME HOME REGIMEN





NSTEMI


   - DEFER TO CARDIOLOGY -


   - ELEVATED TROPONIN DUE TO HER SEVERE HYPOXIC EVENT





PULMONARY EDEMA


   - SHOULD IMPROVE WITH LASIX





COMMUNITY ACQUIRED PNEUMONIA


   - START ON ROCEPHIN/AZITHROMYCIN THERAPY





DVT PROPHYLAXIS WITH LOVENOX


GI PROPHYLAXIS WITH PPI





Clinical Quality Measures


Admission Status


Admission Dx


SINUS TACHYCARDIA


HYPERTENSION


HYPOXIA


COPD


HYPOTHYROIDISM


NSTEMI


PULMONARY EDEMA


PNEUMONIA





SINUS TACHYCARDIA WITH CHF


   - DEFER TO CARDIOLOGY - 


   - PT ON LOVENOX AT THIS TIME


   - LOW DOSE OF LASIX





HYPERTENSION


    - RESUME HOME REGIMEN, ADDED BETABLOCKER THERAPY





HYPOXIA WITH COPD


   - MONITOR SYMPTOMS ON OXYGEN THERAPY AFTER DOSING OF LASIX.





HYPOTHYROIDISM


    - RESUME HOME REGIMEN





NSTEMI


   - DEFER TO CARDIOLOGY -


   - ELEVATED TROPONIN DUE TO HER SEVERE HYPOXIC EVENT





PULMONARY EDEMA


   - SHOULD IMPROVE WITH LASIX





COMMUNITY ACQUIRED PNEUMONIA


   - START ON ROCEPHIN/AZITHROMYCIN THERAPY





DVT PROPHYLAXIS WITH LOVENOX


GI PROPHYLAXIS WITH PPI











ELIJAH WOLFF MD            Jan 24, 2021 09:24

## 2021-01-24 NOTE — PROGRESS NOTE
Subjective


Subjective


Date Seen by Provider:  Jan 24, 2021


Time Seen by Provider:  09:08


PT ADMITTED TO THE HOSPITAL FOR CHF/SINUS TACHYCARDIA, NSTEMI AND UTI


PT REPORTS THAT SHE IS FEELING BETTER TODAY, SHE DENIES CHEST PAIN, SHORTNESS OF

BREATH BEYOND HER USUAL.  SHE HAD A SHOWER THIS MORNING AND FEELS MUCH BETTER.


SHE IS WONDERING ABOUT DISCHARGE TO HOME.





Review of Systems


General:  Fatigue; No Malaise


HEENT:  No Head Aches


Pulmonary:  Dyspnea; No Cough


Cardiovascular:  Edema; No: Chest Pain, Palpitations


Gastrointestinal:  No: Nausea, Abdominal Pain


Genitourinary:  No Dysuria; Other (KAHN IN PLACE)


Neurological:  Weakness; No: Confusion





All Other Systems Reviewed


All Other Systems Reviewed:  Yes





Objective


Exam


Vital Signs





Vital Signs - First Documented








 1/21/21 1/22/21





 23:30 03:31


 


Temp 35.2 


 


Pulse 115 


 


Resp 28 


 


B/P (MAP) 192/134 (153) 


 


Pulse Ox 96 


 


O2 Delivery Nasal Cannula 


 


O2 Flow Rate 6.00 


 


FiO2  28





Capillary Refill : Less Than 3 Seconds


General Appearance:  No Apparent Distress, WD/WN


Eyes:  Bilateral Eye Normal Inspection, Bilateral Eye PERRL, Bilateral Eye EOMI


HEENT:  PERRL/EOMI, Pharynx Normal


Neck:  Full Range of Motion, Non Tender, Supple


Respiratory:  Chest Non Tender, No Accessory Muscle Use, No Respiratory 

Distress, Crackles (RIGHT BASE)


Cardiovascular:  Tachycardia


Gastrointestinal:  Normal Bowel Sounds, No Organomegaly, No Pulsatile Mass, Non 

Tender, Soft


Rectal:  Deferred


Back:  Normal Inspection, No Vertebral Tenderness


Extremity:  Normal Capillary Refill, Normal Inspection, Normal Range of Motion, 

Non Tender, No Calf Tenderness, Pedal Edema


Neurologic/Psychiatric:  Alert, Oriented x3, No Motor/Sensory Deficits, Normal 

Mood/Affect


Skin:  Normal Color, Warm/Dry


Lymphatic:  No Adenopathy





Results


Lab


Laboratory Tests


1/24/21 04:15: 


White Blood Count 7.6, Red Blood Count 3.49L, Hemoglobin 9.6L, Hematocrit 31L, 

Mean Corpuscular Volume 89, Mean Corpuscular Hemoglobin 28, Mean Corpuscular 

Hemoglobin Concent 31L, Red Cell Distribution Width 15.0H, Platelet Count 267, 

Mean Platelet Volume 9.9, Immature Granulocyte % (Auto) 0, Neutrophils (%) 

(Auto) 67, Lymphocytes (%) (Auto) 19, Monocytes (%) (Auto) 10, Eosinophils (%) 

(Auto) 4, Basophils (%) (Auto) 0, Neutrophils # (Auto) 5.1, Lymphocytes # (Auto)

1.5, Monocytes # (Auto) 0.7, Eosinophils # (Auto) 0.3, Basophils # (Auto) 0.0, 

Immature Granulocyte # (Auto) 0.0, Sodium Level 138, Potassium Level 4.2, 

Chloride Level 103, Carbon Dioxide Level 27, Anion Gap 8, Blood Urea Nitrogen 

25H, Creatinine 0.88, Estimat Glomerular Filtration Rate > 60, BUN/Creatinine 

Ratio 28, Glucose Level 95, Calcium Level 8.6, Phosphorus Level 4.2, Magnesium 

Level 2.1





Microbiology


1/22/21 MRSA Screen - Final, Complete


          MRSA not isolated


1/22/21 Urine Culture - Preliminary, Resulted


          Klebsiella pneumoniae


1/22/21 Blood Culture - Preliminary, Resulted


          No growth





Assessment/Plan


Assessment/Plan


Admission Dx


SINUS TACHYCARDIA


HYPERTENSION


HYPOXIA


COPD


HYPOTHYROIDISM


NSTEMI


PULMONARY EDEMA


PNEUMONIA





SINUS TACHYCARDIA WITH CHF


   - DEFER TO CARDIOLOGY - 


   - PT ON LOVENOX AT THIS TIME


   - LOW DOSE OF LASIX





HYPERTENSION


    - RESUME HOME REGIMEN, ADDED BETABLOCKER THERAPY





HYPOXIA WITH COPD


   - MONITOR SYMPTOMS ON OXYGEN THERAPY AFTER DOSING OF LASIX.





HYPOTHYROIDISM


    - RESUME HOME REGIMEN





NSTEMI


   - DEFER TO CARDIOLOGY -


   - ELEVATED TROPONIN DUE TO HER SEVERE HYPOXIC EVENT





PULMONARY EDEMA


   - SHOULD IMPROVE WITH LASIX





COMMUNITY ACQUIRED PNEUMONIA


   - START ON ROCEPHIN/AZITHROMYCIN THERAPY





DVT PROPHYLAXIS WITH LOVENOX


GI PROPHYLAXIS WITH PPI


Assessment and Plan


SINUS TACHYCARDIA


HYPERTENSION


HYPOXIA


COPD


HYPOTHYROIDISM


NSTEMI


PULMONARY EDEMA


PNEUMONIA


KLEBSIELLA UTI








SINUS TACHYCARDIA WITH CHF


   - DEFER TO CARDIOLOGY - 


   - PT ON LOVENOX AT THIS TIME


   - LOW DOSE OF LASIX





HYPERTENSION


    - RESUME HOME REGIMEN, ADDED BETABLOCKER THERAPY





HYPOXIA WITH COPD


   - MONITOR SYMPTOMS ON OXYGEN THERAPY AFTER DOSING OF LASIX.





HYPOTHYROIDISM


    - RESUME HOME REGIMEN





NSTEMI


   - DEFER TO CARDIOLOGY -


   - ELEVATED TROPONIN DUE TO HER SEVERE HYPOXIC EVENT





PULMONARY EDEMA


   - IMPROVING WITH LASIX.





COMMUNITY ACQUIRED PNEUMONIA


   - STARTED ON ROCEPHIN/AZITHROMYCIN THERAPY





KLEBSIELLA UTI


    - CONTINUE WITH ROCEPHIN





DVT PROPHYLAXIS WITH LOVENOX


GI PROPHYLAXIS WITH PPI


Admission Dx


SINUS TACHYCARDIA


HYPERTENSION


HYPOXIA


COPD


HYPOTHYROIDISM


NSTEMI


PULMONARY EDEMA


PNEUMONIA





SINUS TACHYCARDIA WITH CHF


   - DEFER TO CARDIOLOGY - 


   - PT ON LOVENOX AT THIS TIME


   - LOW DOSE OF LASIX





HYPERTENSION


    - RESUME HOME REGIMEN, ADDED BETABLOCKER THERAPY





HYPOXIA WITH COPD


   - MONITOR SYMPTOMS ON OXYGEN THERAPY AFTER DOSING OF LASIX.





HYPOTHYROIDISM


    - RESUME HOME REGIMEN





NSTEMI


   - DEFER TO CARDIOLOGY -


   - ELEVATED TROPONIN DUE TO HER SEVERE HYPOXIC EVENT





PULMONARY EDEMA


   - SHOULD IMPROVE WITH LASIX





COMMUNITY ACQUIRED PNEUMONIA


   - START ON ROCEPHIN/AZITHROMYCIN THERAPY





DVT PROPHYLAXIS WITH LOVENOX


GI PROPHYLAXIS WITH PPI





Clinical Quality Measures


Admission Status


Admission Dx


SINUS TACHYCARDIA


HYPERTENSION


HYPOXIA


COPD


HYPOTHYROIDISM


NSTEMI


PULMONARY EDEMA


PNEUMONIA





SINUS TACHYCARDIA WITH CHF


   - DEFER TO CARDIOLOGY - 


   - PT ON LOVENOX AT THIS TIME


   - LOW DOSE OF LASIX





HYPERTENSION


    - RESUME HOME REGIMEN, ADDED BETABLOCKER THERAPY





HYPOXIA WITH COPD


   - MONITOR SYMPTOMS ON OXYGEN THERAPY AFTER DOSING OF LASIX.





HYPOTHYROIDISM


    - RESUME HOME REGIMEN





NSTEMI


   - DEFER TO CARDIOLOGY -


   - ELEVATED TROPONIN DUE TO HER SEVERE HYPOXIC EVENT





PULMONARY EDEMA


   - SHOULD IMPROVE WITH LASIX





COMMUNITY ACQUIRED PNEUMONIA


   - START ON ROCEPHIN/AZITHROMYCIN THERAPY





DVT PROPHYLAXIS WITH LOVENOX


GI PROPHYLAXIS WITH PPI











ELIJAH WOLFF MD            Jan 24, 2021 09:23

## 2021-01-24 NOTE — DIAGNOSTIC IMAGING REPORT
INDICATION: Pneumonia.



Comparison made with prior examination of 01/23/2021.



FINDINGS: There is cardiomegaly. There is bibasilar atelectasis

and/or pneumonitis right greater than left. There are bilateral

pleural effusion right greater than left. No pneumothorax.

Mediastinum unremarkable



IMPRESSION: Bibasilar atelectasis and/or pneumonitis and

bilateral pleural effusions right greater than left.



Cardiomegaly.



Dictated by: 



  Dictated on workstation # [id number]

## 2021-01-24 NOTE — PROGRESS NOTE - CARDIOLOGY
Cardiology SOAP Progress Note


Subjective:


No cp or palp or syncope


Shortness of breath improving


Malaise improving


No swelling 


No n/v/d


Gen weakness present





Objective:


I&O/Vital Signs











 1/24/21 1/24/21 1/24/21 1/24/21





 07:29 07:45 08:00 10:52


 


Temp   35.4 


 


Pulse   70 


 


Resp   16 


 


B/P (MAP)   175/80 (111) 


 


Pulse Ox 93 98 97 93


 


O2 Delivery Nasal Cannula Nasal Cannula Nasal Cannula Nasal Cannula


 


O2 Flow Rate 2.00 2.00 2.00 2.00





   2.00 


 


    





 1/24/21 1/24/21 1/24/21 1/24/21





 12:00 15:00 15:44 18:37


 


Temp 36.1  35.9 


 


Pulse 60  62 


 


Resp 24  22 


 


B/P (MAP) 161/70 (100)  151/67 (95) 


 


Pulse Ox 96 96 97 95


 


O2 Delivery Nasal Cannula Nasal Cannula Nasal Cannula Nasal Cannula


 


O2 Flow Rate 2.00 2.00 2.00 2.00





 2.00  2.00 














 1/24/21





 00:00


 


Intake Total 1070 ml


 


Output Total 350 ml


 


Balance 720 ml








Constitutional:  AAO x 3, well-developed, well-nourished


Respiratory:  No accessory muscle use, No respiratory distress; chest expansion 

is symmetric, chest is bilaterally symmetric, other (diminished lower  lobes)


Cardiovascular:  irregularly irregular; No JVD; S1 and S2


Gastrointestional:  No tender; soft, round, audible bowel sounds


Extremities:  no lower extremity edema bilateral


Neurologic/Psychiatric:  grossly intact (moves all extremities)


Skin:  No rash on exposed areas, No ulcerations on exposed areas





Results/Procedures:


Labs


Laboratory Tests


1/24/21 04:15: 


White Blood Count 7.6, Red Blood Count 3.49L, Hemoglobin 9.6L, Hematocrit 31L, 

Mean Corpuscular Volume 89, Mean Corpuscular Hemoglobin 28, Mean Corpuscular 

Hemoglobin Concent 31L, Red Cell Distribution Width 15.0H, Platelet Count 267, 

Mean Platelet Volume 9.9, Immature Granulocyte % (Auto) 0, Neutrophils (%) 

(Auto) 67, Lymphocytes (%) (Auto) 19, Monocytes (%) (Auto) 10, Eosinophils (%) 

(Auto) 4, Basophils (%) (Auto) 0, Neutrophils # (Auto) 5.1, Lymphocytes # (Auto)

1.5, Monocytes # (Auto) 0.7, Eosinophils # (Auto) 0.3, Basophils # (Auto) 0.0, 

Immature Granulocyte # (Auto) 0.0, Sodium Level 138, Potassium Level 4.2, 

Chloride Level 103, Carbon Dioxide Level 27, Anion Gap 8, Blood Urea Nitrogen 

25H, Creatinine 0.88, Estimat Glomerular Filtration Rate > 60, BUN/Creatinine 

Ratio 28, Glucose Level 95, Calcium Level 8.6, Phosphorus Level 4.2, Magnesium 

Level 2.1





Microbiology


1/22/21 MRSA Screen - Final, Complete


          MRSA not isolated


1/22/21 Urine Culture - Final, Complete


          Klebsiella pneumoniae


1/22/21 Blood Culture - Preliminary, Resulted


          No growth





Laboratory Tests


1/23/21 05:28








1/24/21 04:15











A/P:


Assessment:





Hypoxia - likely multi-factorial r/t pleural effusion, acute on chronic 

diastolic CHF and acute exacerbation of COPD





Mildly elevated troponin, Type 2 MI secondary to hypoxia and tachycardia





Acute on chronic diastolic congestive heart failure





Echocardiogram in September 2020 reported by Dr. Steven as normal left 

ventricular size and function, EF 55-65 percent, grade 1 diastolic dysfunction, 

mildly dilated left atrium, PA pressure 40 mmHg.





H/O right pleural effusion in November and December 2020 for which she underwent

throracentesis in Nov 2020 by Dr. García





Acute on chronic exacerbation of COPD





Hypertension





Tobaccoism - reports quit in 2020


Plan:





BP and heart rate are better on bb


Continue treatment


Monitor lab closely











CAMILA JOHNSTON MD FACP [facility]   Jan 24, 2021 19:24

## 2021-01-25 LAB
BASOPHILS # BLD AUTO: 0 10^3/UL (ref 0–0.1)
BASOPHILS NFR BLD AUTO: 0 % (ref 0–10)
BUN/CREAT SERPL: 26
CALCIUM SERPL-MCNC: 8.8 MG/DL (ref 8.5–10.1)
CHLORIDE SERPL-SCNC: 103 MMOL/L (ref 98–107)
CO2 SERPL-SCNC: 27 MMOL/L (ref 21–32)
CREAT SERPL-MCNC: 0.92 MG/DL (ref 0.6–1.3)
EOSINOPHIL # BLD AUTO: 0.6 10^3/UL (ref 0–0.3)
EOSINOPHIL NFR BLD AUTO: 8 % (ref 0–10)
GFR SERPLBLD BASED ON 1.73 SQ M-ARVRAT: 58 ML/MIN
GLUCOSE SERPL-MCNC: 89 MG/DL (ref 70–105)
HCT VFR BLD CALC: 31 % (ref 35–52)
HGB BLD-MCNC: 9.5 G/DL (ref 11.5–16)
LYMPHOCYTES # BLD AUTO: 1.5 10^3/UL (ref 1–4)
LYMPHOCYTES NFR BLD AUTO: 21 % (ref 12–44)
MAGNESIUM SERPL-MCNC: 2.2 MG/DL (ref 1.6–2.4)
MANUAL DIFFERENTIAL PERFORMED BLD QL: NO
MCH RBC QN AUTO: 27 PG (ref 25–34)
MCHC RBC AUTO-ENTMCNC: 31 G/DL (ref 32–36)
MCV RBC AUTO: 90 FL (ref 80–99)
MONOCYTES # BLD AUTO: 0.7 10^3/UL (ref 0–1)
MONOCYTES NFR BLD AUTO: 9 % (ref 0–12)
NEUTROPHILS # BLD AUTO: 4.3 10^3/UL (ref 1.8–7.8)
NEUTROPHILS NFR BLD AUTO: 61 % (ref 42–75)
PHOSPHATE SERPL-MCNC: 3.8 MG/DL (ref 2.3–4.7)
PLATELET # BLD: 243 10^3/UL (ref 130–400)
PMV BLD AUTO: 9.8 FL (ref 9–12.2)
POTASSIUM SERPL-SCNC: 4.3 MMOL/L (ref 3.6–5)
SODIUM SERPL-SCNC: 138 MMOL/L (ref 135–145)
WBC # BLD AUTO: 7 10^3/UL (ref 4.3–11)

## 2021-01-25 RX ADMIN — ASPIRIN 81 MG CHEWABLE TABLET SCH MG: 81 TABLET CHEWABLE at 09:01

## 2021-01-25 RX ADMIN — ENOXAPARIN SODIUM SCH MG: 100 INJECTION SUBCUTANEOUS at 09:01

## 2021-01-25 RX ADMIN — ALBUTEROL SULFATE SCH PUFF: 90 AEROSOL, METERED RESPIRATORY (INHALATION) at 11:04

## 2021-01-25 RX ADMIN — FAMOTIDINE SCH MG: 20 TABLET, FILM COATED ORAL at 09:01

## 2021-01-25 RX ADMIN — SODIUM CHLORIDE SCH MLS/HR: 900 INJECTION INTRAVENOUS at 09:01

## 2021-01-25 RX ADMIN — Medication SCH ML: at 05:52

## 2021-01-25 RX ADMIN — AZITHROMYCIN SCH MG: 250 TABLET, FILM COATED ORAL at 09:01

## 2021-01-25 RX ADMIN — LEVOTHYROXINE SODIUM SCH MCG: 125 TABLET ORAL at 05:52

## 2021-01-25 RX ADMIN — ALBUTEROL SULFATE SCH PUFF: 90 AEROSOL, METERED RESPIRATORY (INHALATION) at 07:36

## 2021-01-25 NOTE — D/C HH FACE TO FACE ORDER
D/C  Face to Face Orders


Reconcile Patient Problems


Problems Reviewed?:  Yes





Instructions for Patient


[facility] [employer] [facility], [phone]


Patient Instructions/FollowUp:  


NEW CLINIC 1 WK


Physician to follow Patient:  NEW


Discharge Diet for Home:  Low Sodium Diet


Patient Problems:  


HYPERTENSION


CHF, COPD, ADVANCED AGE, HYPOXIA, PNEUMONIA


Goals for Patient:  


IMPROVED STRENGTH, IMPROVED STAMINA





Patient Data-Allergies,Ht & Wt


Patient Allergies:  


Coded Allergies:  


     Tetracyclines (Verified  Allergy, Unknown, 9/25/20)


Uncoded Allergies:  


     PCN (Allergy, Unknown, 9/25/20)





Home Health Need/Face to Face


Date of Face to Face:  Jan 25, 2021


Clinical Findings:  Generalized weakness and fatigue, Muscle weakness, Shortness

of breath, Unsteady gait


I have seen Pt face-to-face:  Yes


Discharged To:  Home


Diagnosis/Conditions:  


HYPERTENSION


CHF, COPD, ADVANCED AGE, HYPOXIA, PNEUMONIA


Patient is Homebound due to:  Muscle weakness, Shortness of breath/distress


Homebound Status


   Due to the above stated illness, injury or surgical procedure (medical 

condition or diagnosis) and associated clinical findings, the patient is 

homebound because of his/her inability to leave home except with aid of a 

supportive device and/or person AND leaving the home requires a considerable and

taxing effort or is medically contraindicated.


Pt req the following assistanc:  Walker





Home Health Nursing Orders


Home Health Services Order:  Nursing Services, Physical Therapy-Evaluate & Treat





CBC AND CMP ONE DAY PRIOR TO NEXT APPT IN A WEEK





Therapy Orders


Therapy Orders:  Physical Therapy, PT to assess for OT


Therapy Specific Orders:  Teach enviro modifications/safety, Increase 

strength/endurance


Certify Stmt


I certify that this patient is under my care and that I, a nurse practitioner or

a physician; a assistant working with me, had a face to face encounter that -

meets the physician face to face encounter requirements with this patient as 

dated.











ELIJAH WOLFF MD            Jan 25, 2021 09:15

## 2021-01-25 NOTE — DISCHARGE SUMMARY
Diagnosis/Chief Complaint


Date of Admission


Jan 22, 2021 at 00:50


Date of Discharge





Reason Hospital Visit


PT IS AN 85 Y/O FEMALE WHO IS KNOWN TO ME FROM CLINIC AS A NEW PATIENT IN MY 

PRACTICE.


SHE STARTED TO HAVE A LOT OF SHORTNESS OF BREATH, AND FELT LIKE HER HEART WAS 

POUNDING, SHE WAS ALSO ACUTELY NAUSEATED.  SHE REPORTS THAT HER OXYGEN LEVEL WAS

LOW DESPITE BEING ON 4L O2 AT HOME.


SHE WAS ADMITTED FOR CHF WITH ELEVATED TROPONIN





Discharge Summary


Discharge Physical Examination


Allergies:  


Coded Allergies:  


     Tetracyclines (Verified  Allergy, Unknown, 9/25/20)


Uncoded Allergies:  


     PCN (Allergy, Unknown, 9/25/20)


Vitals & I&Os





Vital Signs








  Date Time  Temp Pulse Resp B/P (MAP) Pulse Ox O2 Delivery O2 Flow Rate FiO2


 


1/25/21 07:40     98 Nasal Cannula 2.00 


 


1/25/21 00:03 35.9 58 20 148/72 (97)    


 


1/22/21 03:31        28











Hospital Course


Pending Labs


Laboratory Tests


1/25/21 05:34: 


White Blood Count 7.0, Red Blood Count 3.47, Hemoglobin 9.5, Hematocrit 31, Mean

Corpuscular Volume 90, Mean Corpuscular Hemoglobin 27, Mean Corpuscular 

Hemoglobin Concent 31, Red Cell Distribution Width 14.8, Platelet Count 243, 

Mean Platelet Volume 9.8, Immature Granulocyte % (Auto) 0, Neutrophils (%) 

(Auto) 61, Lymphocytes (%) (Auto) 21, Monocytes (%) (Auto) 9, Eosinophils (%) 

(Auto) 8, Basophils (%) (Auto) 0, Neutrophils # (Auto) 4.3, Lymphocytes # (Auto)

1.5, Monocytes # (Auto) 0.7, Eosinophils # (Auto) 0.6, Basophils # (Auto) 0.0, 

Immature Granulocyte # (Auto) 0.0, Sodium Level 138, Potassium Level 4.3, 

Chloride Level 103, Carbon Dioxide Level 27, Anion Gap 8, Blood Urea Nitrogen 

24, Creatinine 0.92, Estimat Glomerular Filtration Rate 58, BUN/Creatinine Ratio

26, Glucose Level 89, Calcium Level 8.8, Phosphorus Level 3.8, Magnesium Level 

2.2








Discharge


Instructions to patient/family


Please see electronic discharge instructions given to patient.


Discharge Medications


Reviewed and agree with Discharge Medication list on patient's Discharge 

Instruction sheet











ELIJAH WOLFF MD            Jan 25, 2021 09:07

## 2021-01-25 NOTE — NUR
CM/SS visited with patient for discharge planning. 



Plan: The patient will discharge home today 1/25/21 with home health. 



Home Health: The patient is on service with [facility] [employer] [facility]. YU/JONA contacted the agency 
and spoke with Kimberli to inform them of discharge today and faxed finalized home health orders. 




Oxygen: The patient uses a DME in [place] for her oxygen. The patient wears 4L at baseline. 
She has 2 concentrators at home. Her sister Loraine is bringing portable to the hospital. 



No further needs at this time.

## 2021-01-25 NOTE — DIAGNOSTIC IMAGING REPORT
Indication: Followup pneumonia and effusions.



Time of exam: 10:54 AM



Correlation is made with prior chest from one day earlier.



Bilateral effusions, right greater persists. Lungs appear to be

fairly clear. A rounded density in the right base is unchanged.

Mid and upper lung fields are clear. No pneumothorax is detected.



Impression: Stable chest since exam one day earlier.



Dictated by: 



  Dictated on workstation # [id number]

## 2021-08-23 NOTE — NUR
THIS RT SUGGESTS THAT PATIENT SHOULD NOT HAVE INHALERS.  WHILE IN THE HOSPITAL THERAPISTS 
CAN WATCH HOW MANY PUFFS SHE CAN HAVE.  PATIENT HAS BEEN EDUCATED BY SEVERAL RTS ON USE OF 
INHALER AND THE PRO'S AND CON'S.  PATIENT STATES THAT SHE TAKES 100 PUFFS EACH TIME SHE USES 
INHALER .  AFTER FINDING OUT THE CON'S OF OVER USE OF INHALER.  PATIENT STATES THAT SHE WILL 
ONLY USE 50 PUFFS EACH TIME NOW.  PATIENT STATES THAT IT HELPS HER FEEL LIKE SHE CAN BE ABLE 
TO WALK UP STAIRS.  

-------------------------------------------------------------------------------

Addendum: 01/24/21 at 1631 by NELL BABB RT

-------------------------------------------------------------------------------

THIS RT SUGGESTS THAT PATIENT SHOULD NOT HAVE INHALERS.  WHILE IN THE HOSPITAL THERAPISTS 
CAN WATCH HOW MANY PUFFS SHE CAN HAVE.  PATIENT HAS BEEN EDUCATED BY SEVERAL RTS ON USE OF 
INHALER AND THE PRO'S AND CON'S.  PATIENT STATES THAT SHE TAKES 100 PUFFS EACH TIME SHE USES 
INHALER .  AFTER FINDING OUT THE CON'S OF OVER USE OF INHALER.  PATIENT STATES THAT SHE WILL 
ONLY USE 50 PUFFS EACH TIME AT HOME.  PATIENT STATES THAT IT HELPS HER FEEL LIKE SHE CAN BE 
ABLE TO WALK UP STAIRS. To assist the healthcare team in an ethical dilemma of an 80-year-old male admitted for pulmonary embolism, now intubated and sedated secondary to respiratory failure, currently with guarded prognosis and unknown surrogacy. Could not palpate or doppler radial pulse and cyanotic fingers GIB Rehab evaluation acute respiratory failure GOC Prostate abscess/ Prostatitis

## 2021-10-18 ENCOUNTER — HOSPITAL ENCOUNTER (EMERGENCY)
Dept: HOSPITAL 75 - ER | Age: 86
Discharge: HOME | End: 2021-10-18
Payer: MEDICARE

## 2021-10-18 VITALS — HEIGHT: 62.99 IN | WEIGHT: 189.6 LBS | BODY MASS INDEX: 33.59 KG/M2

## 2021-10-18 VITALS — DIASTOLIC BLOOD PRESSURE: 94 MMHG | SYSTOLIC BLOOD PRESSURE: 167 MMHG

## 2021-10-18 DIAGNOSIS — I50.9: ICD-10-CM

## 2021-10-18 DIAGNOSIS — I11.0: ICD-10-CM

## 2021-10-18 DIAGNOSIS — Z79.890: ICD-10-CM

## 2021-10-18 DIAGNOSIS — E03.9: ICD-10-CM

## 2021-10-18 DIAGNOSIS — M54.50: Primary | ICD-10-CM

## 2021-10-18 DIAGNOSIS — J44.9: ICD-10-CM

## 2021-10-18 DIAGNOSIS — Z79.82: ICD-10-CM

## 2021-10-18 DIAGNOSIS — Z79.899: ICD-10-CM

## 2021-10-18 LAB
APTT PPP: YELLOW S
BACTERIA #/AREA URNS HPF: (no result) /HPF
BILIRUB UR QL STRIP: NEGATIVE
FIBRINOGEN PPP-MCNC: (no result) MG/DL
GLUCOSE UR STRIP-MCNC: NEGATIVE MG/DL
KETONES UR QL STRIP: NEGATIVE
LEUKOCYTE ESTERASE UR QL STRIP: NEGATIVE
NITRITE UR QL STRIP: NEGATIVE
PH UR STRIP: 6 [PH] (ref 5–9)
PROT UR QL STRIP: NEGATIVE
RBC #/AREA URNS HPF: (no result) /HPF
SP GR UR STRIP: 1.02 (ref 1.02–1.02)
SQUAMOUS #/AREA URNS HPF: (no result) /HPF
WBC #/AREA URNS HPF: (no result) /HPF

## 2021-10-18 PROCEDURE — 81000 URINALYSIS NONAUTO W/SCOPE: CPT

## 2021-10-18 PROCEDURE — 72131 CT LUMBAR SPINE W/O DYE: CPT

## 2021-10-18 PROCEDURE — 72128 CT CHEST SPINE W/O DYE: CPT

## 2021-10-18 NOTE — XMS REPORT
CCD document using C-CDA

                             Created on: 10/07/2021



Tierra Rizvi

External Reference #: [id number]

: 1934

Sex: Female



Demographics





                          Address                   [address]  [place]  [address]

 

                          Home Phone                [phone]

 

                          Preferred Language        Unknown

 

                          Marital Status            Unknown

 

                          Anabaptist Affiliation     Unknown

 

                          Race                      White

 

                          Ethnic Group              Not  or 





Author





                          Author                    Tierra Moreno

 

                          Organization              Kimberly Souza MD, [facility]

 

                          Address                   [address] [place]  [address]



 

                          Phone                     [phone]







Care Team Providers





                    Care Team Member Name Role                Phone

 

                    Kimberly Souza     PP                  Unavailable

 

                          CCM                       Unavailable







Summary Purpose

Interface Exchange



Insurance Providers





             Payer name   Policy type / Coverage type Covered party ID Effective

 Begin Date 

Effective End Date

 

             [employer] Medicare Part B Medicare Part B [id number]  Unknown      Unkno

wn

 

             [facility] Medicare Part B [id number] Unkno

wn      Unknown







Family history





Sister



                          Diagnosis                 Age At Onset

 

                          Arthritis                 Unknown

 

                          Hypertension              Unknown

 

                          Anemia                    Unknown

 

                          Diabetes mellitus Type 2  Unknown

 

                          Osteoporosis              Unknown







Son



                          Diagnosis                 Age At Onset

 

                          Myocardial infarction     Unknown

 

                          Hypertension              Unknown

 

                          Diabetes mellitus Type 2  Unknown







Father



                          Diagnosis                 Age At Onset

 

                          Cancer                    Unknown

 

                          Alcoholism                Unknown

 

                          kidney disease            Unknown







Mother



                          Diagnosis                 Age At Onset

 

                          Cancer                    Unknown







Brother



                          Diagnosis                 Age At Onset

 

                          Hypertension              Unknown

 

                          Cancer                    Unknown

 

                          Alcoholism                Unknown

 

                          Diabetes mellitus Type 2  Unknown

 

                          kidney disease            Unknown

 

                          Myocardial infarction     Unknown







Social History





                Social History Element Codes           Description     Effective

 Dates

 

                Marital status  Unknown                  2020

 

                Employment      Unknown         Retired         2020

 

                    Tobacco history     SNOMED CT: 8715462  Quit over 10 years a

go

                          2020

 

                Alcohol history SNOMED CT: 860050706 Never drinks alcohol 2020







Allergies, Adverse Reactions, Alerts





           Substance  Reaction   Codes      Entered Date Inactivated Date Status

 

           Penicillin rash,      Unknown    2020 No Inactive Date Active

 

             * OTHER REACTION - SEE ANSWER BOX Tetracycline- Rash Unknown      0

2020   No 

Inactive Date                           Active







Problems





                Condition       Codes           Effective Dates Condition Status

 

                          CHF (congestive heart failure) ICD-10: I50.9

ICD-9: 428.0              2020                Active

 

                          On supplemental oxygen therapy ICD-10: Z99.81

ICD-9: V46.2              10/15/2020                Active

 

                          Atrophy of thyroid (acquired) ICD-10: E03.4

ICD-9: 244.8              10/15/2020                Active

 

                          Essential (primary) hypertension ICD-10: I10

ICD-9: 401.1              10/15/2020                Active

 

                          Vitamin B12 deficiency (dietary) anemia ICD-10: D51.8

ICD-9: 281.1              2021                Active

 

                          Localized edema           ICD-10: R60.0

ICD-9: 782.3              06/15/2021                Active

 

                          Acute on chronic diastolic congestive heart failure IC

D-10: I50.33

ICD-9: 428.33             2021                Active

 

                          Anemia                    ICD-10: D64.9

ICD-9: 285.9              2021                Active

 

                          Dementia without behavioral disturbance, unspecified d

ementia type ICD-10: 

F03.90

ICD-9: 294.20             2020                Active







Medications





          Medication Codes     Instructions Start Date Stop Date Status    Fill 

Instructions

 

                levothyroxine 112 mcg tablet RxNorm: 388232  1 Tablet(s) Oral ev

berta day 

10/07/2021          10/07/2021          Inactive             

 

                levothyroxine 112 mcg tablet RxNorm: 884812  1 Tablet(s) Oral ev

berta day 

2021          10/07/2021          Inactive             

 

                    cyanocobalamin (vit B-12) 1,000 mcg/mL injection solution Rx

Norm: 064848      Take 

Milliliter(s) Injection 2021      Inactive         

 

                    cyanocobalamin (vit B-12) 1,000 mcg/mL injection solution Rx

Norm: 112389      Take 

Milliliter(s) Injection 2021      Inactive         

 

                          albuterol sulfate 2.5 mg/3 mL (0.083 %) solution for n

ebulization RxNorm: 772725

             1 Unit Dose Inhalation two times a day DX J43.1 2021   Inactive      

 

                losartan 50 mg tablet RxNorm: 576818  1 Tablet(s) Oral two times

 a day 2021          Active               

 

                          albuterol sulfate 2.5 mg/3 mL (0.083 %) solution for n

ebulization RxNorm: 725741

             1 Unit Dose Inhalation two times a day DX J43.1 2021   Inactive      

 

                          albuterol sulfate 2.5 mg/3 mL (0.083 %) solution for n

ebulization RxNorm: 109626

             1 Unit Dose Inhalation two times a day 2021   In

active      

 

                          albuterol sulfate 2.5 mg/3 mL (0.083 %) solution for n

ebulization RxNorm: 516708

             1 Unit Dose Inhalation two times a day 2021   In

active      

 

             Norvasc 5 mg tablet RxNorm: 646797 1 Tablet(s) Oral every day 2022                Active                     

 

             Norvasc 5 mg tablet RxNorm: 989526 1 Tablet(s) Oral every day 2021                Inactive                   

 

                    metoprolol succinate ER 25 mg tablet,extended release 24 hr 

RxNorm: 804054      1 

Tablet(s) Oral every day 2021      No Stop Date    Active           

 

                    Ventolin HFA 90 mcg/actuation aerosol inhaler RxNorm: 663438

      1-2 Puff(s) 

Inhalation Every 4 hrs as needed 2021      No Stop Date    Active         

  

 

             furosemide 20 mg tablet RxNorm: 730860 1 Tablet(s) Oral every day 0

2021   No 

Stop Date                 Active                     

 

                    furosemide 40 mg tablet RxNorm: 880381      1 Tablet(s) Oral

 as directed 40mg BID x 5

days then 40mg in am and 20mg afternoon thereafter 2020          

Inactive                                give qty sufficient

 

                    potassium chloride ER 10 mEq tablet,extended release RxNorm:

 207975      1 Tablet(s) 

Oral as directed 1 tab bid x 5 days then resume daily 2020          

Inactive                                qty sufficient

 

                    potassium bicarbonate-citric acid 10 mEq effervescent tablet

 RxNorm: 2827421     1 

Tablet(s) Oral every day 10/15/2020      2020      Inactive         

 

                levothyroxine 125 mcg tablet RxNorm: 179933  1 Tablet(s) Oral ev

berta day 

10/15/2020          2021          Inactive             

 

                losartan 50 mg tablet RxNorm: 255259  1 Tablet(s) Oral two times

 a day 10/15/2020

                    2021          Inactive             

 

             furosemide 40 mg tablet RxNorm: 415607 1 Tablet(s) Oral every day 1

0/15/2020   

2020                Inactive                   

 

             Zyrtec 10 mg tablet RxNorm: 3304586 1 Tablet(s) Oral every day 10/0

2020   

2021                Inactive                   

 

                levothyroxine 125 mcg tablet RxNorm: 859429  1 Tablet(s) Oral ev

berta day 

10/05/2020          10/14/2020          Inactive             

 

             Zyrtec 10 mg tablet RxNorm: 1382605 1 Tablet(s) Oral every day 10/0

2020   

10/04/2020                Inactive                   

 

                aspirin 81 mg tablet,delayed release RxNorm: 425374  1 Tablet(s)

 Oral every day 

2020          No Stop Date        Active               

 

             Vitamin C 250 mg tablet RxNorm: 576276 1 Tablet(s) Oral every day 0

2020   No 

Stop Date                 Active                     

 

                Vitamin D3 50 mcg (2,000 unit) tablet RxNorm: 773281  1 Tablet(s

) Oral every day 

2020          No Stop Date        Active               

 

                levothyroxine 88 mcg tablet RxNorm: 697599  1 Tablet(s) Oral ledy

ry day 2020

                    10/04/2020          Inactive             

 

             losartan 50 mg tablet RxNorm: 046932 1 Tablet(s) Oral every day 09/

   

10/14/2020                Inactive                   

 

             furosemide 40 mg tablet RxNorm: 814671 1 Tablet(s) Oral every day 0

2020   

10/14/2020                Inactive                   

 

                    potassium bicarbonate-citric acid 10 mEq effervescent tablet

 RxNorm: 0076725     1 

Tablet(s) Oral every day 2020      10/14/2020      Inactive         

 

          Women's Multivitamin oral RxNorm:   oral      2020           Act

maryjo     

 

          Calcium 600 oral RxNorm: 1897 oral      2020           Active   

  

 

          albuterol sulfate inhalation RxNorm: 469387 inhalation 2020     

      Active     







Medication Administered





             Medication   Codes        Instructions Start Date   Status

 

                    cyanocobalamin (vit B-12) 1,000 mcg/mL injection solution Rx

Norm: 915443      

Milliliter                2021                No longer Active

 

                    cyanocobalamin (vit B-12) 1,000 mcg/mL injection solution Rx

Norm: 591928      

Milliliter                2021                No longer Active







Immunizations





                Vaccine         Codes           Date            Status

 

                Covid-19        CVX: 207        04/10/2021       

 

                Covid-19        CVX: 207        2021       

 

                Pneumococcal (Adult) Unknown         2019       

 

                Zoster          CVX: 121        2017       







Results





          Observation Observation Code Item      Item Code Result    Date      S

ervice Location

 

          Cbc With Differential Ord2      WBC                 8.64 K/ul 20

21 Unknown

 

          Cbc With Differential Ord2      RBC                 3.76 M/ul 20

21 Unknown

 

          Cbc With Differential Ord2      HGB                 9.9 g/dl  20

21 Unknown

 

          Cbc With Differential Ord2      Neut%               61.9 %    20

21 Unknown

 

          Cbc With Differential Ord2      HCT                 33.4 %    20

21 Unknown

 

          Cbc With Differential Ord2      MCV                 88.8 fl   20

21 Unknown

 

          Cbc With Differential Ord2      Lymph%              28.1 %    20

21 Unknown

 

          Cbc With Differential Ord2      Mono%               8.0 %     20

21 Unknown

 

          Cbc With Differential Ord2      MCH                 26.3 pg   20

21 Unknown

 

          Cbc With Differential Ord2      MCHC                29.6 pg   20

21 Unknown

 

          Cbc With Differential Ord2      Eos%                1.9 %     20

21 Unknown

 

          Cbc With Differential Ord2      PLT                 332 K/ul  20

21 Unknown

 

          Cbc With Differential Ord2      Baso%               0.1 %     20

21 Unknown

 

          Cbc With Differential Ord2      RDW                 15.2 %    20

21 Unknown

 

          Cbc With Differential Ord2      Neut ABS#           5.35 K/ul 20

21 Unknown

 

          Cbc With Differential Ord2      Lymph ABS#           2.43 K/ul 

021 Unknown

 

          Cbc With Differential Ord2      Mono ABS#           0.7 K/ul  20

21 Unknown

 

          Cbc With Differential Ord2      Eos ABS#            0.2 K/ul  20

21 Unknown

 

          Cbc With Differential Ord2      Baso ABS#           0.0 K/ul  20

21 Unknown

 

          Tsh       Ord6      TSH (3rd IS)           0.21 uIU/mL 2021 Unkn

own

 

          Comp Metabolic Xpj449    NA                  141 mEq/L 2021 Unkn

own

 

          Comp Metabolic Vrh303    K                   4.1 mEq/L 2021 Unkn

own

 

          Comp Metabolic Nkd784    CL                  104 mEq/L 2021 Unkn

own

 

          Comp Metabolic Qvk630    CO2                 28.0 mEq/L 2021 Unk

nown

 

          Comp Metabolic Rxh354    ANION GAP           13        2021 Unkn

own

 

          Comp Metabolic Lol906    GLUCOSE             78 mg/dL  2021 Unkn

own

 

          Comp Metabolic Qju627    Creat               0.8 mg/dL 2021 Unkn

own

 

          Comp Metabolic Tbv005    eGFR                71 ml/min/1.73m2 20

21 Unknown

 

          Comp Metabolic Nzb258    BUN                 25 mg/dL  2021 Unkn

own

 

          Comp Metabolic Caa267    B/C Ratio           30.9 Ratio 2021 Unk

nown

 

          Comp Metabolic Utv431    CALCIUM             8.7 mg/dL 2021 Unkn

own

 

          Comp Metabolic Okm976    ALK PHOS            61 U/L    2021 Unkn

own

 

          Comp Metabolic Mpy580    AST(SGOT)           17 U/L    2021 Unkn

own

 

          Comp Metabolic Kjy346    ALT(SGPT)           10 U/L    2021 Unkn

own

 

          Comp Metabolic Zat435    BILI T              0.6 mg/dL 2021 Unkn

own

 

          Comp Metabolic Wec254    ALBUMIN             3.7 g/dL  2021 Unkn

own

 

          Comp Metabolic Szr505    TPRO                6.1 g/dL  2021 Unkn

own

 

          Comp Metabolic Luc541    GLOB                2.4 g/dL  2021 Unkn

own

 

          Comp Metabolic Dmf535    A/G Ratio           1.6 Ratio 2021 Unkn

own

 

          Comp Metabolic Zrt151    Osmo                285 mOsmo 2021 Unkn

own

 

          B12       Vov487    B12                 >1500.00 pg/ml 2021 Unkn

own

 

          Free T4   Xhu251    FREE T4             1.23 ng/dL 2021 Unknown

 

          Tibc      Ord40     Iron                32 ug/dl  2021 Unknown

 

          Tibc      Ord40     UIBC                402 ug/dL 2021 Unknown

 

          Tibc      Ord40     TIBC                434 ug/dL 2021 Unknown

 

          Tibc      Ord40     Fe-%Sat             7.4 %     2021 Unknown

 

          Ferritin  Ord22     FERRITIN            27.6 ng/mL 2021 Unknown

 

          B12       Ony266    B12                 187.00 pg/ml 2021 Unknow

n

 

          Comp Metabolic Uau186    NA                  139 mEq/L 2021 Unkn

own

 

          Comp Metabolic Uje138    K                   4.1 mEq/L 2021 Unkn

own

 

          Comp Metabolic Fdm826    CL                  103 mEq/L 2021 Unkn

own

 

          Comp Metabolic Tel890    CO2                 27.0 mEq/L 2021 Unk

nown

 

          Comp Metabolic Afj389    ANION GAP           13        2021 Unkn

own

 

          Comp Metabolic Qwl588    GLUCOSE             79 mg/dL  2021 Unkn

own

 

          Comp Metabolic Yyi494    Creat               0.7 mg/dL 2021 Unkn

own

 

          Comp Metabolic Snm999    eGFR                80 ml/min/1.73m2 20

21 Unknown

 

          Comp Metabolic Eel369    BUN                 21 mg/dL  2021 Unkn

own

 

          Comp Metabolic Bmp676    B/C Ratio           28.8 Ratio 2021 Unk

nown

 

          Comp Metabolic Nwq140    CALCIUM             8.7 mg/dL 2021 Unkn

own

 

          Comp Metabolic Xfy111    ALK PHOS            74 U/L    2021 Unkn

own

 

          Comp Metabolic Bip854    AST(SGOT)           15 U/L    2021 Unkn

own

 

          Comp Metabolic Srf483    ALT(SGPT)           17 U/L    2021 Unkn

own

 

          Comp Metabolic Irv035    BILI T              0.4 mg/dL 2021 Unkn

own

 

          Comp Metabolic Zey016    ALBUMIN             3.5 g/dL  2021 Unkn

own

 

          Comp Metabolic Bpy849    TPRO                5.9 g/dL  2021 Unkn

own

 

          Comp Metabolic Dkl363    GLOB                2.4 g/dL  2021 Unkn

own

 

          Comp Metabolic Ohj526    A/G Ratio           1.4 Ratio 2021 Unkn

own

 

          Comp Metabolic Dkm945    Osmo                279 mOsmo 2021 Unkn

own

 

          Cbc With Differential Ord2      WBC                 7.63 K/ul 20

21 Unknown

 

          Cbc With Differential Ord2      RBC                 3.38 M/ul 20

21 Unknown

 

          Cbc With Differential Ord2      HGB                 9.4 g/dl  20

21 Unknown

 

          Cbc With Differential Ord2      Neut%               52.6 %    20

21 Unknown

 

          Cbc With Differential Ord2      HCT                 31.5 %    20

21 Unknown

 

          Cbc With Differential Ord2      MCV                 93.2 fl   20

21 Unknown

 

          Cbc With Differential Ord2      Lymph%              33.8 %    20

21 Unknown

 

          Cbc With Differential Ord2      MCH                 27.8 pg   20

21 Unknown

 

          Cbc With Differential Ord2      Mono%               7.9 %     20

21 Unknown

 

          Cbc With Differential Ord2      MCHC                29.8 pg   20

21 Unknown

 

          Cbc With Differential Ord2      Eos%                5.4 %     20

21 Unknown

 

          Cbc With Differential Ord2      Baso%               0.3 %     20

21 Unknown

 

          Cbc With Differential Ord2      PLT                 329 K/ul  20

21 Unknown

 

          Cbc With Differential Ord2      Neut ABS#           4.02 K/ul 20

21 Unknown

 

          Cbc With Differential Ord2      RDW                 15.2 %    20

21 Unknown

 

          Cbc With Differential Ord2      Lymph ABS#           2.58 K/ul 

021 Unknown

 

          Cbc With Differential Ord2      Mono ABS#           0.6 K/ul  20

21 Unknown

 

          Cbc With Differential Ord2      Eos ABS#            0.4 K/ul  20

21 Unknown

 

          Cbc With Differential Ord2      Baso ABS#           0.0 K/ul  20

21 Unknown







Procedures





                    Procedure           Codes               Date

 

                    THER/PROPH/DIAG INJ SC/IM CPT-4: 97735        2021

 

                    VITAMIN B12 INJECTION 1000 mcg CPT-4:         

 

                    THER/PROPH/DIAG INJ SC/IM CPT-4: 24473        2021







Vital Signs





                          Date                      Vital

 

                2021      SpO2: 96%       SpO2: 87%       SpO2: 94%

 

                2021      Blood Pressure 1: 138/82 Code: 8480-6 BMI: 33.5 

Code: 48856-6 Heart 

Rate 1: 74 bpm      Height: 5'3" Code: 8302-2 SpO2: 98%           Temperature: 3

6.2 (C) / 97.1 

(F)                                     Weight: 189 lbs  Code: 20580-0

 

                06/15/2021      Blood Pressure 1: 134/80 Code: 8480-6 Heart Rate

 1: 77 bpm Height: 

5'3" Code: 8302-2         SpO2: 93%                 Temperature: 36.3 (C) / 97.3

 (F)

 

                2021      Blood Pressure 1: 136/88 Code: 8480-6 Heart Rate

 1: 60 bpm Height:  

Code: 8302-2        SpO2: 93%           Temperature: 36.3 (C) / 97.3 (F) Weight:

 174 lbs  Code: 

80836-4

 

                2021      Blood Pressure 1: 164/92 Code: 8480-6 BMI: 31.4 

Code: 31805-1 Heart 

Rate 1: 67 bpm      Height: 5'3" Code: 8302-2 SpO2: 94%           Temperature: 3

6.2 (C) / 97.1 

(F)                                     Weight: 177 lbs  Code: 38274-1

 

                2021      Blood Pressure 1: 134/72 Code: 8480-6 BMI: 30.8 

Code: 66408-8 Heart 

Rate 1: 74 bpm  Height: 5'3" Code: 8302-2 Respiratory Rate: 18 bpm SpO2: 95%    

   

Temperature: 36.3 (C) / 97.4 (F)        Weight: 174 lbs  Code: 43592-3

 

                10/15/2020      Blood Pressure 1: 202/94 Code: 8480-6 BMI: 30.6 

Code: 36982-4 Heart 

Rate 1: 73 bpm  Height: 5'3" Code: 8302-2 Respiratory Rate: 17 bpm SpO2: 91%    

   

Temperature: 36.8 (C) / 98.2 (F)        Weight: 173 lbs  Code: 50516-5

 

                2020      Blood Pressure 1: 144/90 Code: 8480-6 BMI: 29.4 

Code: 41788-9 Heart 

Rate 1: 72 bpm      Height: 5'3" Code: 8302-2 SpO2: 98%           Temperature: 3

6.7 (C) / 98.0 

(F)                                     Weight: 166 lbs  Code: 16949-8







Functional Status

No Functional Status data



Reason For Visit





                    Reason For Visit    Effective Dates     Notes

 

                    hypertension        2021           

 

                    hypertension        06/15/2021           

 

                    hypertension        2021           

 

                    Hospital Follow Up  2021           

 

                    shortness of breath 2021           

 

                    shortness of breath 10/15/2020           

 

                    Hospital Follow Up  2020           







Encounters





             Encounter    Performer    Location     Codes        Date

 

                                        [phone] EST. PATIENT, LEVEL I

Diagnosis: CHF (congestive heart failure)[ICD10: I50.9]

Diagnosis: On supplemental oxygen therapy[ICD10: Z99.81] [facility]          CPT-4: 49818              2021

 

                                        [phone] EST. PATIENT, LEVEL IV

Diagnosis: Vitamin B12 deficiency (dietary) anemia[ICD10: D51.8]

Diagnosis: Essential (primary) hypertension[ICD10: I10]

Diagnosis: Atrophy of thyroid (acquired)[ICD10: E03.4] [facility]          CPT-4: 63859              2021

 

                                        [phone] EST. PATIENT, LEVEL IV

Diagnosis: Essential (primary) hypertension[ICD10: I10]

Diagnosis: Vitamin B12 deficiency (dietary) anemia[ICD10: D51.8]

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

Diagnosis: Localized edema[ICD10: R60.0] Franca mejia MD, [facility]

                          CPT-4: 42850              06/15/2021

 

                                        [phone] EST. PATIENT, LEVEL IV

Diagnosis: CHF (congestive heart failure)[ICD10: I50.9]

Diagnosis: Essential (primary) hypertension[ICD10: I10]

Diagnosis: Vitamin B12 deficiency (dietary) anemia[ICD10: D51.8] Franca Souza MD, [facility] CPT-4: 17456        2021

 

                                        [phone] EST. PATIENT, LEVEL IV

Diagnosis: Acute on chronic diastolic congestive heart failure[ICD10: I50.33]

Diagnosis: Anemia[ICD10: D64.9]

Diagnosis: Essential (primary) hypertension[ICD10: I10] Franca Souza MD, [facility]          CPT-4: 98128              2021

 

                                        [phone] EST. PATIENT, LEVEL IV

Diagnosis: Essential (primary) hypertension[ICD10: I10]

Diagnosis: CHF (congestive heart failure)[ICD10: I50.9]

Diagnosis: Atrophy of thyroid (acquired)[ICD10: E03.4] Kimberly Souza MD, [facility]          CPT-4: 03807              2021

 

                                        [id number] EST. PATIENT, LEVEL IV

Diagnosis: Essential (primary) hypertension[ICD10: I10]

Diagnosis: CHF (congestive heart failure)[ICD10: I50.9]

Diagnosis: On supplemental oxygen therapy[ICD10: Z99.81]

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

 

                                        OFFICE  VISIT, NEW - LEVEL 3

Diagnosis: CHF (congestive heart failure)[ICD10: I50.9]

Diagnosis: Dementia without behavioral disturbance, unspecified dementia 
type[ICD10: F03.90] [facility] CPT-4: 56715    







Plan of Care





             Planned Activity Notes        Codes        Status       Date

 

             Appointment:                            Nurse Visit  2021

 

             Patient Education: Patient Medication Summary                      

     Completed    2021

 

                          Visit Plan:               Hypertension - well controll

ed - continue with current medications,

continue with no added salt diet. Pt has been encouraged to exercise daily. The 
pt has been advised to call the office if there are any acute concerns about 
change in blood pressure readings at home. Hypothyroidism - pt with chronic 
hypothyroidism, continue with current medication, will monitor pt for signs or 
symptoms of lack of adequate supplementation. Pt is to continue with current 
dose of medication unless directed otherwise. Check labs at regular intervals q 
3 months or q 6 months based on previous levels of control. B12 def -injection 
today

                                                            2021

 

                                        Appointment: Franca Urbina 

WPtel:[phone]

                                        [facility] [address]

                                              (30 min) Complex 2021

 

             Patient Education: Patient Medication Summary                      

     Completed    2021

 

             Appointment:                            Injection    2021

 

             Patient Education: Patient Medication Summary                      

     Completed    2021

 

             Patient Education: Patient Medication Summary                      

     Completed    2021

 

             Care Plan: Cbc With Differential                           Pending 

     2021

 

             Care Plan: Comp Metabolic                           Pending      

 

             Care Plan: Tsh                           Pending      2021

 

             Care Plan: Magnesium                           Pending      

021

 

             Care Plan: Free T4                           Pending      

 

                          Visit Plan:               Hypertension - well controll

ed - continue with current medications,

continue with no added salt diet. Pt has been encouraged to exercise daily. The 
pt has been advised to call the office if there are any acute concerns about 
change in blood pressure readings at home. Anemia- recheck labs -continue b12 
injections Edema - pt has been advised to elevate legs to prevent dependent 
edema, compression has been recommended to help to naturally decrease peripheral
edema. Diuretic use has been discussed and pt has been instructed in appropriate
use of such medication as necessary to further attempt to reduce peripheral 
edema. Hypothyroidism -check levels with next labs

                                                            06/15/2021

 

                                        Appointment: Franca Urbina 

WPtel:[phone] [address] [facility]

                                              (30 min) Complex 06/15/2021

 

             Patient Education: Patient Medication Summary                      

     Completed    06/15/2021

 

                                        Appointment: Franca Urbina 

WPtel:[phone]

                                        [address] [facility]

                                              (30 min) Complex 2021

 

                                        Appointment: FestusKimberly 

WPtel:[phone]

                                        [address] [facility]

                                              (15 min) Moderate 04/15/2021

 

                          Visit Plan:               Hypertension - well controll

ed - continue with current medications,

continue with no added salt diet. Pt has been encouraged to exercise daily. The 
pt has been advised to call the office if there are any acute concerns about 
change in blood pressure readings at home. CHF - congestive heart failure - Pt 
has Chronic congestive heart failure - and is currently fairly well maintained 
on the current medications. Today there is no change in the treatment course. If
symptoms worsen, increase edema or more that 2-3 pound weight gain over a week 
without improvement in the symptoms with a decrease in the sodium of the diet, 
then the pt is to have the office alerted. Anemia- continue with B12 injections 
per 

                                                            2021

 

                                        Appointment: Franca Urbina 

WPtel:[phone]

                                        [address] [place], [place] [address]

                                              (30 min) Complex 2021

 

             Patient Education: Patient Medication Summary                      

     Completed    2021

 

                          Visit Plan:               Acute on chronic CHF - patie

nt refuses cardiology -continue with 

oxygen at all times- patient did not wear oxygen to appt- patient and sister 
verbalized understanding of plan. Did recommend follow up with cardiology due to
recurrent hospitalizations for CHF and patient continues to refuse Anemia- 
repeat labs today HTN -controlled- no change in medications today

                                                            2021

 

                                        Appointment: Frnaca Urbina 

WPtel:[phone]

                                        [facility]

                                              (30 min) Complex 2021

 

             Patient Education: Patient Medication Summary                      

     Completed    2021

 

             Patient Education: Patient Medication Summary                      

     Completed    2021

 

                          Visit Plan:               Chronic Congestive heart sheryl

lure - symptoms stable on lasix -pt has

refused referral to Cardiology - she is not interested in any other physician 
and will not go to another specialist per her report - pt to continue with 
chronic oxygen therapy. Hypertension - well controlled - continue with current 
medications, continue with no added salt diet. Pt has been encouraged to 
exercise daily. The pt has been advised to call the office if there are any 
acute concerns about change in blood pressure readings at home. Hypothyroidism -
pt with chronic hypothyroidism, continue with current medication, will monitor 
pt for signs or symptoms of lack of adequate supplementation. Pt is to continue 
with current dose of medication unless directed otherwise. Check labs at regular
intervals q 3 months or q 6 months based on previous levels of control.

                                                            2021

 

             Patient Education: Patient Medication Summary                      

     Completed    2021

 

                                        Appointment: Kimberly Souza 

WPtel:[phone]

                                        [address] [place], [place] [address]

                                              (30 min) Complex 2020

 

                          Visit Plan:               Chronic Congestive heart sheryl

lure - symptoms stable on lasix - need 

to initiate referral to Dr. Mendoza - if not already done - pt to continue with 
chronic oxygen therapy - due to her weakness and need for easier to transport of
the oxygen from place to place, I have recommended that she needs a portable 
oxygen concentrator. She is too weak to transport large oxygen bottles from 
place to place and is too weak to move them up the stairs in her home. She has 
tripped over her oxygen tubing from the large oxygen concentrator in her house, 
therefore the extra long tubing is not safe to have in her home. She also has 
trouble getting the oxygen tanks moved from house to vehicles and around places 
if she goes shopping. Hypertension - uncontrolled - the patient's medications 
have been modified as documented in the visit note. The patient has been 
counseled to cut back on salt in diet for a no added salt diet, low fat diet, 
start an exercise program with low weight bearing exercises and higher aerobic 
activity for heart health. The patient is to check blood pressure readings as an
outpatient and either fax, call, or email the readings to the office next week 
for practitioner to review. The pt is to call for acute concerns. Increase 
losartan from 50mg daily to 50mg twice daily. Hypothyroidism - pt with chronic 
hypothyroidism, continue with current medication, will monitor pt for signs or 
symptoms of lack of adequate supplementation. Pt is to continue with current 
dose of medication unless directed otherwise. Check labs at regular intervals q 
3 months or q 6 months based on previous levels of control. Fasting labs to be 
done to be drawn by home health.

                                                            10/15/2020

 

                                        Appointment: Kimberly Souza 

WPtel:[phone]

                                        [address] [facility]

[place]                                              (15 min) Moderate 10/15/2020

 

             Patient Education: Patient Medication Summary                      

     Completed    10/15/2020

 

                          Visit Plan:               CHF - congestive heart failu

re - Pt has Chronic congestive heart 

failure - and is currently fairly well maintained on the current medications. 
Today there is no change in the treatment course. If symptoms worsen, increase 
edema or more that 2-3 pound weight gain over a week without improvement in the 
symptoms with a decrease in the sodium of the diet, then the pt is to have the 
office alerted. Dementia - Pt with slowly progressive pattern. I have discussed 
with pt and family the prognosis of this disease state and the need for the 
family to anticipate further decline with behavior changes. Continue with 
current plan of treatment.

                                                            2020

 

             Patient Education: Patient Medication Summary                      

     Completed    2020







Instructions





                                        Comment

 

                                        . Hypertension - well controlled - pop

nue with current medications, continue 

with no added salt diet.  Pt has been encouraged to exercise daily.

The pt has been advised to call the office if there are any acute concerns about
change in blood pressure readings at home.



Hypothyroidism - pt with chronic hypothyroidism, continue with current 
medication, will monitor pt for signs or symptoms of lack of adequate 
supplementation.  Pt is to continue with current dose of medication unless 
directed otherwise.  Check labs at regular intervals q 3 months or q 6 months 
based on previous levels of control.



B12 def -injection today 



 

                                        HAVE HOME HEALTH DRAW LABS WHEN THEY DO 

NEXT B12 INJECTION - CBC, CMP, TSH, FREE

T4, BNP, MAGNESIUM . Hypertension - well controlled - continue with current 
medications, continue with no added salt diet.  Pt has been encouraged to 
exercise daily.

The pt has been advised to call the office if there are any acute concerns about
change in blood pressure readings at home.



Anemia- recheck labs -continue b12 injections 



Edema - pt has been advised to elevate legs to prevent dependent edema, 
compression has been recommended to help to naturally decrease peripheral edema.
 Diuretic use has been discussed and pt has been instructed in appropriate use 
of such medication as necessary to further attempt to reduce peripheral edema.



Hypothyroidism -check levels with next labs 

 

                                        CONTINUE LOW SODIUM DIET



CONTINUE HOME HEALTH WITH [facility] B12 INJECTIONS



FOLLOW UP WITH DR OVALLE FOR PFTS AND CT SCAN

                                        . Hypertension - well controlled - pop

nue with current medications, continue 

with no added salt diet.  Pt has been encouraged to exercise daily.

The pt has been advised to call the office if there are any acute concerns about
change in blood pressure readings at home.



CHF - congestive heart failure - Pt has Chronic congestive heart failure - and 
is currently fairly well maintained on the current medications.  Today there is 
no change in the treatment course.  If symptoms worsen, increase edema or more 
that 2-3 pound weight gain over a week without improvement in the symptoms with 
a decrease in the sodium of the diet, then the pt is to have the office alerted.



Anemia- continue with B12 injections per  



 

                                        . Acute on chronic CHF - patient refuses

 cardiology -continue with oxygen at all

times- patient did not wear oxygen to appt- patient and sister verbalized 
understanding of plan. Did recommend follow up with cardiology due to recurrent 
hospitalizations for CHF and patient continues to refuse 



Anemia- repeat labs today 



HTN -controlled- no change in medications today 

 

                                        . Chronic Congestive heart failure - sym

ptoms stable on lasix  -pt has refused 

referral to Cardiology - she is not interested in any other physician and will 
not go to another specialist per her report - pt to continue with chronic oxygen
therapy.



Hypertension - well controlled - continue with current medications, continue 
with no added salt diet.  Pt has been encouraged to exercise daily.

The pt has been advised to call the office if there are any acute concerns about
change in blood pressure readings at home.



Hypothyroidism - pt with chronic hypothyroidism, continue with current 
medication, will monitor pt for signs or symptoms of lack of adequate 
supplementation.  Pt is to continue with current dose of medication unless 
directed otherwise.  Check labs at regular intervals q 3 months or q 6 months 
based on previous levels of control.



 

                                        . Chronic Congestive heart failure - sym

ptoms stable on lasix  - need to 

initiate referral to Dr. Mendoza - if not already done - pt to continue with 
chronic oxygen therapy - due to her weakness and need for easier to transport of
the oxygen from place to place, I have recommended that she needs a portable 
oxygen concentrator.  She is too weak to transport large oxygen bottles from 
place to place and is too weak to move them up the stairs in her home.  She has 
tripped over her oxygen tubing from the large oxygen concentrator in her house, 
therefore the extra long tubing is not safe to have in her home.  She also has 
trouble getting the oxygen tanks moved from house to vehicles and around places 
if she goes shopping.



Hypertension - uncontrolled - the patient's medications have been modified as 
documented in the visit note.  The patient has been counseled to cut back on 
salt in diet for a no added salt diet, low fat diet, start an exercise program 
with low weight bearing exercises and higher aerobic activity for heart health. 


The patient is to check blood pressure readings as an outpatient and either fax,
call, or email the readings to the office next week for practitioner to review.

The pt is to call for acute concerns.

Increase losartan from 50mg daily to 50mg twice daily.



Hypothyroidism - pt with chronic hypothyroidism, continue with current 
medication, will monitor pt for signs or symptoms of lack of adequate 
supplementation.  Pt is to continue with current dose of medication unless 
directed otherwise.  Check labs at regular intervals q 3 months or q 6 months 
based on previous levels of control.



Fasting labs to be done to be drawn by [facility].

 

                                        Will set up with [facility]



pt is to consider moving to assisted living



pt is to get a life alert bracelet. CHF - congestive heart failure - Pt has 
Chronic congestive heart failure - and is currently fairly well maintained on 
the current medications.  Today there is no change in the treatment course.  If 
symptoms worsen, increase edema or more that 2-3 pound weight gain over a week 
without improvement in the symptoms with a decrease in the sodium of the diet, 
then the pt is to have the office alerted.



Dementia - Pt with slowly progressive pattern.  I have discussed with pt and 
family the prognosis of this disease state and the need for the family to 
anticipate further decline with behavior changes.  Continue with current plan of
treatment.









Medical Equipment

No Medical Equipment data



Health Concerns Section

Health Concerns data not found



Goals Section

Goals data not found



Interventions Section

Interventions data not found



Health Status Evaluations/Outcomes Section

Health Status Evaluations/Outcomes data not found



Advance Directives

No Advance Directive data

## 2021-10-18 NOTE — DIAGNOSTIC IMAGING REPORT
PROCEDURE: CT thoracic and lumbar spine without contrast.



TECHNIQUE: Multiple contiguous axial images were obtained through

the thoracic and lumbar spine without the use of intravenous

contrast. Sagittal and coronal reformations were then performed.

All CT scans use one or more of the following dose optimizing

techniques: Automated exposure control, MA and/or KvP adjustment

based on a patient size and exam type, or iterative

reconstruction. 



INDICATION: Back pain. No known injury.



COMPARISON: None available.



FINDINGS:



THORACIC SPINE: Normal kyphosis of the thoracic spine. No

vertebral body compression. Diffuse osseous demineralization is

present and likely due to osteoporosis. No fracture within the

posterior elements. Small right pleural effusion is present.

Emphysema is noted in the lung apices. Otherwise, lungs are

clear.



LUMBAR SPINE: Status post vertebral augmentation of L4 superior

endplate fracture which involves two columns as there is

disruption of the posterior endplate. No acute fracture within

the lumbar spine. Diffuse osseous demineralization is compatible

with osteoporosis. The retropulsed endplate of L2 causes

mild-to-moderate spinal stenosis. At L4-L5, there is disc bulge,

facet osteoarthritis, and ligamentum flavum hypertrophy causing

moderate-to-severe spinal stenosis. No sacral insufficiency

fracture.



IMPRESSION:

1. Osteoporosis without acute compression fracture in the

thoracic or lumbar spine.

2. Prior vertebral augmentation of the two-column fracture of L2.

The retropulsed fragment causes mild-to-moderate spinal stenosis.

3. Moderate-to-severe spinal stenosis at L4-L5 due to disc bulge,

facet osteoarthritis, and ligamentum flavum hypertrophy.



Dictated by: 



  Dictated on workstation # [id number]

## 2021-10-18 NOTE — XMS REPORT
CCD document using C-CDA

                             Created on: 2021



Tierra Rizvi

External Reference #: [id number]

: 1934

Sex: Female



Demographics





                          Address                   [address]  [place]  [address]

 

                          Home Phone                [phone]

 

                          Preferred Language        Unknown

 

                          Marital Status            Unknown

 

                          [place] Affiliation     Unknown

 

                          Race                      White

 

                          Ethnic Group              Not  or 





Author





                          Author                    Tierra Mroeno

 

                          Organization              Kimberly Souza MD, [facility]

 

                          Address                   [address] [place]  [address]



 

                          Phone                     [phone]







Care Team Providers





                    Care Team Member Name Role                Phone

 

                    Kimberly Souza     PP                  Unavailable

 

                          CCM                       Unavailable







Summary Purpose

Interface Exchange



Insurance Providers





             Payer name   Policy type / Coverage type Covered party ID Effective

 Begin Date 

Effective End Date

 

             [employer] Medicare Part B Medicare Part B [id number]  Unknown      Unkno

wn

 

             [facility] Medicare Part B [id number] Unkno

wn      Unknown







Family history





Sister



                          Diagnosis                 Age At Onset

 

                          Arthritis                 Unknown

 

                          Hypertension              Unknown

 

                          Anemia                    Unknown

 

                          Diabetes mellitus Type 2  Unknown

 

                          Osteoporosis              Unknown







Son



                          Diagnosis                 Age At Onset

 

                          Myocardial infarction     Unknown

 

                          Hypertension              Unknown

 

                          Diabetes mellitus Type 2  Unknown







Father



                          Diagnosis                 Age At Onset

 

                          Cancer                    Unknown

 

                          Alcoholism                Unknown

 

                          kidney disease            Unknown







Mother



                          Diagnosis                 Age At Onset

 

                          Cancer                    Unknown







Brother



                          Diagnosis                 Age At Onset

 

                          Hypertension              Unknown

 

                          Cancer                    Unknown

 

                          Alcoholism                Unknown

 

                          Diabetes mellitus Type 2  Unknown

 

                          kidney disease            Unknown

 

                          Myocardial infarction     Unknown







Social History





                Social History Element Codes           Description     Effective

 Dates

 

                Marital status  Unknown                  2020

 

                Employment      Unknown         Retired         2020

 

                    Tobacco history     SNOMED CT: 4273175  Quit over 10 years a

go

                          2020

 

                Alcohol history SNOMED CT: 805341354 Never drinks alcohol 2020







Allergies, Adverse Reactions, Alerts





           Substance  Reaction   Codes      Entered Date Inactivated Date Status

 

           Penicillin rash,      Unknown    2020 No Inactive Date Active

 

             * OTHER REACTION - SEE ANSWER BOX Tetracycline- Rash Unknown      0

2020   No 

Inactive Date                           Active







Problems





                Condition       Codes           Effective Dates Condition Status

 

                          CHF (congestive heart failure) ICD-10: I50.9

ICD-9: 428.0              2020                Active

 

                          On supplemental oxygen therapy ICD-10: Z99.81

ICD-9: V46.2              10/15/2020                Active

 

                          Atrophy of thyroid (acquired) ICD-10: E03.4

ICD-9: 244.8              10/15/2020                Active

 

                          Essential (primary) hypertension ICD-10: I10

ICD-9: 401.1              10/15/2020                Active

 

                          Vitamin B12 deficiency (dietary) anemia ICD-10: D51.8

ICD-9: 281.1              2021                Active

 

                          Localized edema           ICD-10: R60.0

ICD-9: 782.3              06/15/2021                Active

 

                          Acute on chronic diastolic congestive heart failure IC

D-10: I50.33

ICD-9: 428.33             2021                Active

 

                          Anemia                    ICD-10: D64.9

ICD-9: 285.9              2021                Active

 

                          Dementia without behavioral disturbance, unspecified d

ementia type ICD-10: 

F03.90

ICD-9: 294.20             2020                Active







Medications





          Medication Codes     Instructions Start Date Stop Date Status    Fill 

Instructions

 

                levothyroxine 112 mcg tablet RxNorm: 701875  1 Tablet(s) Oral ev

berta day 

2021          No Stop Date        Active               

 

                    cyanocobalamin (vit B-12) 1,000 mcg/mL injection solution Rx

Norm: 054745      Take 

Milliliter(s) Injection 2021      Inactive         

 

                    cyanocobalamin (vit B-12) 1,000 mcg/mL injection solution Rx

Norm: 468558      Take 

Milliliter(s) Injection 2021      Inactive         

 

                          albuterol sulfate 2.5 mg/3 mL (0.083 %) solution for n

ebulization RxNorm: 942227

             1 Unit Dose Inhalation two times a day DX J43.1 2021   Inactive      

 

                losartan 50 mg tablet RxNorm: 256970  1 Tablet(s) Oral two times

 a day 2021          Active               

 

                          albuterol sulfate 2.5 mg/3 mL (0.083 %) solution for n

ebulization RxNorm: 958131

             1 Unit Dose Inhalation two times a day DX J43.1 2021   Inactive      

 

                          albuterol sulfate 2.5 mg/3 mL (0.083 %) solution for n

ebulization RxNorm: 960819

             1 Unit Dose Inhalation two times a day 2021   In

active      

 

                          albuterol sulfate 2.5 mg/3 mL (0.083 %) solution for n

ebulization RxNorm: 325129

             1 Unit Dose Inhalation two times a day 2021   In

active      

 

             Norvasc 5 mg tablet RxNorm: 439011 1 Tablet(s) Oral every day 2022                Active                     

 

             Norvasc 5 mg tablet RxNorm: 235934 1 Tablet(s) Oral every day 2021                Inactive                   

 

                    metoprolol succinate ER 25 mg tablet,extended release 24 hr 

RxNorm: 146505      1 

Tablet(s) Oral every day 2021      No Stop Date    Active           

 

                    Ventolin HFA 90 mcg/actuation aerosol inhaler RxNorm: 574500

      1-2 Puff(s) 

Inhalation Every 4 hrs as needed 2021      No Stop Date    Active         

  

 

             furosemide 20 mg tablet RxNorm: 225561 1 Tablet(s) Oral every day 0

2021   No 

Stop Date                 Active                     

 

                    furosemide 40 mg tablet RxNorm: 988677      1 Tablet(s) Oral

 as directed 40mg BID x 5

days then 40mg in am and 20mg afternoon thereafter 2020          

Inactive                                give qty sufficient

 

                    potassium chloride ER 10 mEq tablet,extended release RxNorm:

 722792      1 Tablet(s) 

Oral as directed 1 tab bid x 5 days then resume daily 2020          

Inactive                                qty sufficient

 

                    potassium bicarbonate-citric acid 10 mEq effervescent tablet

 RxNorm: 9791834     1 

Tablet(s) Oral every day 10/15/2020      2020      Inactive         

 

                levothyroxine 125 mcg tablet RxNorm: 643940  1 Tablet(s) Oral ev

breta day 

10/15/2020          2021          Inactive             

 

                losartan 50 mg tablet RxNorm: 896188  1 Tablet(s) Oral two times

 a day 10/15/2020

                    2021          Inactive             

 

             furosemide 40 mg tablet RxNorm: 422959 1 Tablet(s) Oral every day 1

0/15/2020   

2020                Inactive                   

 

             Zyrtec 10 mg tablet RxNorm: 0016262 1 Tablet(s) Oral every day 10/0

2020   

2021                Inactive                   

 

                levothyroxine 125 mcg tablet RxNorm: 994475  1 Tablet(s) Oral ev

berta day 

10/05/2020          10/14/2020          Inactive             

 

             Zyrtec 10 mg tablet RxNorm: 8504532 1 Tablet(s) Oral every day 10/0

2020   

10/04/2020                Inactive                   

 

                aspirin 81 mg tablet,delayed release RxNorm: 474144  1 Tablet(s)

 Oral every day 

2020          No Stop Date        Active               

 

             Vitamin C 250 mg tablet RxNorm: 019049 1 Tablet(s) Oral every day 0

2020   No 

Stop Date                 Active                     

 

                Vitamin D3 50 mcg (2,000 unit) tablet RxNorm: 180439  1 Tablet(s

) Oral every day 

2020          No Stop Date        Active               

 

                levothyroxine 88 mcg tablet RxNorm: 292981  1 Tablet(s) Oral ledy

ry day 2020

                    10/04/2020          Inactive             

 

             losartan 50 mg tablet RxNorm: 169233 1 Tablet(s) Oral every day 09/

   

10/14/2020                Inactive                   

 

             furosemide 40 mg tablet RxNorm: 941030 1 Tablet(s) Oral every day 0

2020   

10/14/2020                Inactive                   

 

                    potassium bicarbonate-citric acid 10 mEq effervescent tablet

 RxNorm: 3588316     1 

Tablet(s) Oral every day 2020      10/14/2020      Inactive         

 

          Women's Multivitamin oral RxNorm:   oral      2020           Act

maryjo     

 

          Calcium 600 oral RxNorm: 1897 oral      2020           Active   

  

 

          albuterol sulfate inhalation RxNorm: 691996 inhalation 2020     

      Active     







Medication Administered





             Medication   Codes        Instructions Start Date   Status

 

                    cyanocobalamin (vit B-12) 1,000 mcg/mL injection solution Rx

Norm: 498399      

Milliliter                2021                No longer Active

 

                    cyanocobalamin (vit B-12) 1,000 mcg/mL injection solution Rx

Norm: 930199      

Milliliter                2021                No longer Active







Immunizations





                Vaccine         Codes           Date            Status

 

                Covid-19        CVX: 207        04/10/2021       

 

                Covid-19        CVX: 207        2021       

 

                Pneumococcal (Adult) Unknown         2019       

 

                Zoster          CVX: 121        2017       







Results





          Observation Observation Code Item      Item Code Result    Date      S

ervice Location

 

          Cbc With Differential Ord2      WBC                 8.64 K/ul 20

21 Unknown

 

          Cbc With Differential Ord2      RBC                 3.76 M/ul 20

21 Unknown

 

          Cbc With Differential Ord2      HGB                 9.9 g/dl  20

21 Unknown

 

          Cbc With Differential Ord2      Neut%               61.9 %    20

21 Unknown

 

          Cbc With Differential Ord2      HCT                 33.4 %    20

21 Unknown

 

          Cbc With Differential Ord2      MCV                 88.8 fl   20

21 Unknown

 

          Cbc With Differential Ord2      Lymph%              28.1 %    20

21 Unknown

 

          Cbc With Differential Ord2      Mono%               8.0 %     20

21 Unknown

 

          Cbc With Differential Ord2      MCH                 26.3 pg   20

21 Unknown

 

          Cbc With Differential Ord2      MCHC                29.6 pg   20

21 Unknown

 

          Cbc With Differential Ord2      Eos%                1.9 %     20

21 Unknown

 

          Cbc With Differential Ord2      Baso%               0.1 %     20

21 Unknown

 

          Cbc With Differential Ord2      PLT                 332 K/ul  20

21 Unknown

 

          Cbc With Differential Ord2      RDW                 15.2 %    20

21 Unknown

 

          Cbc With Differential Ord2      Neut ABS#           5.35 K/ul 20

21 Unknown

 

          Cbc With Differential Ord2      Lymph ABS#           2.43 K/ul 

021 Unknown

 

          Cbc With Differential Ord2      Mono ABS#           0.7 K/ul  20

21 Unknown

 

          Cbc With Differential Ord2      Eos ABS#            0.2 K/ul  20

21 Unknown

 

          Cbc With Differential Ord2      Baso ABS#           0.0 K/ul  20

21 Unknown

 

          Tsh       Ord6      TSH (3rd IS)           0.21 uIU/mL 2021 Unkn

own

 

          Comp Metabolic Rgp721    NA                  141 mEq/L 2021 Unkn

own

 

          Comp Metabolic Glj941    K                   4.1 mEq/L 2021 Unkn

own

 

          Comp Metabolic Qln187    CL                  104 mEq/L 2021 Unkn

own

 

          Comp Metabolic Gus023    CO2                 28.0 mEq/L 2021 Unk

nown

 

          Comp Metabolic Gps123    ANION GAP           13        2021 Unkn

own

 

          Comp Metabolic Add653    GLUCOSE             78 mg/dL  2021 Unkn

own

 

          Comp Metabolic Kfd886    Creat               0.8 mg/dL 2021 Unkn

own

 

          Comp Metabolic Pxg172    eGFR                71 ml/min/1.73m2 20

21 Unknown

 

          Comp Metabolic Qmz185    BUN                 25 mg/dL  2021 Unkn

own

 

          Comp Metabolic Qpi144    B/C Ratio           30.9 Ratio 2021 Unk

nown

 

          Comp Metabolic Wiq158    CALCIUM             8.7 mg/dL 2021 Unkn

own

 

          Comp Metabolic Kyr203    ALK PHOS            61 U/L    2021 Unkn

own

 

          Comp Metabolic Mzd820    AST(SGOT)           17 U/L    2021 Unkn

own

 

          Comp Metabolic Hvb014    ALT(SGPT)           10 U/L    2021 Unkn

own

 

          Comp Metabolic Xbk971    BILI T              0.6 mg/dL 2021 Unkn

own

 

          Comp Metabolic Sul885    ALBUMIN             3.7 g/dL  2021 Unkn

own

 

          Comp Metabolic Ilw022    TPRO                6.1 g/dL  2021 Unkn

own

 

          Comp Metabolic Dbt548    GLOB                2.4 g/dL  2021 Unkn

own

 

          Comp Metabolic Rak700    A/G Ratio           1.6 Ratio 2021 Unkn

own

 

          Comp Metabolic Ghr899    Osmo                285 mOsmo 2021 Unkn

own

 

          B12       Lev643    B12                 >1500.00 pg/ml 2021 Unkn

own

 

          Free T4   Kjv239    FREE T4             1.23 ng/dL 2021 Unknown

 

          Tibc      Ord40     Iron                32 ug/dl  2021 Unknown

 

          Tibc      Ord40     UIBC                402 ug/dL 2021 Unknown

 

          Tibc      Ord40     TIBC                434 ug/dL 2021 Unknown

 

          Tibc      Ord40     Fe-%Sat             7.4 %     2021 Unknown

 

          Ferritin  Ord22     FERRITIN            27.6 ng/mL 2021 Unknown

 

          B12       Lyf728    B12                 187.00 pg/ml 2021 Unknow

n

 

          Comp Metabolic Leo149    NA                  139 mEq/L 2021 Unkn

own

 

          Comp Metabolic Pmj925    K                   4.1 mEq/L 2021 Unkn

own

 

          Comp Metabolic Fnl550    CL                  103 mEq/L 2021 Unkn

own

 

          Comp Metabolic Jpz395    CO2                 27.0 mEq/L 2021 Unk

nown

 

          Comp Metabolic Pod379    ANION GAP           13        2021 Unkn

own

 

          Comp Metabolic Nwm895    GLUCOSE             79 mg/dL  2021 Unkn

own

 

          Comp Metabolic Crk255    Creat               0.7 mg/dL 2021 Unkn

own

 

          Comp Metabolic Ofi933    eGFR                80 ml/min/1.73m2 20

21 Unknown

 

          Comp Metabolic Lnj014    BUN                 21 mg/dL  2021 Unkn

own

 

          Comp Metabolic Dgq559    B/C Ratio           28.8 Ratio 2021 Unk

nown

 

          Comp Metabolic Zzu607    CALCIUM             8.7 mg/dL 2021 Unkn

own

 

          Comp Metabolic Fmw630    ALK PHOS            74 U/L    2021 Unkn

own

 

          Comp Metabolic Cnq658    AST(SGOT)           15 U/L    2021 Unkn

own

 

          Comp Metabolic Xrv799    ALT(SGPT)           17 U/L    2021 Unkn

own

 

          Comp Metabolic Ilq541    BILI T              0.4 mg/dL 2021 Unkn

own

 

          Comp Metabolic Nad176    ALBUMIN             3.5 g/dL  2021 Unkn

own

 

          Comp Metabolic Vyg955    TPRO                5.9 g/dL  2021 Unkn

own

 

          Comp Metabolic Uwe136    GLOB                2.4 g/dL  2021 Unkn

own

 

          Comp Metabolic Gpd058    A/G Ratio           1.4 Ratio 2021 Unkn

own

 

          Comp Metabolic Hbz996    Osmo                279 mOsmo 2021 Unkn

own

 

          Cbc With Differential Ord2      WBC                 7.63 K/ul 20

21 Unknown

 

          Cbc With Differential Ord2      RBC                 3.38 M/ul 20

21 Unknown

 

          Cbc With Differential Ord2      HGB                 9.4 g/dl  20

21 Unknown

 

          Cbc With Differential Ord2      Neut%               52.6 %    20

21 Unknown

 

          Cbc With Differential Ord2      HCT                 31.5 %    20

21 Unknown

 

          Cbc With Differential Ord2      MCV                 93.2 fl   20

21 Unknown

 

          Cbc With Differential Ord2      Lymph%              33.8 %    20

21 Unknown

 

          Cbc With Differential Ord2      MCH                 27.8 pg   20

21 Unknown

 

          Cbc With Differential Ord2      Mono%               7.9 %     20

21 Unknown

 

          Cbc With Differential Ord2      Eos%                5.4 %     20

21 Unknown

 

          Cbc With Differential Ord2      MCHC                29.8 pg   20

21 Unknown

 

          Cbc With Differential Ord2      PLT                 329 K/ul  20

21 Unknown

 

          Cbc With Differential Ord2      Baso%               0.3 %     20

21 Unknown

 

          Cbc With Differential Ord2      Neut ABS#           4.02 K/ul 20

21 Unknown

 

          Cbc With Differential Ord2      RDW                 15.2 %    20

21 Unknown

 

          Cbc With Differential Ord2      Lymph ABS#           2.58 K/ul 

021 Unknown

 

          Cbc With Differential Ord2      Mono ABS#           0.6 K/ul  20

21 Unknown

 

          Cbc With Differential Ord2      Eos ABS#            0.4 K/ul  20

21 Unknown

 

          Cbc With Differential Ord2      Baso ABS#           0.0 K/ul  20

21 Unknown







Procedures





                    Procedure           Codes               Date

 

                    THER/PROPH/DIAG INJ SC/IM CPT-4: 20627        2021

 

                    VITAMIN B12 INJECTION 1000 mcg CPT-4:         

 

                    THER/PROPH/DIAG INJ SC/IM CPT-4: 23602        2021







Vital Signs





                          Date                      Vital

 

                2021      SpO2: 96%       SpO2: 94%       SpO2: 87%

 

                2021      Blood Pressure 1: 138/82 Code: 8480-6 BMI: 33.5 

Code: 69212-6 Heart 

Rate 1: 74 bpm      Height: 5'3" Code: 8302-2 SpO2: 98%           Temperature: 3

6.2 (C) / 97.1 

(F)                                     Weight: 189 lbs  Code: 82477-9

 

                06/15/2021      Blood Pressure 1: 134/80 Code: 8480-6 Heart Rate

 1: 77 bpm Height: 

5'3" Code: 8302-2         SpO2: 93%                 Temperature: 36.3 (C) / 97.3

 (F)

 

                2021      Blood Pressure 1: 136/88 Code: 8480-6 Heart Rate

 1: 60 bpm Height:  

Code: 8302-2        SpO2: 93%           Temperature: 36.3 (C) / 97.3 (F) Weight:

 174 lbs  Code: 

89054-4

 

                2021      Blood Pressure 1: 164/92 Code: 8480-6 BMI: 31.4 

Code: 21739-6 Heart 

Rate 1: 67 bpm      Height: 5'3" Code: 8302-2 SpO2: 94%           Temperature: 3

6.2 (C) / 97.1 

(F)                                     Weight: 177 lbs  Code: 41658-3

 

                2021      Blood Pressure 1: 134/72 Code: 8480-6 BMI: 30.8 

Code: 52445-9 Heart 

Rate 1: 74 bpm  Height: 5'3" Code: 8302-2 Respiratory Rate: 18 bpm SpO2: 95%    

   

Temperature: 36.3 (C) / 97.4 (F)        Weight: 174 lbs  Code: 12113-7

 

                10/15/2020      Blood Pressure 1: 202/94 Code: 8480-6 BMI: 30.6 

Code: 95762-5 Heart 

Rate 1: 73 bpm  Height: 5'3" Code: 8302-2 Respiratory Rate: 17 bpm SpO2: 91%    

   

Temperature: 36.8 (C) / 98.2 (F)        Weight: 173 lbs  Code: 66582-9

 

                2020      Blood Pressure 1: 144/90 Code: 8480-6 BMI: 29.4 

Code: 56816-7 Heart 

Rate 1: 72 bpm      Height: 5'3" Code: 8302-2 SpO2: 98%           Temperature: 3

6.7 (C) / 98.0 

(F)                                     Weight: 166 lbs  Code: 60768-3







Functional Status

No Functional Status data



Reason For Visit





                    Reason For Visit    Effective Dates     Notes

 

                    hypertension        2021           

 

                    hypertension        06/15/2021           

 

                    hypertension        2021           

 

                    Hospital Follow Up  2021           

 

                    shortness of breath 2021           

 

                    shortness of breath 10/15/2020           

 

                    Hospital Follow Up  2020           







Encounters





             Encounter    Performer    Location     Codes        Date

 

                                        ) [id number] EST. PATIENT, LEVEL I

Diagnosis: CHF (congestive heart failure)[ICD10: I50.9]

Diagnosis: On supplemental oxygen therapy[ICD10: Z99.81] Kimberly Souza MD, [facility]          CPT-4: 43797              2021

 

                                        [phone] EST. PATIENT, LEVEL IV

Diagnosis: Vitamin B12 deficiency (dietary) anemia[ICD10: D51.8]

Diagnosis: Essential (primary) hypertension[ICD10: I10]

Diagnosis: Atrophy of thyroid (acquired)[ICD10: E03.4] [facility]          CPT-4: 51398              2021

 

                                        [phone] EST. PATIENT, LEVEL IV

Diagnosis: Essential (primary) hypertension[ICD10: I10]

Diagnosis: Vitamin B12 deficiency (dietary) anemia[ICD10: D51.8]

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

Diagnosis: Localized edema[ICD10: R60.0] Franca mejia MD, [facility]

                          CPT-4: 38463              06/15/2021

 

                                        [phone] 61030 EST. PATIENT, LEVEL IV

Diagnosis: CHF (congestive heart failure)[ICD10: I50.9]

Diagnosis: Essential (primary) hypertension[ICD10: I10]

Diagnosis: Vitamin B12 deficiency (dietary) anemia[ICD10: D51.8] Franca Souza MD, [facility] CPT-4: 91733        2021

 

                                        [phone] 43168 EST. PATIENT, LEVEL IV

Diagnosis: Acute on chronic diastolic congestive heart failure[ICD10: I50.33]

Diagnosis: Anemia[ICD10: D64.9]

Diagnosis: Essential (primary) hypertension[ICD10: I10] Franca Souza MD, [facility]          CPT-4: 88697              2021

 

                                        [phone] 79015 EST. PATIENT, LEVEL IV

Diagnosis: Essential (primary) hypertension[ICD10: I10]

Diagnosis: CHF (congestive heart failure)[ICD10: I50.9]

Diagnosis: Atrophy of thyroid (acquired)[ICD10: E03.4] Kimberly Souza MD, [facility]          CPT-4: 82092              2021

 

                                        [phone] 90348 EST. PATIENT, LEVEL IV

Diagnosis: Essential (primary) hypertension[ICD10: I10]

Diagnosis: CHF (congestive heart failure)[ICD10: I50.9]

Diagnosis: On supplemental oxygen therapy[ICD10: Z99.81]

Diagnosis: Atrophy of thyroid (acquired)[ICD10: E03.4] Kimberly Souza MD, [facility]          CPT-4: [id number]              10/15/2020

 

                                        OFFICE  VISIT, NEW - LEVEL 3

Diagnosis: CHF (congestive heart failure)[ICD10: I50.9]

Diagnosis: Dementia without behavioral disturbance, unspecified dementia 
type[ICD10: F03.90] Lynn Souza MD, [facility] CPT-4: [id number]    







Plan of Care





             Planned Activity Notes        Codes        Status       Date

 

             Patient Education: Patient Medication Summary                      

     Completed    2021

 

                          Visit Plan:               Hypertension - well controll

ed - continue with current medications,

continue with no added salt diet. Pt has been encouraged to exercise daily. The 
pt has been advised to call the office if there are any acute concerns about 
change in blood pressure readings at home. Hypothyroidism - pt with chronic 
hypothyroidism, continue with current medication, will monitor pt for signs or 
symptoms of lack of adequate supplementation. Pt is to continue with current 
dose of medication unless directed otherwise. Check labs at regular intervals q 
3 months or q 6 months based on previous levels of control. B12 def -injection 
today

                                                            2021

 

                                        Appointment: Franca Urbina 

WPtel:[phone]

                                        [address] [facility]

[place]                                              (30 min) Complex 2021

 

             Patient Education: Patient Medication Summary                      

     Completed    2021

 

             Appointment:                            Injection    2021

 

             Patient Education: Patient Medication Summary                      

     Completed    2021

 

             Patient Education: Patient Medication Summary                      

     Completed    2021

 

             Care Plan: Cbc With Differential                           Pending 

     2021

 

             Care Plan: Comp Metabolic                           Pending      

 

             Care Plan: Tsh                           Pending      2021

 

             Care Plan: Magnesium                           Pending      

021

 

             Care Plan: Free T4                           Pending      

 

                          Visit Plan:               Hypertension - well controll

ed - continue with current medications,

continue with no added salt diet. Pt has been encouraged to exercise daily. The 
pt has been advised to call the office if there are any acute concerns about 
change in blood pressure readings at home. Anemia- recheck labs -continue b12 
injections Edema - pt has been advised to elevate legs to prevent dependent 
edema, compression has been recommended to help to naturally decrease peripheral
edema. Diuretic use has been discussed and pt has been instructed in appropriate
use of such medication as necessary to further attempt to reduce peripheral 
edema. Hypothyroidism -check levels with next labs

                                                            06/15/2021

 

                                        Appointment: Franca Urbina 

WPtel:[phone]

                                        [address] [facility]

[place]                                              (30 min) Complex 06/15/2021

 

             Patient Education: Patient Medication Summary                      

     Completed    06/15/2021

 

                                        Appointment: Franca Urbina 

WPtel:[phone]

                                        [facility]

                                              (30 min) Complex 2021

 

                                        Appointment: Kimberly Souza 

WPtel:[phone]

                                        [facility]

                                              (15 min) Moderate 04/15/2021

 

                          Visit Plan:               Hypertension - well controll

ed - continue with current medications,

continue with no added salt diet. Pt has been encouraged to exercise daily. The 
pt has been advised to call the office if there are any acute concerns about 
change in blood pressure readings at home. CHF - congestive heart failure - Pt 
has Chronic congestive heart failure - and is currently fairly well maintained 
on the current medications. Today there is no change in the treatment course. If
symptoms worsen, increase edema or more that 2-3 pound weight gain over a week 
without improvement in the symptoms with a decrease in the sodium of the diet, 
then the pt is to have the office alerted. Anemia- continue with B12 injections 
per 

                                                            2021

 

                                        Appointment: Franca Urbina 

WPtel:[phone]

                                        [facility]

                                              (30 min) Complex 2021

 

             Patient Education: Patient Medication Summary                      

     Completed    2021

 

                          Visit Plan:               Acute on chronic CHF - patie

nt refuses cardiology -continue with 

oxygen at all times- patient did not wear oxygen to appt- patient and sister 
verbalized understanding of plan. Did recommend follow up with cardiology due to
recurrent hospitalizations for CHF and patient continues to refuse Anemia- 
repeat labs today HTN -controlled- no change in medications today

                                                            2021

 

                                        Appointment: Franca Urbina 

WPtel:[phone]

                                        [facility]

                                              (30 min) Complex 2021

 

             Patient Education: Patient Medication Summary                      

     Completed    2021

 

             Patient Education: Patient Medication Summary                      

     Completed    2021

 

                          Visit Plan:               Chronic Congestive heart sheryl

lure - symptoms stable on lasix -pt has

refused referral to Cardiology - she is not interested in any other physician 
and will not go to another specialist per her report - pt to continue with 
chronic oxygen therapy. Hypertension - well controlled - continue with current 
medications, continue with no added salt diet. Pt has been encouraged to 
exercise daily. The pt has been advised to call the office if there are any 
acute concerns about change in blood pressure readings at home. Hypothyroidism -
pt with chronic hypothyroidism, continue with current medication, will monitor 
pt for signs or symptoms of lack of adequate supplementation. Pt is to continue 
with current dose of medication unless directed otherwise. Check labs at regular
intervals q 3 months or q 6 months based on previous levels of control.

                                                            2021

 

             Patient Education: Patient Medication Summary                      

     Completed    2021

 

                                        Appointment: Kimberly Souza 

WPtel:[phone]

                                        [facility]

                                              (30 min) Complex 2020

 

                          Visit Plan:               Chronic Congestive heart sheryl

walker - symptoms stable on lasix - need 

to initiate referral to Dr. Mendoza - if not already done - pt to continue with 
chronic oxygen therapy - due to her weakness and need for easier to transport of
the oxygen from place to place, I have recommended that she needs a portable 
oxygen concentrator. She is too weak to transport large oxygen bottles from 
place to place and is too weak to move them up the stairs in her home. She has 
tripped over her oxygen tubing from the large oxygen concentrator in her house, 
therefore the extra long tubing is not safe to have in her home. She also has 
trouble getting the oxygen tanks moved from house to vehicles and around places 
if she goes shopping. Hypertension - uncontrolled - the patient's medications 
have been modified as documented in the visit note. The patient has been 
counseled to cut back on salt in diet for a no added salt diet, low fat diet, 
start an exercise program with low weight bearing exercises and higher aerobic 
activity for heart health. The patient is to check blood pressure readings as an
outpatient and either fax, call, or email the readings to the office next week 
for practitioner to review. The pt is to call for acute concerns. Increase 
losartan from 50mg daily to 50mg twice daily. Hypothyroidism - pt with chronic 
hypothyroidism, continue with current medication, will monitor pt for signs or 
symptoms of lack of adequate supplementation. Pt is to continue with current 
dose of medication unless directed otherwise. Check labs at regular intervals q 
3 months or q 6 months based on previous levels of control. Fasting labs to be 
done to be drawn by [facility].

                                                            10/15/2020

 

                                        Appointment: Kimberly Souza 

WPtel:[phone]

                                        [address] [facility]

[place]                                              (15 min) Moderate 10/15/2020

 

             Patient Education: Patient Medication Summary                      

     Completed    10/15/2020

 

                          Visit Plan:               CHF - congestive heart failu

re - Pt has Chronic congestive heart 

failure - and is currently fairly well maintained on the current medications. 
Today there is no change in the treatment course. If symptoms worsen, increase 
edema or more that 2-3 pound weight gain over a week without improvement in the 
symptoms with a decrease in the sodium of the diet, then the pt is to have the 
office alerted. Dementia - Pt with slowly progressive pattern. I have discussed 
with pt and family the prognosis of this disease state and the need for the 
family to anticipate further decline with behavior changes. Continue with 
current plan of treatment.

                                                            2020

 

             Patient Education: Patient Medication Summary                      

     Completed    2020







Instructions





                                        Comment

 

                                        . Hypertension - well controlled - pop

nue with current medications, continue 

with no added salt diet.  Pt has been encouraged to exercise daily.

The pt has been advised to call the office if there are any acute concerns about
change in blood pressure readings at home.



Hypothyroidism - pt with chronic hypothyroidism, continue with current 
medication, will monitor pt for signs or symptoms of lack of adequate 
supplementation.  Pt is to continue with current dose of medication unless 
directed otherwise.  Check labs at regular intervals q 3 months or q 6 months 
based on previous levels of control.



B12 def -injection today 



 

                                        HAVE HOME HEALTH DRAW LABS WHEN THEY DO 

NEXT B12 INJECTION - CBC, CMP, TSH, FREE

T4, BNP, MAGNESIUM . Hypertension - well controlled - continue with current 
medications, continue with no added salt diet.  Pt has been encouraged to 
exercise daily.

The pt has been advised to call the office if there are any acute concerns about
change in blood pressure readings at home.



Anemia- recheck labs -continue b12 injections 



Edema - pt has been advised to elevate legs to prevent dependent edema, 
compression has been recommended to help to naturally decrease peripheral edema.
 Diuretic use has been discussed and pt has been instructed in appropriate use 
of such medication as necessary to further attempt to reduce peripheral edema.



Hypothyroidism -check levels with next labs 

 

                                        CONTINUE LOW SODIUM DIET



CONTINUE HOME HEALTH WITH [facility] B12 INJECTIONS



FOLLOW UP WITH DR OVALLE FOR PFTS AND CT SCAN

                                        . Hypertension - well controlled - pop

nue with current medications, continue 

with no added salt diet.  Pt has been encouraged to exercise daily.

The pt has been advised to call the office if there are any acute concerns about
change in blood pressure readings at home.



CHF - congestive heart failure - Pt has Chronic congestive heart failure - and 
is currently fairly well maintained on the current medications.  Today there is 
no change in the treatment course.  If symptoms worsen, increase edema or more 
that 2-3 pound weight gain over a week without improvement in the symptoms with 
a decrease in the sodium of the diet, then the pt is to have the office alerted.



Anemia- continue with B12 injections per HH 



 

                                        . Acute on chronic CHF - patient refuses

 cardiology -continue with oxygen at all

times- patient did not wear oxygen to appt- patient and sister verbalized 
understanding of plan. Did recommend follow up with cardiology due to recurrent 
hospitalizations for CHF and patient continues to refuse 



Anemia- repeat labs today 



HTN -controlled- no change in medications today 

 

                                        . Chronic Congestive heart failure - sym

ptoms stable on lasix  -pt has refused 

referral to Cardiology - she is not interested in any other physician and will 
not go to another specialist per her report - pt to continue with chronic oxygen
therapy.



Hypertension - well controlled - continue with current medications, continue 
with no added salt diet.  Pt has been encouraged to exercise daily.

The pt has been advised to call the office if there are any acute concerns about
change in blood pressure readings at home.



Hypothyroidism - pt with chronic hypothyroidism, continue with current 
medication, will monitor pt for signs or symptoms of lack of adequate 
supplementation.  Pt is to continue with current dose of medication unless 
directed otherwise.  Check labs at regular intervals q 3 months or q 6 months 
based on previous levels of control.



 

                                        . Chronic Congestive heart failure - sym

ptoms stable on lasix  - need to 

initiate referral to Dr. Mendoza - if not already done - pt to continue with 
chronic oxygen therapy - due to her weakness and need for easier to transport of
the oxygen from place to place, I have recommended that she needs a portable 
oxygen concentrator.  She is too weak to transport large oxygen bottles from 
place to place and is too weak to move them up the stairs in her home.  She has 
tripped over her oxygen tubing from the large oxygen concentrator in her house, 
therefore the extra long tubing is not safe to have in her home.  She also has 
trouble getting the oxygen tanks moved from house to vehicles and around places 
if she goes shopping.



Hypertension - uncontrolled - the patient's medications have been modified as 
documented in the visit note.  The patient has been counseled to cut back on 
salt in diet for a no added salt diet, low fat diet, start an exercise program 
with low weight bearing exercises and higher aerobic activity for heart health. 


The patient is to check blood pressure readings as an outpatient and either fax,
call, or email the readings to the office next week for practitioner to review.

The pt is to call for acute concerns.

Increase losartan from 50mg daily to 50mg twice daily.



Hypothyroidism - pt with chronic hypothyroidism, continue with current 
medication, will monitor pt for signs or symptoms of lack of adequate 
supplementation.  Pt is to continue with current dose of medication unless 
directed otherwise.  Check labs at regular intervals q 3 months or q 6 months 
based on previous levels of control.



Fasting labs to be done to be drawn by [facility].

 

                                        Will set up with [facility]



pt is to consider moving to assisted living



pt is to get a life alert bracelet. CHF - congestive heart failure - Pt has 
Chronic congestive heart failure - and is currently fairly well maintained on 
the current medications.  Today there is no change in the treatment course.  If 
symptoms worsen, increase edema or more that 2-3 pound weight gain over a week 
without improvement in the symptoms with a decrease in the sodium of the diet, 
then the pt is to have the office alerted.



Dementia - Pt with slowly progressive pattern.  I have discussed with pt and 
family the prognosis of this disease state and the need for the family to 
anticipate further decline with behavior changes.  Continue with current plan of
treatment.









Medical Equipment

No Medical Equipment data



Health Concerns Section

Health Concerns data not found



Goals Section

Goals data not found



Interventions Section

Interventions data not found



Health Status Evaluations/Outcomes Section

Health Status Evaluations/Outcomes data not found



Advance Directives

No Advance Directive data

## 2021-10-18 NOTE — ED BACK PAIN
General


Chief Complaint:  Back Problems


Stated Complaint:  BACK PAIN


Source of Information:  Patient


Exam Limitations:  No Limitations





History of Present Illness


Date Seen by Provider:  Oct 18, 2021


Time Seen by Provider:  15:00


Initial Comments


To ER with reports of mid back pain and low back pain since Thursday of last 

week.  No fall or known injury.  She does have a history about 1.5 years ago of 

kyphoplasty by Dr. Morrell from neurosurgery at [facility] in [place].  Has 

been taking Tylenol without much relief.  The pain does not radiate.  No fever 

or chills.  No abdominal pain.  She has been using some topical lidocaine 

patches to the low back over the sacrum.  No loss of bowel or bladder control no

loss of sensation of genitals.  She states that she just wants to get her pain 

under control and go home.  She is agreeable to imaging.


Location:  Lumbar Spine


Timing/Duration:  1-2 Days


Severity:  Moderate


Method of Injury:  Unknown


Associated Symptoms:  lower back pain





Allergies and Home Medications


Allergies


Coded Allergies:  


     Tetracyclines (Verified  Allergy, Unknown, 20)


Uncoded Allergies:  


     PCN (Allergy, Unknown, 20)





Patient Home Medication List


Home Medication List Reviewed:  Yes


Albuterol Sulfate (Albuterol Sulfate) 1.25 Mg/3 Ml Vial.neb, 1.25 MG INH Q4H PRN

for SHORTNESS OF BREATH, (Reported)


   Entered as Reported by: ORLY HAZEL on 20 110


Albuterol Sulfate (Ventolin Hfa) 18 Gm Hfa.aer.ad, 1 PUFF IH QID


   Prescribed by: ELIJAH OSUZA on 21 0912


Ascorbic Acid/Ascorbate Sodium (Vitamin C 250 mg Tablet Chew) 250 Mg Tab.chew, 

250 MG PO DAILY, (Reported)


   Entered as Reported by: ORLY HAZEL on 20 1103


Aspirin (Aspirin) 81 Mg Tab.chew, 81 MG PO DAILY, (Reported)


   Entered as Reported by: ORLY HAZEL on 20 1103


Calcium Phosphate Trib/Vit D3 (Calcium Gummies) 1 Each Tab.chew, 1 EACH PO 

DAILY, (Reported)


   Entered as Reported by: ORLY HAZEL on 20 1103


Cefdinir (Cefdinir) 300 Mg Capsule, 300 MG PO BID


   Prescribed by: ELIJAH SOUZA on 21 0912


Cetirizine HCl (Cetirizine HCl) 10 Mg Tablet, 10 MG PO DAILY, (Reported)


   Entered as Reported by: ORLY HAZEL on 20 1013


Cholecalciferol (Vitamin D3) (Vitamin D3) 125 Mcg Tablet, 125 MCG PO DAILY, 

(Reported)


   Entered as Reported by: ORLY HAZEL on 20 1103


Folic Acid/Multivit-Minerals (Women's Multivitamin Gummies) 200 Mcg Tab.chew, 

200 MCG PO DAILY, (Reported)


   Entered as Reported by: ORLY HAZEL on 20 1103


Furosemide (Lasix) 20 Mg Tablet, 20 MG PO DAILY


   Prescribed by: ELIJAH SOUZA on 21 09


Levothyroxine Sodium (Levothyroxine Sodium) 125 Mcg Tablet, 125 MCG PO DAILY, 

(Reported)


   Entered as Reported by: ORLY HAZEL on 20 1013


Melatonin (Melatonin) 5 Mg Tablet, 5 MG PO HS PRN for SLEEP, (Reported)


   Entered as Reported by: ORLY HAZEL on 20 1103


Metoprolol Succinate (Metoprolol Succinate) 25 Mg Tab.er.24h, 25 MG PO DAILY


   Prescribed by: ELIJAH SOUZA on 21 09


Potassium Bicarbonate/Cit AC (Effer-K 10 Meq Tablet Eff) 10 Meq Tablet.eff, 10 

MEQ PO DAILY, (Reported)


   Entered as Reported by: KEIRA TINSLEY on 21 1534





Review of Systems


Constitutional:  see HPI


EENTM:  see HPI


Respiratory:  no symptoms reported


Cardiovascular:  no symptoms reported


Genitourinary:  no symptoms reported


Musculoskeletal:  see HPI, back pain


Skin:  no symptoms reported


Psychiatric/Neurological:  No Symptoms Reported





Past Medical-Social-Family Hx


Immunizations Up To Date


Tetanus Booster (TDap):  Unknown





Seasonal Allergies


Seasonal Allergies:  No





Past Medical History


Surgeries:  Yes (THORACENTESIS)


Neurological


Respiratory:  Yes (PLEURAL EFFUSIONS WITH THORACENTESIS)


COPD


Cardiac:  Yes (CHF)


Atrial Fibrillation, Chronic Edema/Swelling, Hypertension


Neurological:  No


Reproductive Disorders:  No


GYN History:  Menopausal


Sexually Transmitted Disease:  No


HIV/AIDS:  No


Genitourinary:  No


Gastrointestinal:  No


Musculoskeletal:  No


Arthritis


Endocrine:  Yes


Hypothyroidsim


HEENT:  Yes


Macular Degeneration


Loss of Vision:  Denies


Hearing Impairment:  Hard of Hearing


Cancer:  No


Psychosocial:  No


Integumentary:  No


Blood Disorders:  No





Family Medical History


Heart Disease, Cancer, Diabetes, Hypertension





Physical Exam


Vital Signs





Vital Signs - First Documented








 10/18/21





 15:07


 


Temp 36.0


 


Pulse 78


 


Resp 20


 


B/P (MAP) 201/106 (137)


 


Pulse Ox 97


 


O2 Delivery Room Air





Capillary Refill :


Height, Weight, BMI


Height: '"


Weight: lbs. oz. kg; 33.59 BMI


Method:


General Appearance:  No Apparent Distress, WD/WN


Neck:  Full Range of Motion, Normal Inspection


Respiratory:  No Accessory Muscle Use, No Respiratory Distress


Gastrointestinal:  Normal Bowel Sounds, Non Tender, Soft


Back:  Normal Inspection, Vertebral Tenderness


Extremity:  Normal Capillary Refill, Normal Inspection


Neurologic/Psychiatric:  Alert, Oriented x3


Skin:  Normal Color, Warm/Dry





Progress/Results/Core Measures


Results/Orders


Lab Results





Laboratory Tests








Test


 10/18/21


16:20 Range/Units


 


 


Urine Color YELLOW   


 


Urine Clarity SL CLOUDY   


 


Urine pH 6.0  5-9  


 


Urine Specific Gravity 1.025 H 1.016-1.022  


 


Urine Protein NEGATIVE  NEGATIVE  


 


Urine Glucose (UA) NEGATIVE  NEGATIVE  


 


Urine Ketones NEGATIVE  NEGATIVE  


 


Urine Nitrite NEGATIVE  NEGATIVE  


 


Urine Bilirubin NEGATIVE  NEGATIVE  


 


Urine Urobilinogen 1.0  < = 1.0  MG/DL


 


Urine Leukocyte Esterase NEGATIVE  NEGATIVE  


 


Urine RBC (Auto) NEGATIVE  NEGATIVE  


 


Urine RBC NONE   /HPF


 


Urine WBC NONE   /HPF


 


Urine Squamous Epithelial


Cells 2-5 


  /HPF





 


Urine Crystals NONE   /LPF


 


Urine Bacteria MODERATE H  /HPF


 


Urine Casts NONE   /LPF


 


Urine Mucus SMALL H  /LPF


 


Urine Culture Indicated NO   








My Orders





Orders - RASHEL ANN


Ct Thoracic/Lumbar Spine Wo (10/18/21 15:14)


Ketorolac Injection (Toradol Injection) (10/18/21 15:15)


Hydrocodone/Apap 5/325 Tablet (Lortab 5 (10/18/21 15:15)


Ua Culture If Indicated (10/18/21 16:12)


Clonidine  Tablet (Catapres  Tablet) (10/18/21 16:30)





Medications Given in ED





Current Medications








 Medications  Dose


 Ordered  Sig/Hussein


 Route  Start Time


 Stop Time Status Last Admin


Dose Admin


 


 Acetaminophen/


 Hydrocodone Bitart  1 ea  ONCE  ONCE


 PO  10/18/21 15:15


 10/18/21 15:19 DC 10/18/21 15:25


1 EA


 


 Clonidine HCl  0.1 mg  ONCE  ONCE


 PO  10/18/21 16:30


 10/18/21 16:31 DC 10/18/21 16:26


0.1 MG


 


 Ketorolac


 Tromethamine  30 mg  ONCE  ONCE


 IM  10/18/21 15:15


 10/18/21 15:19 DC 10/18/21 15:25


30 MG








Vital Signs/I&O











 10/18/21





 15:07


 


Temp 36.0


 


Pulse 78


 


Resp 20


 


B/P (MAP) 201/106 (137)


 


Pulse Ox 97


 


O2 Delivery Room Air











Departure


Communication (Admissions)


NAME:   LOIS SINGH


MED REC#:   [id number]


ACCOUNT#:   [id number]


PT STATUS:   REG ER


:   1934


PHYSICIAN:   RASHEL ANN


ADMIT DATE:   10/18/21/ER


                                   ***Draft***


Date of Exam:10/18/21





CT THORACIC/LUMBAR SPINE WO








PROCEDURE: CT thoracic and lumbar spine without contrast.





TECHNIQUE: Multiple contiguous axial images were obtained through


the thoracic and lumbar spine without the use of intravenous


contrast. Sagittal and coronal reformations were then performed.


All CT scans use one or more of the following dose optimizing


techniques: Automated exposure control, MA and/or KvP adjustment


based on a patient size and exam type, or iterative


reconstruction. 





INDICATION: Back pain. No known injury.





COMPARISON: None available.





FINDINGS:





THORACIC SPINE: Normal kyphosis of the thoracic spine. No


vertebral body compression. Diffuse osseous demineralization is


present and likely due to osteoporosis. No fracture within the


posterior elements. Small right pleural effusion is present.


Emphysema is noted in the lung apices. Otherwise, lungs are


clear.





LUMBAR SPINE: Status post vertebral augmentation of L4 superior


endplate fracture which involves two columns as there is


disruption of the posterior endplate. No acute fracture within


the lumbar spine. Diffuse osseous demineralization is compatible


with osteoporosis. The retropulsed endplate of L2 causes


mild-to-moderate spinal stenosis. At L4-L5, there is disc bulge,


facet osteoarthritis, and ligamentum flavum hypertrophy causing


moderate-to-severe spinal stenosis. No sacral insufficiency


fracture.





IMPRESSION:


1. Osteoporosis without acute compression fracture in the


thoracic or lumbar spine.


2. Prior vertebral augmentation of the two-column fracture of L2.


The retropulsed fragment causes mild-to-moderate spinal stenosis.


3. Moderate-to-severe spinal stenosis at L4-L5 due to disc bulge,


facet osteoarthritis, and ligamentum flavum hypertrophy.





  Dictated on workstation # [id number]








Dict:   10/18/21 1602


Trans:   10/18/21 1609


 [id number]





Interpreted by:     ALEX LYON MD


Electronically signed by:





Impression





   Primary Impression:  


   Back pain


Disposition:  01 HOME, SELF-CARE


Condition:  Stable





Departure-Patient Inst.


Decision time for Depature:  16:12


Referrals:  


ELIJAH SOUZA MD (PCP/Family)


Primary Care Physician


Patient Instructions:  Low Back Pain  (DC)





Add. Discharge Instructions:  


1.  Take the pain medication as directed.  Follow-up with Dr. Souza next 

week.  The pain medication can be constipating so take this as infrequently as 

possible and increase your water intake and take the stool softener with it.





All discharge instructions reviewed with patient and/or family. Voiced 

understanding.


Scripts


Docusate Sodium (Colace) 100 Mg Capsule


100 MG PO DAILY, #10 CAP


   Prov: RASHEL ANN         10/18/21 


Hydrocodone/Acetaminophen (Hydrocodone-Acetamin 5-325 mg) 1 Each Tablet


1 TAB PO Q4H PRN for PAIN-MODERATE (5-7), #10 TAB


   Prov: RASHEL ANN         10/18/21











RASHEL ANN             Oct 18, 2021 15:23

## 2021-10-18 NOTE — XMS REPORT
CCD document using C-CDA

                             Created on: 10/04/2021



Tierra Rizvi

External Reference #: [id number]

: 1934

Sex: Female



Demographics





                          Address                   [address]  [place]  [address]

 

                          Home Phone                [phone]

 

                          Preferred Language        Unknown

 

                          Marital Status            Unknown

 

                          Taoist Affiliation     Unknown

 

                          Race                      White

 

                          Ethnic Group              Not  or 





Author





                          Author                    Tierra Moreno

 

                          Organization              Kimberly Souza MD, [facility]

 

                          Address                   [address] [place]  [address]



 

                          Phone                     [phone]







Care Team Providers





                    Care Team Member Name Role                Phone

 

                    Kimberly Souza     PP                  Unavailable

 

                          CCM                       Unavailable







Summary Purpose

Interface Exchange



Insurance Providers





             Payer name   Policy type / Coverage type Covered party ID Effective

 Begin Date 

Effective End Date

 

             [employer] Medicare Part B Medicare Part B [id number]  Unknown      Unkno

wn

 

             [facility] Medicare Part B [id number] Unkno

wn      Unknown







Family history





Sister



                          Diagnosis                 Age At Onset

 

                          Arthritis                 Unknown

 

                          Hypertension              Unknown

 

                          Anemia                    Unknown

 

                          Diabetes mellitus Type 2  Unknown

 

                          Osteoporosis              Unknown







Son



                          Diagnosis                 Age At Onset

 

                          Myocardial infarction     Unknown

 

                          Hypertension              Unknown

 

                          Diabetes mellitus Type 2  Unknown







Father



                          Diagnosis                 Age At Onset

 

                          Cancer                    Unknown

 

                          Alcoholism                Unknown

 

                          kidney disease            Unknown







Mother



                          Diagnosis                 Age At Onset

 

                          Cancer                    Unknown







Brother



                          Diagnosis                 Age At Onset

 

                          Hypertension              Unknown

 

                          Cancer                    Unknown

 

                          Alcoholism                Unknown

 

                          Diabetes mellitus Type 2  Unknown

 

                          kidney disease            Unknown

 

                          Myocardial infarction     Unknown







Social History





                Social History Element Codes           Description     Effective

 Dates

 

                Marital status  Unknown                  2020

 

                Employment      Unknown         Retired         2020

 

                    Tobacco history     SNOMED CT: 7298653  Quit over 10 years a

go

                          2020

 

                Alcohol history SNOMED CT: 354626461 Never drinks alcohol 2020







Allergies, Adverse Reactions, Alerts





           Substance  Reaction   Codes      Entered Date Inactivated Date Status

 

           Penicillin rash,      Unknown    2020 No Inactive Date Active

 

             * OTHER REACTION - SEE ANSWER BOX Tetracycline- Rash Unknown      0

2020   No 

Inactive Date                           Active







Problems





                Condition       Codes           Effective Dates Condition Status

 

                          CHF (congestive heart failure) ICD-10: I50.9

ICD-9: 428.0              2020                Active

 

                          On supplemental oxygen therapy ICD-10: Z99.81

ICD-9: V46.2              10/15/2020                Active

 

                          Atrophy of thyroid (acquired) ICD-10: E03.4

ICD-9: 244.8              10/15/2020                Active

 

                          Essential (primary) hypertension ICD-10: I10

ICD-9: 401.1              10/15/2020                Active

 

                          Vitamin B12 deficiency (dietary) anemia ICD-10: D51.8

ICD-9: 281.1              2021                Active

 

                          Localized edema           ICD-10: R60.0

ICD-9: 782.3              06/15/2021                Active

 

                          Acute on chronic diastolic congestive heart failure IC

D-10: I50.33

ICD-9: 428.33             2021                Active

 

                          Anemia                    ICD-10: D64.9

ICD-9: 285.9              2021                Active

 

                          Dementia without behavioral disturbance, unspecified d

ementia type ICD-10: 

F03.90

ICD-9: 294.20             2020                Active







Medications





          Medication Codes     Instructions Start Date Stop Date Status    Fill 

Instructions

 

                levothyroxine 112 mcg tablet RxNorm: 445405  1 Tablet(s) Oral ev

berta day 

2021          No Stop Date        Active               

 

                    cyanocobalamin (vit B-12) 1,000 mcg/mL injection solution Rx

Norm: 339752      Take 

Milliliter(s) Injection 2021      Inactive         

 

                    cyanocobalamin (vit B-12) 1,000 mcg/mL injection solution Rx

Norm: 163120      Take 

Milliliter(s) Injection 2021      Inactive         

 

                          albuterol sulfate 2.5 mg/3 mL (0.083 %) solution for n

ebulization RxNorm: 704956

             1 Unit Dose Inhalation two times a day DX J43.1 2021   Inactive      

 

                losartan 50 mg tablet RxNorm: 967117  1 Tablet(s) Oral two times

 a day 2021          Active               

 

                          albuterol sulfate 2.5 mg/3 mL (0.083 %) solution for n

ebulization RxNorm: 343490

             1 Unit Dose Inhalation two times a day DX J43.1 2021   Inactive      

 

                          albuterol sulfate 2.5 mg/3 mL (0.083 %) solution for n

ebulization RxNorm: 563270

             1 Unit Dose Inhalation two times a day 2021   In

active      

 

                          albuterol sulfate 2.5 mg/3 mL (0.083 %) solution for n

ebulization RxNorm: 945008

             1 Unit Dose Inhalation two times a day 2021   In

active      

 

             Norvasc 5 mg tablet RxNorm: 002813 1 Tablet(s) Oral every day 2022                Active                     

 

             Norvasc 5 mg tablet RxNorm: 218481 1 Tablet(s) Oral every day 2021                Inactive                   

 

                    metoprolol succinate ER 25 mg tablet,extended release 24 hr 

RxNorm: 072847      1 

Tablet(s) Oral every day 2021      No Stop Date    Active           

 

                    Ventolin HFA 90 mcg/actuation aerosol inhaler RxNorm: 368133

      1-2 Puff(s) 

Inhalation Every 4 hrs as needed 2021      No Stop Date    Active         

  

 

             furosemide 20 mg tablet RxNorm: 220976 1 Tablet(s) Oral every day 0

2021   No 

Stop Date                 Active                     

 

                    furosemide 40 mg tablet RxNorm: 622588      1 Tablet(s) Oral

 as directed 40mg BID x 5

days then 40mg in am and 20mg afternoon thereafter 2020          

Inactive                                give qty sufficient

 

                    potassium chloride ER 10 mEq tablet,extended release RxNorm:

 956496      1 Tablet(s) 

Oral as directed 1 tab bid x 5 days then resume daily 2020          

Inactive                                qty sufficient

 

                    potassium bicarbonate-citric acid 10 mEq effervescent tablet

 RxNorm: 8469521     1 

Tablet(s) Oral every day 10/15/2020      2020      Inactive         

 

                levothyroxine 125 mcg tablet RxNorm: 350343  1 Tablet(s) Oral ev

berta day 

10/15/2020          2021          Inactive             

 

                losartan 50 mg tablet RxNorm: 599934  1 Tablet(s) Oral two times

 a day 10/15/2020

                    2021          Inactive             

 

             furosemide 40 mg tablet RxNorm: 209469 1 Tablet(s) Oral every day 1

0/15/2020   

2020                Inactive                   

 

             Zyrtec 10 mg tablet RxNorm: 6861858 1 Tablet(s) Oral every day 10/0

2020   

2021                Inactive                   

 

                levothyroxine 125 mcg tablet RxNorm: 796325  1 Tablet(s) Oral ev

berta day 

10/05/2020          10/14/2020          Inactive             

 

             Zyrtec 10 mg tablet RxNorm: 0131348 1 Tablet(s) Oral every day 10/0

2020   

10/04/2020                Inactive                   

 

                aspirin 81 mg tablet,delayed release RxNorm: 769776  1 Tablet(s)

 Oral every day 

2020          No Stop Date        Active               

 

             Vitamin C 250 mg tablet RxNorm: 092423 1 Tablet(s) Oral every day 0

2020   No 

Stop Date                 Active                     

 

                Vitamin D3 50 mcg (2,000 unit) tablet RxNorm: 758297  1 Tablet(s

) Oral every day 

2020          No Stop Date        Active               

 

                levothyroxine 88 mcg tablet RxNorm: 710766  1 Tablet(s) Oral ledy

ry day 2020

                    10/04/2020          Inactive             

 

             losartan 50 mg tablet RxNorm: 380675 1 Tablet(s) Oral every day 09/

   

10/14/2020                Inactive                   

 

             furosemide 40 mg tablet RxNorm: 235866 1 Tablet(s) Oral every day 0

2020   

10/14/2020                Inactive                   

 

                    potassium bicarbonate-citric acid 10 mEq effervescent tablet

 RxNorm: 2403256     1 

Tablet(s) Oral every day 2020      10/14/2020      Inactive         

 

          Women's Multivitamin oral RxNorm:   oral      2020           Act

maryjo     

 

          Calcium 600 oral RxNorm: 1897 oral      2020           Active   

  

 

          albuterol sulfate inhalation RxNorm: 236230 inhalation 2020     

      Active     







Medication Administered





             Medication   Codes        Instructions Start Date   Status

 

                    cyanocobalamin (vit B-12) 1,000 mcg/mL injection solution Rx

Norm: 815535      

Milliliter                2021                No longer Active

 

                    cyanocobalamin (vit B-12) 1,000 mcg/mL injection solution Rx

Norm: 644468      

Milliliter                2021                No longer Active







Immunizations





                Vaccine         Codes           Date            Status

 

                Covid-19        CVX: 207        04/10/2021       

 

                Covid-19        CVX: 207        2021       

 

                Pneumococcal (Adult) Unknown         2019       

 

                Zoster          CVX: 121        2017       







Results





          Observation Observation Code Item      Item Code Result    Date      S

ervice Location

 

          Cbc With Differential Ord2      WBC                 8.64 K/ul 20

21 Unknown

 

          Cbc With Differential Ord2      RBC                 3.76 M/ul 20

21 Unknown

 

          Cbc With Differential Ord2      HGB                 9.9 g/dl  20

21 Unknown

 

          Cbc With Differential Ord2      Neut%               61.9 %    20

21 Unknown

 

          Cbc With Differential Ord2      HCT                 33.4 %    20

21 Unknown

 

          Cbc With Differential Ord2      MCV                 88.8 fl   20

21 Unknown

 

          Cbc With Differential Ord2      Lymph%              28.1 %    20

21 Unknown

 

          Cbc With Differential Ord2      Mono%               8.0 %     20

21 Unknown

 

          Cbc With Differential Ord2      MCH                 26.3 pg   20

21 Unknown

 

          Cbc With Differential Ord2      MCHC                29.6 pg   20

21 Unknown

 

          Cbc With Differential Ord2      Eos%                1.9 %     20

21 Unknown

 

          Cbc With Differential Ord2      PLT                 332 K/ul  20

21 Unknown

 

          Cbc With Differential Ord2      Baso%               0.1 %     20

21 Unknown

 

          Cbc With Differential Ord2      RDW                 15.2 %    20

21 Unknown

 

          Cbc With Differential Ord2      Neut ABS#           5.35 K/ul 20

21 Unknown

 

          Cbc With Differential Ord2      Lymph ABS#           2.43 K/ul 

021 Unknown

 

          Cbc With Differential Ord2      Mono ABS#           0.7 K/ul  20

21 Unknown

 

          Cbc With Differential Ord2      Eos ABS#            0.2 K/ul  20

21 Unknown

 

          Cbc With Differential Ord2      Baso ABS#           0.0 K/ul  20

21 Unknown

 

          Tsh       Ord6      TSH (3rd IS)           0.21 uIU/mL 2021 Unkn

own

 

          Comp Metabolic Jwk048    NA                  141 mEq/L 2021 Unkn

own

 

          Comp Metabolic Yzj376    K                   4.1 mEq/L 2021 Unkn

own

 

          Comp Metabolic Xop287    CL                  104 mEq/L 2021 Unkn

own

 

          Comp Metabolic Uwj665    CO2                 28.0 mEq/L 2021 Unk

nown

 

          Comp Metabolic Unz066    ANION GAP           13        2021 Unkn

own

 

          Comp Metabolic Jbj635    GLUCOSE             78 mg/dL  2021 Unkn

own

 

          Comp Metabolic Boc401    Creat               0.8 mg/dL 2021 Unkn

own

 

          Comp Metabolic Jvv314    eGFR                71 ml/min/1.73m2 20

21 Unknown

 

          Comp Metabolic Fhp642    BUN                 25 mg/dL  2021 Unkn

own

 

          Comp Metabolic Dhq921    B/C Ratio           30.9 Ratio 2021 Unk

nown

 

          Comp Metabolic Ale543    CALCIUM             8.7 mg/dL 2021 Unkn

own

 

          Comp Metabolic Xgl794    ALK PHOS            61 U/L    2021 Unkn

own

 

          Comp Metabolic Vdg328    AST(SGOT)           17 U/L    2021 Unkn

own

 

          Comp Metabolic Uww636    ALT(SGPT)           10 U/L    2021 Unkn

own

 

          Comp Metabolic Hvs568    BILI T              0.6 mg/dL 2021 Unkn

own

 

          Comp Metabolic Xrq016    ALBUMIN             3.7 g/dL  2021 Unkn

own

 

          Comp Metabolic Xqv483    TPRO                6.1 g/dL  2021 Unkn

own

 

          Comp Metabolic Fun754    GLOB                2.4 g/dL  2021 Unkn

own

 

          Comp Metabolic Urx430    A/G Ratio           1.6 Ratio 2021 Unkn

own

 

          Comp Metabolic Hjr145    Osmo                285 mOsmo 2021 Unkn

own

 

          B12       Qvu793    B12                 >1500.00 pg/ml 2021 Unkn

own

 

          Free T4   Uri567    FREE T4             1.23 ng/dL 2021 Unknown

 

          Tibc      Ord40     Iron                32 ug/dl  2021 Unknown

 

          Tibc      Ord40     UIBC                402 ug/dL 2021 Unknown

 

          Tibc      Ord40     TIBC                434 ug/dL 2021 Unknown

 

          Tibc      Ord40     Fe-%Sat             7.4 %     2021 Unknown

 

          Ferritin  Ord22     FERRITIN            27.6 ng/mL 2021 Unknown

 

          B12       Crv698    B12                 187.00 pg/ml 2021 Unknow

n

 

          Comp Metabolic Uoc263    NA                  139 mEq/L 2021 Unkn

own

 

          Comp Metabolic Dcg490    K                   4.1 mEq/L 2021 Unkn

own

 

          Comp Metabolic Kom927    CL                  103 mEq/L 2021 Unkn

own

 

          Comp Metabolic Mli172    CO2                 27.0 mEq/L 2021 Unk

nown

 

          Comp Metabolic Geg087    ANION GAP           13        2021 Unkn

own

 

          Comp Metabolic Wwj018    GLUCOSE             79 mg/dL  2021 Unkn

own

 

          Comp Metabolic Abl046    Creat               0.7 mg/dL 2021 Unkn

own

 

          Comp Metabolic Kvw402    eGFR                80 ml/min/1.73m2 20

21 Unknown

 

          Comp Metabolic Bmd179    BUN                 21 mg/dL  2021 Unkn

own

 

          Comp Metabolic Qpu191    B/C Ratio           28.8 Ratio 2021 Unk

nown

 

          Comp Metabolic Yhg765    CALCIUM             8.7 mg/dL 2021 Unkn

own

 

          Comp Metabolic Btu363    ALK PHOS            74 U/L    2021 Unkn

own

 

          Comp Metabolic Bwl663    AST(SGOT)           15 U/L    2021 Unkn

own

 

          Comp Metabolic Ero650    ALT(SGPT)           17 U/L    2021 Unkn

own

 

          Comp Metabolic Qfd094    BILI T              0.4 mg/dL 2021 Unkn

own

 

          Comp Metabolic Qbq588    ALBUMIN             3.5 g/dL  2021 Unkn

own

 

          Comp Metabolic Qir925    TPRO                5.9 g/dL  2021 Unkn

own

 

          Comp Metabolic Vho045    GLOB                2.4 g/dL  2021 Unkn

own

 

          Comp Metabolic Nht761    A/G Ratio           1.4 Ratio 2021 Unkn

own

 

          Comp Metabolic Hmi512    Osmo                279 mOsmo 2021 Unkn

own

 

          Cbc With Differential Ord2      WBC                 7.63 K/ul 20

21 Unknown

 

          Cbc With Differential Ord2      RBC                 3.38 M/ul 20

21 Unknown

 

          Cbc With Differential Ord2      HGB                 9.4 g/dl  20

21 Unknown

 

          Cbc With Differential Ord2      Neut%               52.6 %    20

21 Unknown

 

          Cbc With Differential Ord2      HCT                 31.5 %    20

21 Unknown

 

          Cbc With Differential Ord2      MCV                 93.2 fl   20

21 Unknown

 

          Cbc With Differential Ord2      Lymph%              33.8 %    20

21 Unknown

 

          Cbc With Differential Ord2      MCH                 27.8 pg   20

21 Unknown

 

          Cbc With Differential Ord2      Mono%               7.9 %     20

21 Unknown

 

          Cbc With Differential Ord2      MCHC                29.8 pg   20

21 Unknown

 

          Cbc With Differential Ord2      Eos%                5.4 %     20

21 Unknown

 

          Cbc With Differential Ord2      Baso%               0.3 %     20

21 Unknown

 

          Cbc With Differential Ord2      PLT                 329 K/ul  20

21 Unknown

 

          Cbc With Differential Ord2      Neut ABS#           4.02 K/ul 20

21 Unknown

 

          Cbc With Differential Ord2      RDW                 15.2 %    20

21 Unknown

 

          Cbc With Differential Ord2      Lymph ABS#           2.58 K/ul 

021 Unknown

 

          Cbc With Differential Ord2      Mono ABS#           0.6 K/ul  20

21 Unknown

 

          Cbc With Differential Ord2      Eos ABS#            0.4 K/ul  20

21 Unknown

 

          Cbc With Differential Ord2      Baso ABS#           0.0 K/ul  20

21 Unknown







Procedures





                    Procedure           Codes               Date

 

                    THER/PROPH/DIAG INJ SC/IM CPT-4: 56886        2021

 

                    VITAMIN B12 INJECTION 1000 mcg CPT-4:         

 

                    THER/PROPH/DIAG INJ SC/IM CPT-4: 62554        2021







Vital Signs





                          Date                      Vital

 

                2021      SpO2: 96%       SpO2: 87%       SpO2: 94%

 

                2021      Blood Pressure 1: 138/82 Code: 8480-6 BMI: 33.5 

Code: 48309-4 Heart 

Rate 1: 74 bpm      Height: 5'3" Code: 8302-2 SpO2: 98%           Temperature: 3

6.2 (C) / 97.1 

(F)                                     Weight: 189 lbs  Code: 54967-1

 

                06/15/2021      Blood Pressure 1: 134/80 Code: 8480-6 Heart Rate

 1: 77 bpm Height: 

5'3" Code: 8302-2         SpO2: 93%                 Temperature: 36.3 (C) / 97.3

 (F)

 

                2021      Blood Pressure 1: 136/88 Code: 8480-6 Heart Rate

 1: 60 bpm Height:  

Code: 8302-2        SpO2: 93%           Temperature: 36.3 (C) / 97.3 (F) Weight:

 174 lbs  Code: 

38465-1

 

                2021      Blood Pressure 1: 164/92 Code: 8480-6 BMI: 31.4 

Code: 19498-3 Heart 

Rate 1: 67 bpm      Height: 5'3" Code: 8302-2 SpO2: 94%           Temperature: 3

6.2 (C) / 97.1 

(F)                                     Weight: 177 lbs  Code: 20093-8

 

                2021      Blood Pressure 1: 134/72 Code: 8480-6 BMI: 30.8 

Code: 35749-5 Heart 

Rate 1: 74 bpm  Height: 5'3" Code: 8302-2 Respiratory Rate: 18 bpm SpO2: 95%    

   

Temperature: 36.3 (C) / 97.4 (F)        Weight: 174 lbs  Code: 06021-9

 

                10/15/2020      Blood Pressure 1: 202/94 Code: 8480-6 BMI: 30.6 

Code: 51764-2 Heart 

Rate 1: 73 bpm  Height: 5'3" Code: 8302-2 Respiratory Rate: 17 bpm SpO2: 91%    

   

Temperature: 36.8 (C) / 98.2 (F)        Weight: 173 lbs  Code: 95370-2

 

                2020      Blood Pressure 1: 144/90 Code: 8480-6 BMI: 29.4 

Code: 05873-7 Heart 

Rate 1: 72 bpm      Height: 5'3" Code: 8302-2 SpO2: 98%           Temperature: 3

6.7 (C) / 98.0 

(F)                                     Weight: 166 lbs  Code: 31151-3







Functional Status

No Functional Status data



Reason For Visit





                    Reason For Visit    Effective Dates     Notes

 

                    hypertension        2021           

 

                    hypertension        06/15/2021           

 

                    hypertension        2021           

 

                    Hospital Follow Up  2021           

 

                    shortness of breath 2021           

 

                    shortness of breath 10/15/2020           

 

                    Hospital Follow Up  2020           







Encounters





             Encounter    Performer    Location     Codes        Date

 

                                        ) 93972 EST. PATIENT, LEVEL I

Diagnosis: CHF (congestive heart failure)[ICD10: I50.9]

Diagnosis: On supplemental oxygen therapy[ICD10: Z99.81] Kimberly Souza MD, [facility]          CPT-4: 27831              2021

 

                                        [phone]) 02126 EST. PATIENT, LEVEL IV

Diagnosis: Vitamin B12 deficiency (dietary) anemia[ICD10: D51.8]

Diagnosis: Essential (primary) hypertension[ICD10: I10]

Diagnosis: Atrophy of thyroid (acquired)[ICD10: E03.4] [facility]          CPT-4: 70946              2021

 

                                        [phone] 85579 EST. PATIENT, LEVEL IV

Diagnosis: Essential (primary) hypertension[ICD10: I10]

Diagnosis: Vitamin B12 deficiency (dietary) anemia[ICD10: D51.8]

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

Diagnosis: Localized edema[ICD10: R60.0] Franca mejia MD, [facility]

                          CPT-4: 02716              06/15/2021

 

                                        [phone] 48786 EST. PATIENT, LEVEL IV

Diagnosis: CHF (congestive heart failure)[ICD10: I50.9]

Diagnosis: Essential (primary) hypertension[ICD10: I10]

Diagnosis: Vitamin B12 deficiency (dietary) anemia[ICD10: D51.8] Franca Souza MD, [facility] CPT-4: 75304        2021

 

                                        [phone] 56375 EST. PATIENT, LEVEL IV

Diagnosis: Acute on chronic diastolic congestive heart failure[ICD10: I50.33]

Diagnosis: Anemia[ICD10: D64.9]

Diagnosis: Essential (primary) hypertension[ICD10: I10] Franca Souza MD, [facility]          CPT-4: 20528              2021

 

                                        [phone] 52718 EST. PATIENT, LEVEL IV

Diagnosis: Essential (primary) hypertension[ICD10: I10]

Diagnosis: CHF (congestive heart failure)[ICD10: I50.9]

Diagnosis: Atrophy of thyroid (acquired)[ICD10: E03.4] Kimberly Souza MD, [facility]          CPT-4: 09861              2021

 

                                        [phone] 12955 EST. PATIENT, LEVEL IV

Diagnosis: Essential (primary) hypertension[ICD10: I10]

Diagnosis: CHF (congestive heart failure)[ICD10: I50.9]

Diagnosis: On supplemental oxygen therapy[ICD10: Z99.81]

Diagnosis: Atrophy of thyroid (acquired)[ICD10: E03.4] Kimberly Souza MD, [facility]          CPT-4: 01862              10/15/2020

 

                                        OFFICE  VISIT, NEW - LEVEL 3

Diagnosis: CHF (congestive heart failure)[ICD10: I50.9]

Diagnosis: Dementia without behavioral disturbance, unspecified dementia 
type[ICD10: F03.90] Lynn Souza MD, [facility] CPT-4: 60080    







Plan of Care





             Planned Activity Notes        Codes        Status       Date

 

             Appointment:                            Nurse Visit  2021

 

             Patient Education: Patient Medication Summary                      

     Completed    2021

 

                          Visit Plan:               Hypertension - well controll

ed - continue with current medications,

continue with no added salt diet. Pt has been encouraged to exercise daily. The 
pt has been advised to call the office if there are any acute concerns about 
change in blood pressure readings at home. Hypothyroidism - pt with chronic 
hypothyroidism, continue with current medication, will monitor pt for signs or 
symptoms of lack of adequate supplementation. Pt is to continue with current 
dose of medication unless directed otherwise. Check labs at regular intervals q 
3 months or q 6 months based on previous levels of control. B12 def -injection 
today

                                                            2021

 

                                        Appointment: Franca Urbina 

WPtel:[phone] [address] [facility]

                                              (30 min) Complex 2021

 

             Patient Education: Patient Medication Summary                      

     Completed    2021

 

             Appointment:                            Injection    2021

 

             Patient Education: Patient Medication Summary                      

     Completed    2021

 

             Patient Education: Patient Medication Summary                      

     Completed    2021

 

             Care Plan: Cbc With Differential                           Pending 

     2021

 

             Care Plan: Comp Metabolic                           Pending      

 

             Care Plan: Tsh                           Pending      2021

 

             Care Plan: Magnesium                           Pending      

021

 

             Care Plan: Free T4                           Pending      

 

                          Visit Plan:               Hypertension - well controll

ed - continue with current medications,

continue with no added salt diet. Pt has been encouraged to exercise daily. The 
pt has been advised to call the office if there are any acute concerns about 
change in blood pressure readings at home. Anemia- recheck labs -continue b12 
injections Edema - pt has been advised to elevate legs to prevent dependent 
edema, compression has been recommended to help to naturally decrease peripheral
edema. Diuretic use has been discussed and pt has been instructed in appropriate
use of such medication as necessary to further attempt to reduce peripheral 
edema. Hypothyroidism -check levels with next labs

                                                            06/15/2021

 

                                        Appointment: Franca Urbina 

WPtel:[phone]

                                        [address] [facility]

                                              (30 min) Complex 06/15/2021

 

             Patient Education: Patient Medication Summary                      

     Completed    06/15/2021

 

                                        Appointment: Franca Urbina 

WPtel:[phone]

                                        [facility] [facility]

                                              (30 min) Complex 2021

 

                                        Appointment: LibbyKimberly 

WPtel:[phone]

                                        [address] [facility]

                                              (15 min) Moderate 04/15/2021

 

                          Visit Plan:               Hypertension - well controll

ed - continue with current medications,

continue with no added salt diet. Pt has been encouraged to exercise daily. The 
pt has been advised to call the office if there are any acute concerns about 
change in blood pressure readings at home. CHF - congestive heart failure - Pt 
has Chronic congestive heart failure - and is currently fairly well maintained 
on the current medications. Today there is no change in the treatment course. If
symptoms worsen, increase edema or more that 2-3 pound weight gain over a week 
without improvement in the symptoms with a decrease in the sodium of the diet, 
then the pt is to have the office alerted. Anemia- continue with B12 injections 
per 

                                                            2021

 

                                        Appointment: Franca Urbina 

WPtel:[phone]

                                        [facility] [facility]

                                              (30 min) Complex 2021

 

             Patient Education: Patient Medication Summary                      

     Completed    2021

 

                          Visit Plan:               Acute on chronic CHF - patie

nt refuses cardiology -continue with 

oxygen at all times- patient did not wear oxygen to appt- patient and sister 
verbalized understanding of plan. Did recommend follow up with cardiology due to
recurrent hospitalizations for CHF and patient continues to refuse Anemia- 
repeat labs today HTN -controlled- no change in medications today

                                                            2021

 

                                        Appointment: Franca Urbina 

WPtel:[phone]

                                        [facility] [facility]

                                              (30 min) Complex 2021

 

             Patient Education: Patient Medication Summary                      

     Completed    2021

 

             Patient Education: Patient Medication Summary                      

     Completed    2021

 

                          Visit Plan:               Chronic Congestive heart sheryl

lure - symptoms stable on lasix -pt has

refused referral to Cardiology - she is not interested in any other physician 
and will not go to another specialist per her report - pt to continue with 
chronic oxygen therapy. Hypertension - well controlled - continue with current 
medications, continue with no added salt diet. Pt has been encouraged to 
exercise daily. The pt has been advised to call the office if there are any 
acute concerns about change in blood pressure readings at home. Hypothyroidism -
pt with chronic hypothyroidism, continue with current medication, will monitor 
pt for signs or symptoms of lack of adequate supplementation. Pt is to continue 
with current dose of medication unless directed otherwise. Check labs at regular
intervals q 3 months or q 6 months based on previous levels of control.

                                                            2021

 

             Patient Education: Patient Medication Summary                      

     Completed    2021

 

                                        Appointment: Kimberly Souza 

WPtel:[phone]

                                        [facility]

                                              (30 min) Complex 2020

 

                          Visit Plan:               Chronic Congestive heart sheryl

lure - symptoms stable on lasix - need 

to initiate referral to Dr. Mendoza - if not already done - pt to continue with 
chronic oxygen therapy - due to her weakness and need for easier to transport of
the oxygen from place to place, I have recommended that she needs a portable 
oxygen concentrator. She is too weak to transport large oxygen bottles from 
place to place and is too weak to move them up the stairs in her home. She has 
tripped over her oxygen tubing from the large oxygen concentrator in her house, 
therefore the extra long tubing is not safe to have in her home. She also has 
trouble getting the oxygen tanks moved from house to vehicles and around places 
if she goes shopping. Hypertension - uncontrolled - the patient's medications 
have been modified as documented in the visit note. The patient has been 
counseled to cut back on salt in diet for a no added salt diet, low fat diet, 
start an exercise program with low weight bearing exercises and higher aerobic 
activity for heart health. The patient is to check blood pressure readings as an
outpatient and either fax, call, or email the readings to the office next week 
for practitioner to review. The pt is to call for acute concerns. Increase 
losartan from 50mg daily to 50mg twice daily. Hypothyroidism - pt with chronic 
hypothyroidism, continue with current medication, will monitor pt for signs or 
symptoms of lack of adequate supplementation. Pt is to continue with current 
dose of medication unless directed otherwise. Check labs at regular intervals q 
3 months or q 6 months based on previous levels of control. Fasting labs to be 
done to be drawn by [facility].

                                                            10/15/2020

 

                                        Appointment: Kimberly Souza 

WPtel:[phone]

                                        [address] [facility]

[place]                                              (15 min) Moderate 10/15/2020

 

             Patient Education: Patient Medication Summary                      

     Completed    10/15/2020

 

                          Visit Plan:               CHF - congestive heart failu

re - Pt has Chronic congestive heart 

failure - and is currently fairly well maintained on the current medications. 
Today there is no change in the treatment course. If symptoms worsen, increase 
edema or more that 2-3 pound weight gain over a week without improvement in the 
symptoms with a decrease in the sodium of the diet, then the pt is to have the 
office alerted. Dementia - Pt with slowly progressive pattern. I have discussed 
with pt and family the prognosis of this disease state and the need for the 
family to anticipate further decline with behavior changes. Continue with 
current plan of treatment.

                                                            2020

 

             Patient Education: Patient Medication Summary                      

     Completed    2020







Instructions





                                        Comment

 

                                        . Hypertension - well controlled - pop

nue with current medications, continue 

with no added salt diet.  Pt has been encouraged to exercise daily.

The pt has been advised to call the office if there are any acute concerns about
change in blood pressure readings at home.



Hypothyroidism - pt with chronic hypothyroidism, continue with current 
medication, will monitor pt for signs or symptoms of lack of adequate 
supplementation.  Pt is to continue with current dose of medication unless 
directed otherwise.  Check labs at regular intervals q 3 months or q 6 months 
based on previous levels of control.



B12 def -injection today 



 

                                        HAVE HOME HEALTH DRAW LABS WHEN THEY DO 

NEXT B12 INJECTION - CBC, CMP, TSH, FREE

T4, BNP, MAGNESIUM . Hypertension - well controlled - continue with current 
medications, continue with no added salt diet.  Pt has been encouraged to 
exercise daily.

The pt has been advised to call the office if there are any acute concerns about
change in blood pressure readings at home.



Anemia- recheck labs -continue b12 injections 



Edema - pt has been advised to elevate legs to prevent dependent edema, 
compression has been recommended to help to naturally decrease peripheral edema.
 Diuretic use has been discussed and pt has been instructed in appropriate use 
of such medication as necessary to further attempt to reduce peripheral edema.



Hypothyroidism -check levels with next labs 

 

                                        CONTINUE LOW SODIUM DIET



CONTINUE HOME HEALTH WITH [facility] B12 INJECTIONS



FOLLOW UP WITH DR OVALLE FOR PFTS AND CT SCAN

                                        . Hypertension - well controlled - pop

nue with current medications, continue 

with no added salt diet.  Pt has been encouraged to exercise daily.

The pt has been advised to call the office if there are any acute concerns about
change in blood pressure readings at home.



CHF - congestive heart failure - Pt has Chronic congestive heart failure - and 
is currently fairly well maintained on the current medications.  Today there is 
no change in the treatment course.  If symptoms worsen, increase edema or more 
that 2-3 pound weight gain over a week without improvement in the symptoms with 
a decrease in the sodium of the diet, then the pt is to have the office alerted.



Anemia- continue with B12 injections per  



 

                                        . Acute on chronic CHF - patient refuses

 cardiology -continue with oxygen at all

times- patient did not wear oxygen to appt- patient and sister verbalized 
understanding of plan. Did recommend follow up with cardiology due to recurrent 
hospitalizations for CHF and patient continues to refuse 



Anemia- repeat labs today 



HTN -controlled- no change in medications today 

 

                                        . Chronic Congestive heart failure - sym

ptoms stable on lasix  -pt has refused 

referral to Cardiology - she is not interested in any other physician and will 
not go to another specialist per her report - pt to continue with chronic oxygen
therapy.



Hypertension - well controlled - continue with current medications, continue 
with no added salt diet.  Pt has been encouraged to exercise daily.

The pt has been advised to call the office if there are any acute concerns about
change in blood pressure readings at home.



Hypothyroidism - pt with chronic hypothyroidism, continue with current 
medication, will monitor pt for signs or symptoms of lack of adequate 
supplementation.  Pt is to continue with current dose of medication unless 
directed otherwise.  Check labs at regular intervals q 3 months or q 6 months 
based on previous levels of control.



 

                                        . Chronic Congestive heart failure - sym

ptoms stable on lasix  - need to 

initiate referral to Dr. Mendoza - if not already done - pt to continue with 
chronic oxygen therapy - due to her weakness and need for easier to transport of
the oxygen from place to place, I have recommended that she needs a portable 
oxygen concentrator.  She is too weak to transport large oxygen bottles from 
place to place and is too weak to move them up the stairs in her home.  She has 
tripped over her oxygen tubing from the large oxygen concentrator in her house, 
therefore the extra long tubing is not safe to have in her home.  She also has 
trouble getting the oxygen tanks moved from house to vehicles and around places 
if she goes shopping.



Hypertension - uncontrolled - the patient's medications have been modified as 
documented in the visit note.  The patient has been counseled to cut back on 
salt in diet for a no added salt diet, low fat diet, start an exercise program 
with low weight bearing exercises and higher aerobic activity for heart health. 


The patient is to check blood pressure readings as an outpatient and either fax,
call, or email the readings to the office next week for practitioner to review.

The pt is to call for acute concerns.

Increase losartan from 50mg daily to 50mg twice daily.



Hypothyroidism - pt with chronic hypothyroidism, continue with current 
medication, will monitor pt for signs or symptoms of lack of adequate 
supplementation.  Pt is to continue with current dose of medication unless 
directed otherwise.  Check labs at regular intervals q 3 months or q 6 months 
based on previous levels of control.



Fasting labs to be done to be drawn by [facility].

 

                                        Will set up with [facility]



pt is to consider moving to assisted living



pt is to get a life alert bracelet. CHF - congestive heart failure - Pt has 
Chronic congestive heart failure - and is currently fairly well maintained on 
the current medications.  Today there is no change in the treatment course.  If 
symptoms worsen, increase edema or more that 2-3 pound weight gain over a week 
without improvement in the symptoms with a decrease in the sodium of the diet, 
then the pt is to have the office alerted.



Dementia - Pt with slowly progressive pattern.  I have discussed with pt and 
family the prognosis of this disease state and the need for the family to 
anticipate further decline with behavior changes.  Continue with current plan of
treatment.









Medical Equipment

No Medical Equipment data



Health Concerns Section

Health Concerns data not found



Goals Section

Goals data not found



Interventions Section

Interventions data not found



Health Status Evaluations/Outcomes Section

Health Status Evaluations/Outcomes data not found



Advance Directives

No Advance Directive data

## 2021-10-18 NOTE — XMS REPORT
CCD document using C-CDA

                             Created on: 2021



Tierra Rizvi

External Reference #: [id number]

: 1934

Sex: Female



Demographics





                          Address                   [address]  [place]  [address]

 

                          Home Phone                [phone]

 

                          Preferred Language        Unknown

 

                          Marital Status            Unknown

 

                          Anabaptist Affiliation     Unknown

 

                          Race                      White

 

                          Ethnic Group              Not  or 





Author





                          Author                    Tierra Moreno

 

                          Organization              Kimberly Souza MD, [facility]

 

                          Address                   [address] [place]  [address]



 

                          Phone                     [phone]







Care Team Providers





                    Care Team Member Name Role                Phone

 

                    Kimberly Souza     PP                  Unavailable

 

                          CCM                       Unavailable







Summary Purpose

Interface Exchange



Insurance Providers





             Payer name   Policy type / Coverage type Covered party ID Effective

 Begin Date 

Effective End Date

 

             [employer] Medicare Part B Medicare Part B [id number]  Unknown      Unkno

wn

 

             [facility] Medicare Part B [id number] Unkno

wn      Unknown







Family history





Sister



                          Diagnosis                 Age At Onset

 

                          Arthritis                 Unknown

 

                          Hypertension              Unknown

 

                          Anemia                    Unknown

 

                          Diabetes mellitus Type 2  Unknown

 

                          Osteoporosis              Unknown







Son



                          Diagnosis                 Age At Onset

 

                          Myocardial infarction     Unknown

 

                          Hypertension              Unknown

 

                          Diabetes mellitus Type 2  Unknown







Father



                          Diagnosis                 Age At Onset

 

                          Cancer                    Unknown

 

                          Alcoholism                Unknown

 

                          kidney disease            Unknown







Mother



                          Diagnosis                 Age At Onset

 

                          Cancer                    Unknown







Brother



                          Diagnosis                 Age At Onset

 

                          Hypertension              Unknown

 

                          Cancer                    Unknown

 

                          Alcoholism                Unknown

 

                          Diabetes mellitus Type 2  Unknown

 

                          kidney disease            Unknown

 

                          Myocardial infarction     Unknown







Social History





                Social History Element Codes           Description     Effective

 Dates

 

                Marital status  Unknown                  2020

 

                Employment      Unknown         Retired         2020

 

                    Tobacco history     SNOMED CT: 1817964  Quit over 10 years a

go

                          2020

 

                Alcohol history SNOMED CT: 194585414 Never drinks alcohol 2020







Allergies, Adverse Reactions, Alerts





           Substance  Reaction   Codes      Entered Date Inactivated Date Status

 

           Penicillin rash,      Unknown    2020 No Inactive Date Active

 

             * OTHER REACTION - SEE ANSWER BOX Tetracycline- Rash Unknown      0

2020   No 

Inactive Date                           Active







Problems





                Condition       Codes           Effective Dates Condition Status

 

                          Atrophy of thyroid (acquired) ICD-10: E03.4

ICD-9: 244.8              10/15/2020                Active

 

                          Essential (primary) hypertension ICD-10: I10

ICD-9: 401.1              10/15/2020                Active

 

                          Vitamin B12 deficiency (dietary) anemia ICD-10: D51.8

ICD-9: 281.1              2021                Active

 

                          CHF (congestive heart failure) ICD-10: I50.9

ICD-9: 428.0              2020                Active

 

                          Localized edema           ICD-10: R60.0

ICD-9: 782.3              06/15/2021                Active

 

                          Acute on chronic diastolic congestive heart failure IC

D-10: I50.33

ICD-9: 428.33             2021                Active

 

                          Anemia                    ICD-10: D64.9

ICD-9: 285.9              2021                Active

 

                          On supplemental oxygen therapy ICD-10: Z99.81

ICD-9: V46.2              10/15/2020                Active

 

                          Dementia without behavioral disturbance, unspecified d

ementia type ICD-10: 

F03.90

ICD-9: 294.20             2020                Active







Medications





          Medication Codes     Instructions Start Date Stop Date Status    Fill 

Instructions

 

                    cyanocobalamin (vit B-12) 1,000 mcg/mL injection solution Rx

Norm: 486714      Take 

Milliliter(s) Injection 2021      Inactive         

 

                    cyanocobalamin (vit B-12) 1,000 mcg/mL injection solution Rx

Norm: 229307      Take 

Milliliter(s) Injection 2021      Inactive         

 

                          albuterol sulfate 2.5 mg/3 mL (0.083 %) solution for n

ebulization RxNorm: 448710

             1 Unit Dose Inhalation two times a day DX J43.1 2021   Active        

 

                losartan 50 mg tablet RxNorm: 867309  1 Tablet(s) Oral two times

 a day 2021          Active               

 

                          albuterol sulfate 2.5 mg/3 mL (0.083 %) solution for n

ebulization RxNorm: 497542

             1 Unit Dose Inhalation two times a day DX J43.1 2021   Inactive      

 

                          albuterol sulfate 2.5 mg/3 mL (0.083 %) solution for n

ebulization RxNorm: 800295

             1 Unit Dose Inhalation two times a day 2021   In

active      

 

                          albuterol sulfate 2.5 mg/3 mL (0.083 %) solution for n

ebulization RxNorm: 521163

             1 Unit Dose Inhalation two times a day 2021   In

active      

 

             Norvasc 5 mg tablet RxNorm: 349868 1 Tablet(s) Oral every day 2022                Active                     

 

             Norvasc 5 mg tablet RxNorm: 306462 1 Tablet(s) Oral every day 2021                Inactive                   

 

                    metoprolol succinate ER 25 mg tablet,extended release 24 hr 

RxNorm: 475308      1 

Tablet(s) Oral every day 2021      No Stop Date    Active           

 

                    Ventolin HFA 90 mcg/actuation aerosol inhaler RxNorm: 696359

      1-2 Puff(s) 

Inhalation Every 4 hrs as needed 2021      No Stop Date    Active         

  

 

             furosemide 20 mg tablet RxNorm: 563539 1 Tablet(s) Oral every day 0

2021   No 

Stop Date                 Active                     

 

                    furosemide 40 mg tablet RxNorm: 451847      1 Tablet(s) Oral

 as directed 40mg BID x 5

days then 40mg in am and 20mg afternoon thereafter 2020          

Inactive                                give qty sufficient

 

                    potassium chloride ER 10 mEq tablet,extended release RxNorm:

 430910      1 Tablet(s) 

Oral as directed 1 tab bid x 5 days then resume daily 2020          

Inactive                                qty sufficient

 

                levothyroxine 125 mcg tablet RxNorm: 388225  1 Tablet(s) Oral ev

berta day 

10/15/2020          10/10/2021          Active               

 

                    potassium bicarbonate-citric acid 10 mEq effervescent tablet

 RxNorm: 8537081     1 

Tablet(s) Oral every day 10/15/2020      2020      Inactive         

 

                losartan 50 mg tablet RxNorm: 739303  1 Tablet(s) Oral two times

 a day 10/15/2020

                    2021          Inactive             

 

             furosemide 40 mg tablet RxNorm: 382271 1 Tablet(s) Oral every day 1

0/15/2020   

2020                Inactive                   

 

             Zyrtec 10 mg tablet RxNorm: 5012760 1 Tablet(s) Oral every day 10/0

2020   

2021                Inactive                   

 

                levothyroxine 125 mcg tablet RxNorm: 755976  1 Tablet(s) Oral ev

berta day 

10/05/2020          10/14/2020          Inactive             

 

             Zyrtec 10 mg tablet RxNorm: 6857466 1 Tablet(s) Oral every day 10/0

2020   

10/04/2020                Inactive                   

 

                aspirin 81 mg tablet,delayed release RxNorm: 126571  1 Tablet(s)

 Oral every day 

2020          No Stop Date        Active               

 

             Vitamin C 250 mg tablet RxNorm: 205483 1 Tablet(s) Oral every day 0

2020   No 

Stop Date                 Active                     

 

                Vitamin D3 50 mcg (2,000 unit) tablet RxNorm: 603955  1 Tablet(s

) Oral every day 

2020          No Stop Date        Active               

 

                levothyroxine 88 mcg tablet RxNorm: 420108  1 Tablet(s) Oral ledy

ry day 2020

                    10/04/2020          Inactive             

 

             losartan 50 mg tablet RxNorm: 884065 1 Tablet(s) Oral every day 09/

   

10/14/2020                Inactive                   

 

             furosemide 40 mg tablet RxNorm: 516685 1 Tablet(s) Oral every day 0

2020   

10/14/2020                Inactive                   

 

                    potassium bicarbonate-citric acid 10 mEq effervescent tablet

 RxNorm: 9430655     1 

Tablet(s) Oral every day 2020      10/14/2020      Inactive         

 

          Women's Multivitamin oral RxNorm:   oral      2020           Act

maryjo     

 

          Calcium 600 oral RxNorm: 1897 oral      2020           Active   

  

 

          albuterol sulfate inhalation RxNorm: 789676 inhalation 2020     

      Active     







Medication Administered





             Medication   Codes        Instructions Start Date   Status

 

                    cyanocobalamin (vit B-12) 1,000 mcg/mL injection solution Rx

Norm: 842366      

Milliliter                2021                Active

 

                    cyanocobalamin (vit B-12) 1,000 mcg/mL injection solution Rx

Norm: 303679      

Milliliter                2021                No longer Active







Immunizations





                Vaccine         Codes           Date            Status

 

                Covid-19        CVX: 207        04/10/2021       

 

                Covid-19        CVX: 207        2021       

 

                Pneumococcal (Adult) Unknown         2019       

 

                Zoster          CVX: 121        2017       







Results





          Observation Observation Code Item      Item Code Result    Date      S

ervice Location

 

          [facility]      Ord40     Iron                32 ug/dl  2021 Unknown

 

          [facility]      Ord40     UIBC                402 ug/dL 2021 Unknown

 

          [facility]      Ord40     TIBC                434 ug/dL 2021 Unknown

 

          [facility]      Ord40     Fe-%Sat             7.4 %     2021 Unknown

 

          Ferritin  Ord22     FERRITIN            27.6 ng/mL 2021 Unknown

 

          B12       Xkc203    B12                 187.00 pg/ml 2021 Unknow

n

 

          Comp Metabolic Gzd908    NA                  139 mEq/L 2021 Unkn

own

 

          Comp Metabolic Ddm321    K                   4.1 mEq/L 2021 Unkn

own

 

          Comp Metabolic Vgp664    CL                  103 mEq/L 2021 Unkn

own

 

          Comp Metabolic Ssj144    CO2                 27.0 mEq/L 2021 Unk

nown

 

          Comp Metabolic Dwq593    ANION GAP           13        2021 Unkn

own

 

          Comp Metabolic Mue059    GLUCOSE             79 mg/dL  2021 Unkn

own

 

          Comp Metabolic Ovb608    Creat               0.7 mg/dL 2021 Unkn

own

 

          Comp Metabolic Ten636    eGFR                80 ml/min/1.73m2 20

21 Unknown

 

          Comp Metabolic Tdc647    BUN                 21 mg/dL  2021 Unkn

own

 

          Comp Metabolic Ruy471    B/C Ratio           28.8 Ratio 2021 Unk

nown

 

          Comp Metabolic Gfq942    CALCIUM             8.7 mg/dL 2021 Unkn

own

 

          Comp Metabolic Ddl843    ALK PHOS            74 U/L    2021 Unkn

own

 

          Comp Metabolic Nbp643    AST(SGOT)           15 U/L    2021 Unkn

own

 

          Comp Metabolic Qrb625    ALT(SGPT)           17 U/L    2021 Unkn

own

 

          Comp Metabolic Cys931    BILI T              0.4 mg/dL 2021 Unkn

own

 

          Comp Metabolic Kee604    ALBUMIN             3.5 g/dL  2021 Unkn

own

 

          Comp Metabolic Hcz901    TPRO                5.9 g/dL  2021 Unkn

own

 

          Comp Metabolic Nuv224    GLOB                2.4 g/dL  2021 Unkn

own

 

          Comp Metabolic Xle010    A/G Ratio           1.4 Ratio 2021 Unkn

own

 

          Comp Metabolic Lss745    Osmo                279 mOsmo 2021 Unkn

own

 

          Cbc With Differential Ord2      WBC                 7.63 K/ul 20

21 Unknown

 

          Cbc With Differential Ord2      RBC                 3.38 M/ul 20

21 Unknown

 

          Cbc With Differential Ord2      HGB                 9.4 g/dl  20

21 Unknown

 

          Cbc With Differential Ord2      Neut%               52.6 %    20

21 Unknown

 

          Cbc With Differential Ord2      HCT                 31.5 %    20

21 Unknown

 

          Cbc With Differential Ord2      Lymph%              33.8 %    20

21 Unknown

 

          Cbc With Differential Ord2      MCV                 93.2 fl   20

21 Unknown

 

          Cbc With Differential Ord2      Mono%               7.9 %     20

21 Unknown

 

          Cbc With Differential Ord2      MCH                 27.8 pg   20

21 Unknown

 

          Cbc With Differential Ord2      Eos%                5.4 %     20

21 Unknown

 

          Cbc With Differential Ord2      MCHC                29.8 pg   20

21 Unknown

 

          Cbc With Differential Ord2      PLT                 329 K/ul  20

21 Unknown

 

          Cbc With Differential Ord2      Baso%               0.3 %     20

21 Unknown

 

          Cbc With Differential Ord2      RDW                 15.2 %    20

21 Unknown

 

          Cbc With Differential Ord2      Neut ABS#           4.02 K/ul 20

21 Unknown

 

          Cbc With Differential Ord2      Lymph ABS#           2.58 K/ul 

021 Unknown

 

          Cbc With Differential Ord2      Mono ABS#           0.6 K/ul  20

21 Unknown

 

          Cbc With Differential Ord2      Eos ABS#            0.4 K/ul  20

21 Unknown

 

          Cbc With Differential Ord2      Baso ABS#           0.0 K/ul  20

21 Unknown







Procedures





                    Procedure           Codes               Date

 

                    THER/PROPH/DIAG INJ SC/IM CPT-4: 12639        2021

 

                    VITAMIN B12 INJECTION 1000 mcg CPT-4:         

 

                    THER/PROPH/DIAG INJ SC/IM CPT-4: 18863        2021







Vital Signs





                          Date                      Vital

 

                2021      Blood Pressure 1: 138/82 Code: 8480-6 BMI: 33.5 

Code: 86868-8 Heart 

Rate 1: 74 bpm      Height: 5'3" Code: 8302-2 SpO2: 98%           Temperature: 3

6.2 (C) / 97.1 

(F)                                     Weight: 189 lbs  Code: 28244-2

 

                06/15/2021      Blood Pressure 1: 134/80 Code: 8480-6 Heart Rate

 1: 77 bpm Height: 

5'3" Code: 8302-2         SpO2: 93%                 Temperature: 36.3 (C) / 97.3

 (F)

 

                2021      Blood Pressure 1: 136/88 Code: 8480-6 Heart Rate

 1: 60 bpm Height:  

Code: 8302-2        SpO2: 93%           Temperature: 36.3 (C) / 97.3 (F) Weight:

 174 lbs  Code: 

38221-5

 

                2021      Blood Pressure 1: 164/92 Code: 8480-6 BMI: 31.4 

Code: 96321-7 Heart 

Rate 1: 67 bpm      Height: 5'3" Code: 8302-2 SpO2: 94%           Temperature: 3

6.2 (C) / 97.1 

(F)                                     Weight: 177 lbs  Code: 63271-2

 

                2021      Blood Pressure 1: 134/72 Code: 8480-6 BMI: 30.8 

Code: 88618-6 Heart 

Rate 1: 74 bpm  Height: 5'3" Code: 8302-2 Respiratory Rate: 18 bpm SpO2: 95%    

   

Temperature: 36.3 (C) / 97.4 (F)        Weight: 174 lbs  Code: 68650-2

 

                10/15/2020      Blood Pressure 1: 202/94 Code: 8480-6 BMI: 30.6 

Code: 92604-1 Heart 

Rate 1: 73 bpm  Height: 5'3" Code: 8302-2 Respiratory Rate: 17 bpm SpO2: 91%    

   

Temperature: 36.8 (C) / 98.2 (F)        Weight: 173 lbs  Code: 48437-5

 

                2020      Blood Pressure 1: 144/90 Code: 8480-6 BMI: 29.4 

Code: 62348-4 Heart 

Rate 1: 72 bpm      Height: 5'3" Code: 8302-2 SpO2: 98%           Temperature: 3

6.7 (C) / 98.0 

(F)                                     Weight: 166 lbs  Code: 40002-4







Functional Status

No Functional Status data



Reason For Visit





                    Reason For Visit    Effective Dates     Notes

 

                    hypertension        2021           

 

                    hypertension        06/15/2021           

 

                    hypertension        2021           

 

                    Hospital Follow Up  2021           

 

                    shortness of breath 2021           

 

                    shortness of breath 10/15/2020           

 

                    Hospital Follow Up  2020           







Encounters





             Encounter    Performer    Location     Codes        Date

 

                                        [phone] EST. PATIENT, LEVEL IV

Diagnosis: Vitamin B12 deficiency (dietary) anemia[ICD10: D51.8]

Diagnosis: Essential (primary) hypertension[ICD10: I10]

Diagnosis: Atrophy of thyroid (acquired)[ICD10: E03.4] Franca Souza MD, [facility]          CPT-4: 35881              2021

 

                                        [phone] EST. PATIENT, LEVEL IV

Diagnosis: Essential (primary) hypertension[ICD10: I10]

Diagnosis: Vitamin B12 deficiency (dietary) anemia[ICD10: D51.8]

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

Diagnosis: Localized edema[ICD10: R60.0] Franca mejia MD, [facility]

                          CPT-4: 82887              06/15/2021

 

                                        [phone] EST. PATIENT, LEVEL IV

Diagnosis: CHF (congestive heart failure)[ICD10: I50.9]

Diagnosis: Essential (primary) hypertension[ICD10: I10]

Diagnosis: Vitamin B12 deficiency (dietary) anemia[ICD10: D51.8] Franca Souza MD, [facility] CPT-4: 10113        2021

 

                                        [phone] EST. PATIENT, LEVEL IV

Diagnosis: Acute on chronic diastolic congestive heart failure[ICD10: I50.33]

Diagnosis: Anemia[ICD10: D64.9]

Diagnosis: Essential (primary) hypertension[ICD10: I10] Franca Souza MD, [facility]          CPT-4: 93963              2021

 

                                        [phone] EST. PATIENT, LEVEL IV

Diagnosis: Essential (primary) hypertension[ICD10: I10]

Diagnosis: CHF (congestive heart failure)[ICD10: I50.9]

Diagnosis: Atrophy of thyroid (acquired)[ICD10: E03.4] Kimberly Souza MD, [facility]          CPT-4: 34238              2021

 

                                        [phone] EST. PATIENT, LEVEL IV

Diagnosis: Essential (primary) hypertension[ICD10: I10]

Diagnosis: CHF (congestive heart failure)[ICD10: I50.9]

Diagnosis: On supplemental oxygen therapy[ICD10: Z99.81]

Diagnosis: Atrophy of thyroid (acquired)[ICD10: E03.4] Kimberly Souza MD, [facility]          CPT-4: 70819              10/15/2020

 

                                        OFFICE  VISIT, NEW - LEVEL 3

Diagnosis: CHF (congestive heart failure)[ICD10: I50.9]

Diagnosis: Dementia without behavioral disturbance, unspecified dementia 
type[ICD10: F03.90] [facility] CPT-4: 86679    







Plan of Care





             Planned Activity Notes        Codes        Status       Date

 

                          Visit Plan:               Hypertension - well controll

ed - continue with current medications,

continue with no added salt diet. Pt has been encouraged to exercise daily. The 
pt has been advised to call the office if there are any acute concerns about 
change in blood pressure readings at home. Hypothyroidism - pt with chronic 
hypothyroidism, continue with current medication, will monitor pt for signs or 
symptoms of lack of adequate supplementation. Pt is to continue with current 
dose of medication unless directed otherwise. Check labs at regular intervals q 
3 months or q 6 months based on previous levels of control. B12 def -injection 
today

                                                            2021

 

             Patient Education: Patient Medication Summary                      

     Completed    2021

 

             Care Plan: Cbc With Differential                           Pending 

     2021

 

             Care Plan: Comp Metabolic                           Pending      

 

             Care Plan: B12                           Pending      2021

 

             Care Plan: Tsh                           Pending      2021

 

             Care Plan: Free T4                           Pending      

 

             Appointment:                            Injection    2021

 

             Patient Education: Patient Medication Summary                      

     Completed    2021

 

             Patient Education: Patient Medication Summary                      

     Completed    2021

 

             Care Plan: Cbc With Differential                           Pending 

     2021

 

             Care Plan: Comp Metabolic                           Pending      

 

             Care Plan: Tsh                           Pending      2021

 

             Care Plan: Magnesium                           Pending      

021

 

             Care Plan: Free T4                           Pending      

 

                          Visit Plan:               Hypertension - well controll

ed - continue with current medications,

continue with no added salt diet. Pt has been encouraged to exercise daily. The 
pt has been advised to call the office if there are any acute concerns about 
change in blood pressure readings at home. Anemia- recheck labs -continue b12 
injections Edema - pt has been advised to elevate legs to prevent dependent 
edema, compression has been recommended to help to naturally decrease peripheral
edema. Diuretic use has been discussed and pt has been instructed in appropriate
use of such medication as necessary to further attempt to reduce peripheral 
edema. Hypothyroidism -check levels with next labs

                                                            06/15/2021

 

                                        Appointment: Franca Urbinatel:[phone]

                                        [address] [id number]

US                                              (30 min) Complex 06/15/2021

 

             Patient Education: Patient Medication Summary                      

     Completed    06/15/2021

 

                                        Appointment: Franca Urbina 

WPtel:[phone]

                                        [address] [facility]

[place]                                              (30 min) Complex 2021

 

                                        Appointment: Kimberly Souza 

WPtel:[phone]

                                        [address] [facility]

[place]                                              (15 min) Moderate 04/15/2021

 

                          Visit Plan:               Hypertension - well controll

ed - continue with current medications,

continue with no added salt diet. Pt has been encouraged to exercise daily. The 
pt has been advised to call the office if there are any acute concerns about 
change in blood pressure readings at home. CHF - congestive heart failure - Pt 
has Chronic congestive heart failure - and is currently fairly well maintained 
on the current medications. Today there is no change in the treatment course. If
symptoms worsen, increase edema or more that 2-3 pound weight gain over a week 
without improvement in the symptoms with a decrease in the sodium of the diet, 
then the pt is to have the office alerted. Anemia- continue with B12 injections 
per HH

                                                            2021

 

                                        Appointment: Franca Urbina 

WPtel:[phone]

                                        [address] [facility]

[place]                                              (30 min) Complex 2021

 

             Patient Education: Patient Medication Summary                      

     Completed    2021

 

                          Visit Plan:               Acute on chronic CHF - patie

nt refuses cardiology -continue with 

oxygen at all times- patient did not wear oxygen to appt- patient and sister 
verbalized understanding of plan. Did recommend follow up with cardiology due to
recurrent hospitalizations for CHF and patient continues to refuse Anemia- 
repeat labs today HTN -controlled- no change in medications today

                                                            2021

 

                                        Appointment: Franca Urbina 

WPtel:[phone]

                                        [address] [facility]

[place]                                              (30 min) Complex 2021

 

             Patient Education: Patient Medication Summary                      

     Completed    2021

 

             Patient Education: Patient Medication Summary                      

     Completed    2021

 

                          Visit Plan:               Chronic Congestive heart sheryl

lure - symptoms stable on lasix -pt has

refused referral to Cardiology - she is not interested in any other physician 
and will not go to another specialist per her report - pt to continue with 
chronic oxygen therapy. Hypertension - well controlled - continue with current 
medications, continue with no added salt diet. Pt has been encouraged to 
exercise daily. The pt has been advised to call the office if there are any 
acute concerns about change in blood pressure readings at home. Hypothyroidism -
pt with chronic hypothyroidism, continue with current medication, will monitor 
pt for signs or symptoms of lack of adequate supplementation. Pt is to continue 
with current dose of medication unless directed otherwise. Check labs at regular
intervals q 3 months or q 6 months based on previous levels of control.

                                                            2021

 

             Patient Education: Patient Medication Summary                      

     Completed    2021

 

                                        Appointment: Kimberly Souza 

WPtel:[phone]

                                        [facility]

                                              (30 min) Complex 2020

 

                          Visit Plan:               Chronic Congestive heart sheryl

lure - symptoms stable on lasix - need 

to initiate referral to Dr. Mendoza - if not already done - pt to continue with 
chronic oxygen therapy - due to her weakness and need for easier to transport of
the oxygen from place to place, I have recommended that she needs a portable 
oxygen concentrator. She is too weak to transport large oxygen bottles from 
place to place and is too weak to move them up the stairs in her home. She has 
tripped over her oxygen tubing from the large oxygen concentrator in her house, 
therefore the extra long tubing is not safe to have in her home. She also has 
trouble getting the oxygen tanks moved from house to vehicles and around places 
if she goes shopping. Hypertension - uncontrolled - the patient's medications 
have been modified as documented in the visit note. The patient has been 
counseled to cut back on salt in diet for a no added salt diet, low fat diet, 
start an exercise program with low weight bearing exercises and higher aerobic 
activity for heart health. The patient is to check blood pressure readings as an
outpatient and either fax, call, or email the readings to the office next week 
for practitioner to review. The pt is to call for acute concerns. Increase 
losartan from 50mg daily to 50mg twice daily. Hypothyroidism - pt with chronic 
hypothyroidism, continue with current medication, will monitor pt for signs or 
symptoms of lack of adequate supplementation. Pt is to continue with current 
dose of medication unless directed otherwise. Check labs at regular intervals q 
3 months or q 6 months based on previous levels of control. Fasting labs to be 
done to be drawn by home health.

                                                            10/15/2020

 

                                        Appointment: Kimberly Souza 

WPtel:[phone]

                                        [facility]

[place]                                              (15 min) Moderate 10/15/2020

 

             Patient Education: Patient Medication Summary                      

     Completed    10/15/2020

 

                          Visit Plan:               CHF - congestive heart failu

re - Pt has Chronic congestive heart 

failure - and is currently fairly well maintained on the current medications. 
Today there is no change in the treatment course. If symptoms worsen, increase 
edema or more that 2-3 pound weight gain over a week without improvement in the 
symptoms with a decrease in the sodium of the diet, then the pt is to have the 
office alerted. Dementia - Pt with slowly progressive pattern. I have discussed 
with pt and family the prognosis of this disease state and the need for the 
family to anticipate further decline with behavior changes. Continue with 
current plan of treatment.

                                                            2020

 

             Patient Education: Patient Medication Summary                      

     Completed    2020







Instructions





                                        Comment

 

                                        . Hypertension - well controlled - pop

nue with current medications, continue 

with no added salt diet.  Pt has been encouraged to exercise daily.

The pt has been advised to call the office if there are any acute concerns about
change in blood pressure readings at home.



Hypothyroidism - pt with chronic hypothyroidism, continue with current 
medication, will monitor pt for signs or symptoms of lack of adequate 
supplementation.  Pt is to continue with current dose of medication unless 
directed otherwise.  Check labs at regular intervals q 3 months or q 6 months 
based on previous levels of control.



B12 def -injection today 



 

                                        HAVE HOME HEALTH DRAW LABS WHEN THEY DO 

NEXT B12 INJECTION - CBC, CMP, TSH, FREE

T4, BNP, MAGNESIUM . Hypertension - well controlled - continue with current 
medications, continue with no added salt diet.  Pt has been encouraged to 
exercise daily.

The pt has been advised to call the office if there are any acute concerns about
change in blood pressure readings at home.



Anemia- recheck labs -continue b12 injections 



Edema - pt has been advised to elevate legs to prevent dependent edema, 
compression has been recommended to help to naturally decrease peripheral edema.
 Diuretic use has been discussed and pt has been instructed in appropriate use 
of such medication as necessary to further attempt to reduce peripheral edema.



Hypothyroidism -check levels with next labs 

 

                                        CONTINUE LOW SODIUM DIET



CONTINUE HOME HEALTH WITH [facility] B12 INJECTIONS



FOLLOW UP WITH DR OVALLE FOR PFTS AND CT SCAN

                                        . Hypertension - well controlled - pop

nue with current medications, continue 

with no added salt diet.  Pt has been encouraged to exercise daily.

The pt has been advised to call the office if there are any acute concerns about
change in blood pressure readings at home.



CHF - congestive heart failure - Pt has Chronic congestive heart failure - and 
is currently fairly well maintained on the current medications.  Today there is 
no change in the treatment course.  If symptoms worsen, increase edema or more 
that 2-3 pound weight gain over a week without improvement in the symptoms with 
a decrease in the sodium of the diet, then the pt is to have the office alerted.



Anemia- continue with B12 injections per  



 

                                        . Acute on chronic CHF - patient refuses

 cardiology -continue with oxygen at all

times- patient did not wear oxygen to appt- patient and sister verbalized 
understanding of plan. Did recommend follow up with cardiology due to recurrent 
hospitalizations for CHF and patient continues to refuse 



Anemia- repeat labs today 



HTN -controlled- no change in medications today 

 

                                        . Chronic Congestive heart failure - sym

ptoms stable on lasix  -pt has refused 

referral to Cardiology - she is not interested in any other physician and will 
not go to another specialist per her report - pt to continue with chronic oxygen
therapy.



Hypertension - well controlled - continue with current medications, continue 
with no added salt diet.  Pt has been encouraged to exercise daily.

The pt has been advised to call the office if there are any acute concerns about
change in blood pressure readings at home.



Hypothyroidism - pt with chronic hypothyroidism, continue with current 
medication, will monitor pt for signs or symptoms of lack of adequate 
supplementation.  Pt is to continue with current dose of medication unless 
directed otherwise.  Check labs at regular intervals q 3 months or q 6 months 
based on previous levels of control.



 

                                        . Chronic Congestive heart failure - sym

ptoms stable on lasix  - need to 

initiate referral to Dr. Mendoza - if not already done - pt to continue with 
chronic oxygen therapy - due to her weakness and need for easier to transport of
the oxygen from place to place, I have recommended that she needs a portable 
oxygen concentrator.  She is too weak to transport large oxygen bottles from 
place to place and is too weak to move them up the stairs in her home.  She has 
tripped over her oxygen tubing from the large oxygen concentrator in her house, 
therefore the extra long tubing is not safe to have in her home.  She also has 
trouble getting the oxygen tanks moved from house to vehicles and around places 
if she goes shopping.



Hypertension - uncontrolled - the patient's medications have been modified as 
documented in the visit note.  The patient has been counseled to cut back on 
salt in diet for a no added salt diet, low fat diet, start an exercise program 
with low weight bearing exercises and higher aerobic activity for heart health. 


The patient is to check blood pressure readings as an outpatient and either fax,
call, or email the readings to the office next week for practitioner to review.

The pt is to call for acute concerns.

Increase losartan from 50mg daily to 50mg twice daily.



Hypothyroidism - pt with chronic hypothyroidism, continue with current 
medication, will monitor pt for signs or symptoms of lack of adequate 
supplementation.  Pt is to continue with current dose of medication unless 
directed otherwise.  Check labs at regular intervals q 3 months or q 6 months 
based on previous levels of control.



Fasting labs to be done to be drawn by [email or web address].

 

                                        Will set up with [facility]



pt is to consider moving to assisted living



pt is to get a life alert bracelet. CHF - congestive heart failure - Pt has 
Chronic congestive heart failure - and is currently fairly well maintained on 
the current medications.  Today there is no change in the treatment course.  If 
symptoms worsen, increase edema or more that 2-3 pound weight gain over a week 
without improvement in the symptoms with a decrease in the sodium of the diet, 
then the pt is to have the office alerted.



Dementia - Pt with slowly progressive pattern.  I have discussed with pt and 
family the prognosis of this disease state and the need for the family to 
anticipate further decline with behavior changes.  Continue with current plan of
treatment.









Medical Equipment

No Medical Equipment data



Health Concerns Section

Health Concerns data not found



Goals Section

Goals data not found



Interventions Section

Interventions data not found



Health Status Evaluations/Outcomes Section

Health Status Evaluations/Outcomes data not found



Advance Directives

No Advance Directive data

## 2021-10-18 NOTE — XMS REPORT
CCD document using C-CDA

                             Created on: 10/07/2021



Tierra Rizvi

External Reference #: [id number]

: 1934

Sex: Female



Demographics





                          Address                   [address]  [place]  [address]

 

                          Home Phone                [phone]

 

                          Preferred Language        Unknown

 

                          Marital Status            Unknown

 

                          Voodoo Affiliation     Unknown

 

                          Race                      White

 

                          Ethnic Group              Not  or 





Author





                          Author                    Tierra Moreno

 

                          Organization              Kimberly Souza MD, [facility]

 

                          Address                   [address] [place]  [address]



 

                          Phone                     [phone]







Care Team Providers





                    Care Team Member Name Role                Phone

 

                    Kimberly Souza     PP                  Unavailable

 

                          CCM                       Unavailable







Summary Purpose

Interface Exchange



Insurance Providers





             Payer name   Policy type / Coverage type Covered party ID Effective

 Begin Date 

Effective End Date

 

             [employer] Medicare Part B Medicare Part B [id number]  Unknown      Unkno

wn

 

             [facility] Medicare Part B [id number] Unkno

wn      Unknown







Family history





Sister



                          Diagnosis                 Age At Onset

 

                          Arthritis                 Unknown

 

                          Hypertension              Unknown

 

                          Anemia                    Unknown

 

                          Diabetes mellitus Type 2  Unknown

 

                          Osteoporosis              Unknown







Son



                          Diagnosis                 Age At Onset

 

                          Myocardial infarction     Unknown

 

                          Hypertension              Unknown

 

                          Diabetes mellitus Type 2  Unknown







Father



                          Diagnosis                 Age At Onset

 

                          Cancer                    Unknown

 

                          Alcoholism                Unknown

 

                          kidney disease            Unknown







Mother



                          Diagnosis                 Age At Onset

 

                          Cancer                    Unknown







Brother



                          Diagnosis                 Age At Onset

 

                          Hypertension              Unknown

 

                          Cancer                    Unknown

 

                          Alcoholism                Unknown

 

                          Diabetes mellitus Type 2  Unknown

 

                          kidney disease            Unknown

 

                          Myocardial infarction     Unknown







Social History





                Social History Element Codes           Description     Effective

 Dates

 

                Marital status  Unknown                  2020

 

                Employment      Unknown         Retired         2020

 

                    Tobacco history     SNOMED CT: 2846729  Quit over 10 years a

go

                          2020

 

                Alcohol history SNOMED CT: 497755782 Never drinks alcohol 2020







Allergies, Adverse Reactions, Alerts





           Substance  Reaction   Codes      Entered Date Inactivated Date Status

 

           Penicillin rash,      Unknown    2020 No Inactive Date Active

 

             * OTHER REACTION - SEE ANSWER BOX Tetracycline- Rash Unknown      0

2020   No 

Inactive Date                           Active







Problems





                Condition       Codes           Effective Dates Condition Status

 

                          CHF (congestive heart failure) ICD-10: I50.9

ICD-9: 428.0              2020                Active

 

                          On supplemental oxygen therapy ICD-10: Z99.81

ICD-9: V46.2              10/15/2020                Active

 

                          Atrophy of thyroid (acquired) ICD-10: E03.4

ICD-9: 244.8              10/15/2020                Active

 

                          Essential (primary) hypertension ICD-10: I10

ICD-9: 401.1              10/15/2020                Active

 

                          Vitamin B12 deficiency (dietary) anemia ICD-10: D51.8

ICD-9: 281.1              2021                Active

 

                          Localized edema           ICD-10: R60.0

ICD-9: 782.3              06/15/2021                Active

 

                          Acute on chronic diastolic congestive heart failure IC

D-10: I50.33

ICD-9: 428.33             2021                Active

 

                          Anemia                    ICD-10: D64.9

ICD-9: 285.9              2021                Active

 

                          Dementia without behavioral disturbance, unspecified d

ementia type ICD-10: 

F03.90

ICD-9: 294.20             2020                Active







Medications





          Medication Codes     Instructions Start Date Stop Date Status    Fill 

Instructions

 

                levothyroxine 112 mcg tablet RxNorm: 171562  1 Tablet(s) Oral ev

berta day 

10/07/2021          2022          Active               

 

                levothyroxine 112 mcg tablet RxNorm: 109778  1 Tablet(s) Oral ev

berta day 

10/07/2021          10/07/2021          Inactive             

 

                levothyroxine 112 mcg tablet RxNorm: 270243  1 Tablet(s) Oral ev

berta day 

2021          10/07/2021          Inactive             

 

                    cyanocobalamin (vit B-12) 1,000 mcg/mL injection solution Rx

Norm: 545986      Take 

Milliliter(s) Injection 2021      Inactive         

 

                    cyanocobalamin (vit B-12) 1,000 mcg/mL injection solution Rx

Norm: 940911      Take 

Milliliter(s) Injection 2021      Inactive         

 

                          albuterol sulfate 2.5 mg/3 mL (0.083 %) solution for n

ebulization RxNorm: 035052

             1 Unit Dose Inhalation two times a day DX J43.1 2021   Inactive      

 

                losartan 50 mg tablet RxNorm: 329097  1 Tablet(s) Oral two times

 a day 2021          Active               

 

                          albuterol sulfate 2.5 mg/3 mL (0.083 %) solution for n

ebulization RxNorm: 565202

             1 Unit Dose Inhalation two times a day DX J43.1 2021   Inactive      

 

                          albuterol sulfate 2.5 mg/3 mL (0.083 %) solution for n

ebulization RxNorm: 829812

             1 Unit Dose Inhalation two times a day 2021   In

active      

 

                          albuterol sulfate 2.5 mg/3 mL (0.083 %) solution for n

ebulization RxNorm: 479422

             1 Unit Dose Inhalation two times a day 2021   In

active      

 

             Norvasc 5 mg tablet RxNorm: 150346 1 Tablet(s) Oral every day 2022                Active                     

 

             Norvasc 5 mg tablet RxNorm: 920258 1 Tablet(s) Oral every day 2021                Inactive                   

 

                    metoprolol succinate ER 25 mg tablet,extended release 24 hr 

RxNorm: 163092      1 

Tablet(s) Oral every day 2021      No Stop Date    Active           

 

                    Ventolin HFA 90 mcg/actuation aerosol inhaler RxNorm: 972424

      1-2 Puff(s) 

Inhalation Every 4 hrs as needed 2021      No Stop Date    Active         

  

 

             furosemide 20 mg tablet RxNorm: 298397 1 Tablet(s) Oral every day 0

2021   No 

Stop Date                 Active                     

 

                    furosemide 40 mg tablet RxNorm: 157084      1 Tablet(s) Oral

 as directed 40mg BID x 5

days then 40mg in am and 20mg afternoon thereafter 2020          

Inactive                                give qty sufficient

 

                    potassium chloride ER 10 mEq tablet,extended release RxNorm:

 125354      1 Tablet(s) 

Oral as directed 1 tab bid x 5 days then resume daily 2020          

Inactive                                qty sufficient

 

                    potassium bicarbonate-citric acid 10 mEq effervescent tablet

 RxNorm: 2158383     1 

Tablet(s) Oral every day 10/15/2020      2020      Inactive         

 

                levothyroxine 125 mcg tablet RxNorm: 537016  1 Tablet(s) Oral ev

berta day 

10/15/2020          2021          Inactive             

 

                losartan 50 mg tablet RxNorm: 218464  1 Tablet(s) Oral two times

 a day 10/15/2020

                    2021          Inactive             

 

             furosemide 40 mg tablet RxNorm: 943336 1 Tablet(s) Oral every day 1

0/15/2020   

2020                Inactive                   

 

             Zyrtec 10 mg tablet RxNorm: 1065393 1 Tablet(s) Oral every day 10/0

2020   

2021                Inactive                   

 

                levothyroxine 125 mcg tablet RxNorm: 634588  1 Tablet(s) Oral ev

berta day 

10/05/2020          10/14/2020          Inactive             

 

             Zyrtec 10 mg tablet RxNorm: 2972455 1 Tablet(s) Oral every day 10/0

2020   

10/04/2020                Inactive                   

 

                aspirin 81 mg tablet,delayed release RxNorm: 845437  1 Tablet(s)

 Oral every day 

2020          No Stop Date        Active               

 

             Vitamin C 250 mg tablet RxNorm: 452997 1 Tablet(s) Oral every day 0

2020   No 

Stop Date                 Active                     

 

                Vitamin D3 50 mcg (2,000 unit) tablet RxNorm: 187510  1 Tablet(s

) Oral every day 

2020          No Stop Date        Active               

 

                levothyroxine 88 mcg tablet RxNorm: 471857  1 Tablet(s) Oral ledy

ry day 2020

                    10/04/2020          Inactive             

 

             losartan 50 mg tablet RxNorm: 895771 1 Tablet(s) Oral every day 09/

   

10/14/2020                Inactive                   

 

             furosemide 40 mg tablet RxNorm: 657021 1 Tablet(s) Oral every day 0

2020   

10/14/2020                Inactive                   

 

                    potassium bicarbonate-citric acid 10 mEq effervescent tablet

 RxNorm: 4086933     1 

Tablet(s) Oral every day 2020      10/14/2020      Inactive         

 

          Women's Multivitamin oral RxNorm:   oral      2020           Act

maryjo     

 

          Calcium 600 oral RxNorm: 1897 oral      2020           Active   

  

 

          albuterol sulfate inhalation RxNorm: 940794 inhalation 2020     

      Active     







Medication Administered





             Medication   Codes        Instructions Start Date   Status

 

                    cyanocobalamin (vit B-12) 1,000 mcg/mL injection solution Rx

Norm: 161082      

Milliliter                2021                No longer Active

 

                    cyanocobalamin (vit B-12) 1,000 mcg/mL injection solution Rx

Norm: 278835      

Milliliter                2021                No longer Active







Immunizations





                Vaccine         Codes           Date            Status

 

                Covid-19        CVX: 207        04/10/2021       

 

                Covid-19        CVX: 207        2021       

 

                Pneumococcal (Adult) Unknown         2019       

 

                Zoster          CVX: 121        2017       







Results





          Observation Observation Code Item      Item Code Result    Date      S

ervice Location

 

          Cbc With Differential Ord2      WBC                 8.64 K/ul 20

21 Unknown

 

          Cbc With Differential Ord2      RBC                 3.76 M/ul 20

21 Unknown

 

          Cbc With Differential Ord2      HGB                 9.9 g/dl  20

21 Unknown

 

          Cbc With Differential Ord2      Neut%               61.9 %    20

21 Unknown

 

          Cbc With Differential Ord2      HCT                 33.4 %    20

21 Unknown

 

          Cbc With Differential Ord2      MCV                 88.8 fl   20

21 Unknown

 

          Cbc With Differential Ord2      Lymph%              28.1 %    20

21 Unknown

 

          Cbc With Differential Ord2      Mono%               8.0 %     20

21 Unknown

 

          Cbc With Differential Ord2      MCH                 26.3 pg   20

21 Unknown

 

          Cbc With Differential Ord2      MCHC                29.6 pg   20

21 Unknown

 

          Cbc With Differential Ord2      Eos%                1.9 %     20

21 Unknown

 

          Cbc With Differential Ord2      PLT                 332 K/ul  20

21 Unknown

 

          Cbc With Differential Ord2      Baso%               0.1 %     20

21 Unknown

 

          Cbc With Differential Ord2      RDW                 15.2 %    20

21 Unknown

 

          Cbc With Differential Ord2      Neut ABS#           5.35 K/ul 20

21 Unknown

 

          Cbc With Differential Ord2      Lymph ABS#           2.43 K/ul 

021 Unknown

 

          Cbc With Differential Ord2      Mono ABS#           0.7 K/ul  20

21 Unknown

 

          Cbc With Differential Ord2      Eos ABS#            0.2 K/ul  20

21 Unknown

 

          Cbc With Differential Ord2      Baso ABS#           0.0 K/ul  20

21 Unknown

 

          Tsh       Ord6      TSH (3rd IS)           0.21 uIU/mL 2021 Unkn

own

 

          Comp Metabolic Xjw163    NA                  141 mEq/L 2021 Unkn

own

 

          Comp Metabolic Jmf529    K                   4.1 mEq/L 2021 Unkn

own

 

          Comp Metabolic Syc323    CL                  104 mEq/L 2021 Unkn

own

 

          Comp Metabolic Ftb935    CO2                 28.0 mEq/L 2021 Unk

nown

 

          Comp Metabolic Esu942    ANION GAP           13        2021 Unkn

own

 

          Comp Metabolic Tab626    GLUCOSE             78 mg/dL  2021 Unkn

own

 

          Comp Metabolic Gfv523    Creat               0.8 mg/dL 2021 Unkn

own

 

          Comp Metabolic Ose865    eGFR                71 ml/min/1.73m2 20

21 Unknown

 

          Comp Metabolic Iod482    BUN                 25 mg/dL  2021 Unkn

own

 

          Comp Metabolic Tlr750    B/C Ratio           30.9 Ratio 2021 Unk

nown

 

          Comp Metabolic Czg060    CALCIUM             8.7 mg/dL 2021 Unkn

own

 

          Comp Metabolic Ahh818    ALK PHOS            61 U/L    2021 Unkn

own

 

          Comp Metabolic Wde057    AST(SGOT)           17 U/L    2021 Unkn

own

 

          Comp Metabolic Ivb252    ALT(SGPT)           10 U/L    2021 Unkn

own

 

          Comp Metabolic Hyg610    BILI T              0.6 mg/dL 2021 Unkn

own

 

          Comp Metabolic Lim385    ALBUMIN             3.7 g/dL  2021 Unkn

own

 

          Comp Metabolic Nri757    TPRO                6.1 g/dL  2021 Unkn

own

 

          Comp Metabolic Npo647    GLOB                2.4 g/dL  2021 Unkn

own

 

          Comp Metabolic Ukb833    A/G Ratio           1.6 Ratio 2021 Unkn

own

 

          Comp Metabolic Thd319    Osmo                285 mOsmo 2021 Unkn

own

 

          B12       Kni719    B12                 >1500.00 pg/ml 2021 Unkn

own

 

          Free T4   Bhz254    FREE T4             1.23 ng/dL 2021 Unknown

 

          Tibc      Ord40     Iron                32 ug/dl  2021 Unknown

 

          Tibc      Ord40     UIBC                402 ug/dL 2021 Unknown

 

          Tibc      Ord40     TIBC                434 ug/dL 2021 Unknown

 

          Tibc      Ord40     Fe-%Sat             7.4 %     2021 Unknown

 

          Ferritin  Ord22     FERRITIN            27.6 ng/mL 2021 Unknown

 

          B12       Byp828    B12                 187.00 pg/ml 2021 Unknow

n

 

          Comp Metabolic Ysc244    NA                  139 mEq/L 2021 Unkn

own

 

          Comp Metabolic Oel249    K                   4.1 mEq/L 2021 Unkn

own

 

          Comp Metabolic Dhc312    CL                  103 mEq/L 2021 Unkn

own

 

          Comp Metabolic Kcc822    CO2                 27.0 mEq/L 2021 Unk

nown

 

          Comp Metabolic Lvm890    ANION GAP           13        2021 Unkn

own

 

          Comp Metabolic Gbw240    GLUCOSE             79 mg/dL  2021 Unkn

own

 

          Comp Metabolic Gtv006    Creat               0.7 mg/dL 2021 Unkn

own

 

          Comp Metabolic Gwh490    eGFR                80 ml/min/1.73m2 20

21 Unknown

 

          Comp Metabolic Kue493    BUN                 21 mg/dL  2021 Unkn

own

 

          Comp Metabolic Uzs345    B/C Ratio           28.8 Ratio 2021 Unk

nown

 

          Comp Metabolic Qol931    CALCIUM             8.7 mg/dL 2021 Unkn

own

 

          Comp Metabolic Gdu864    ALK PHOS            74 U/L    2021 Unkn

own

 

          Comp Metabolic Mbb584    AST(SGOT)           15 U/L    2021 Unkn

own

 

          Comp Metabolic Gtc872    ALT(SGPT)           17 U/L    2021 Unkn

own

 

          Comp Metabolic Xuf515    BILI T              0.4 mg/dL 2021 Unkn

own

 

          Comp Metabolic Dpd486    ALBUMIN             3.5 g/dL  2021 Unkn

own

 

          Comp Metabolic Pwf441    TPRO                5.9 g/dL  2021 Unkn

own

 

          Comp Metabolic Opa384    GLOB                2.4 g/dL  2021 Unkn

own

 

          Comp Metabolic Pdx710    A/G Ratio           1.4 Ratio 2021 Unkn

own

 

          Comp Metabolic Vvh373    Osmo                279 mOsmo 2021 Unkn

own

 

          Cbc With Differential Ord2      WBC                 7.63 K/ul 20

21 Unknown

 

          Cbc With Differential Ord2      RBC                 3.38 M/ul 20

21 Unknown

 

          Cbc With Differential Ord2      HGB                 9.4 g/dl  20

21 Unknown

 

          Cbc With Differential Ord2      Neut%               52.6 %    20

21 Unknown

 

          Cbc With Differential Ord2      HCT                 31.5 %    20

21 Unknown

 

          Cbc With Differential Ord2      MCV                 93.2 fl   20

21 Unknown

 

          Cbc With Differential Ord2      Lymph%              33.8 %    20

21 Unknown

 

          Cbc With Differential Ord2      MCH                 27.8 pg   20

21 Unknown

 

          Cbc With Differential Ord2      Mono%               7.9 %     20

21 Unknown

 

          Cbc With Differential Ord2      MCHC                29.8 pg   20

21 Unknown

 

          Cbc With Differential Ord2      Eos%                5.4 %     20

21 Unknown

 

          Cbc With Differential Ord2      Baso%               0.3 %     20

21 Unknown

 

          Cbc With Differential Ord2      PLT                 329 K/ul  20

21 Unknown

 

          Cbc With Differential Ord2      Neut ABS#           4.02 K/ul 20

21 Unknown

 

          Cbc With Differential Ord2      RDW                 15.2 %    20

21 Unknown

 

          Cbc With Differential Ord2      Lymph ABS#           2.58 K/ul 

021 Unknown

 

          Cbc With Differential Ord2      Mono ABS#           0.6 K/ul  20

21 Unknown

 

          Cbc With Differential Ord2      Eos ABS#            0.4 K/ul  20

21 Unknown

 

          Cbc With Differential Ord2      Baso ABS#           0.0 K/ul  20

21 Unknown







Procedures





                    Procedure           Codes               Date

 

                    THER/PROPH/DIAG INJ SC/IM CPT-4: 56097        2021

 

                    VITAMIN B12 INJECTION 1000 mcg CPT-4:         

 

                    THER/PROPH/DIAG INJ SC/IM CPT-4: 00013        2021







Vital Signs





                          Date                      Vital

 

                2021      SpO2: 96%       SpO2: 87%       SpO2: 94%

 

                2021      Blood Pressure 1: 138/82 Code: 8480-6 BMI: 33.5 

Code: 72700-9 Heart 

Rate 1: 74 bpm      Height: 5'3" Code: 8302-2 SpO2: 98%           Temperature: 3

6.2 (C) / 97.1 

(F)                                     Weight: 189 lbs  Code: 65571-1

 

                06/15/2021      Blood Pressure 1: 134/80 Code: 8480-6 Heart Rate

 1: 77 bpm Height: 

5'3" Code: 8302-2         SpO2: 93%                 Temperature: 36.3 (C) / 97.3

 (F)

 

                2021      Blood Pressure 1: 136/88 Code: 8480-6 Heart Rate

 1: 60 bpm Height:  

Code: 8302-2        SpO2: 93%           Temperature: 36.3 (C) / 97.3 (F) Weight:

 174 lbs  Code: 

49943-7

 

                2021      Blood Pressure 1: 164/92 Code: 8480-6 BMI: 31.4 

Code: 64566-6 Heart 

Rate 1: 67 bpm      Height: 5'3" Code: 8302-2 SpO2: 94%           Temperature: 3

6.2 (C) / 97.1 

(F)                                     Weight: 177 lbs  Code: 49871-1

 

                2021      Blood Pressure 1: 134/72 Code: 8480-6 BMI: 30.8 

Code: 05643-2 Heart 

Rate 1: 74 bpm  Height: 5'3" Code: 8302-2 Respiratory Rate: 18 bpm SpO2: 95%    

   

Temperature: 36.3 (C) / 97.4 (F)        Weight: 174 lbs  Code: 95330-1

 

                10/15/2020      Blood Pressure 1: 202/94 Code: 8480-6 BMI: 30.6 

Code: 32189-5 Heart 

Rate 1: 73 bpm  Height: 5'3" Code: 8302-2 Respiratory Rate: 17 bpm SpO2: 91%    

   

Temperature: 36.8 (C) / 98.2 (F)        Weight: 173 lbs  Code: 61233-7

 

                2020      Blood Pressure 1: 144/90 Code: 8480-6 BMI: 29.4 

Code: 86176-8 Heart 

Rate 1: 72 bpm      Height: 5'3" Code: 8302-2 SpO2: 98%           Temperature: 3

6.7 (C) / 98.0 

(F)                                     Weight: 166 lbs  Code: 47092-5







Functional Status

No Functional Status data



Reason For Visit





                    Reason For Visit    Effective Dates     Notes

 

                    hypertension        2021           

 

                    hypertension        06/15/2021           

 

                    hypertension        2021           

 

                    Hospital Follow Up  2021           

 

                    shortness of breath 2021           

 

                    shortness of breath 10/15/2020           

 

                    Hospital Follow Up  2020           







Encounters





             Encounter    Performer    Location     Codes        Date

 

                                        [phone] EST. PATIENT, LEVEL I

Diagnosis: CHF (congestive heart failure)[ICD10: I50.9]

Diagnosis: On supplemental oxygen therapy[ICD10: Z99.81] [facility]          CPT-4: 12535              2021

 

                                        [phone] EST. PATIENT, LEVEL IV

Diagnosis: Vitamin B12 deficiency (dietary) anemia[ICD10: D51.8]

Diagnosis: Essential (primary) hypertension[ICD10: I10]

Diagnosis: Atrophy of thyroid (acquired)[ICD10: E03.4] Franca Souza MD, [facility]          CPT-4: 37261              2021

 

                                        [phone] EST. PATIENT, LEVEL IV

Diagnosis: Essential (primary) hypertension[ICD10: I10]

Diagnosis: Vitamin B12 deficiency (dietary) anemia[ICD10: D51.8]

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

Diagnosis: Localized edema[ICD10: R60.0] Franca mejia MD, [facility]

                          CPT-4: 67145              06/15/2021

 

                                        [phone] EST. PATIENT, LEVEL IV

Diagnosis: CHF (congestive heart failure)[ICD10: I50.9]

Diagnosis: Essential (primary) hypertension[ICD10: I10]

Diagnosis: Vitamin B12 deficiency (dietary) anemia[ICD10: D51.8] Franca Souaz MD, [facility] CPT-4: 48267        2021

 

                                        [phone] EST. PATIENT, LEVEL IV

Diagnosis: Acute on chronic diastolic congestive heart failure[ICD10: I50.33]

Diagnosis: Anemia[ICD10: D64.9]

Diagnosis: Essential (primary) hypertension[ICD10: I10] Franca Souza MD, [facility]          CPT-4: 63418              2021

 

                                        [phone] EST. PATIENT, LEVEL IV

Diagnosis: Essential (primary) hypertension[ICD10: I10]

Diagnosis: CHF (congestive heart failure)[ICD10: I50.9]

Diagnosis: Atrophy of thyroid (acquired)[ICD10: E03.4] Kimberly Souza MD, [facility]          CPT-4: 32161              2021

 

                                        [id number] EST. PATIENT, LEVEL IV

Diagnosis: Essential (primary) hypertension[ICD10: I10]

Diagnosis: CHF (congestive heart failure)[ICD10: I50.9]

Diagnosis: On supplemental oxygen therapy[ICD10: Z99.81]

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

 

                                        OFFICE  VISIT, NEW - LEVEL 3

Diagnosis: CHF (congestive heart failure)[ICD10: I50.9]

Diagnosis: Dementia without behavioral disturbance, unspecified dementia 
type[ICD10: F03.90] [facility] CPT-4: 42831    







Plan of Care





             Planned Activity Notes        Codes        Status       Date

 

             Appointment:                            Nurse Visit  2021

 

             Patient Education: Patient Medication Summary                      

     Completed    2021

 

                          Visit Plan:               Hypertension - well controll

ed - continue with current medications,

continue with no added salt diet. Pt has been encouraged to exercise daily. The 
pt has been advised to call the office if there are any acute concerns about 
change in blood pressure readings at home. Hypothyroidism - pt with chronic 
hypothyroidism, continue with current medication, will monitor pt for signs or 
symptoms of lack of adequate supplementation. Pt is to continue with current 
dose of medication unless directed otherwise. Check labs at regular intervals q 
3 months or q 6 months based on previous levels of control. B12 def -injection 
today

                                                            2021

 

                                        Appointment: Franca Urbina 

WPtel:[phone]

                                        [facility]

                                              (30 min) Complex 2021

 

             Patient Education: Patient Medication Summary                      

     Completed    2021

 

             Appointment:                            Injection    2021

 

             Patient Education: Patient Medication Summary                      

     Completed    2021

 

             Patient Education: Patient Medication Summary                      

     Completed    2021

 

             Care Plan: Cbc With Differential                           Pending 

     2021

 

             Care Plan: Comp Metabolic                           Pending      

 

             Care Plan: Tsh                           Pending      2021

 

             Care Plan: Magnesium                           Pending      

021

 

             Care Plan: Free T4                           Pending      

 

                          Visit Plan:               Hypertension - well controll

ed - continue with current medications,

continue with no added salt diet. Pt has been encouraged to exercise daily. The 
pt has been advised to call the office if there are any acute concerns about 
change in blood pressure readings at home. Anemia- recheck labs -continue b12 
injections Edema - pt has been advised to elevate legs to prevent dependent 
edema, compression has been recommended to help to naturally decrease peripheral
edema. Diuretic use has been discussed and pt has been instructed in appropriate
use of such medication as necessary to further attempt to reduce peripheral 
edema. Hypothyroidism -check levels with next labs

                                                            06/15/2021

 

                                        Appointment: Franca Urbina 

WPtel:[phone]

                                        [address] [facility]

[place]                                              (30 min) Complex 06/15/2021

 

             Patient Education: Patient Medication Summary                      

     Completed    06/15/2021

 

                                        Appointment: Franca Urbina 

WPtel:[phone]

                                        [address] [facility]

[place]                                              (30 min) Complex 2021

 

                                        Appointment: Kimberly Souza 

WPtel:[phone]

                                        [address] [facility]

[place]                                              (15 min) Moderate 04/15/2021

 

                          Visit Plan:               Hypertension - well controll

ed - continue with current medications,

continue with no added salt diet. Pt has been encouraged to exercise daily. The 
pt has been advised to call the office if there are any acute concerns about 
change in blood pressure readings at home. CHF - congestive heart failure - Pt 
has Chronic congestive heart failure - and is currently fairly well maintained 
on the current medications. Today there is no change in the treatment course. If
symptoms worsen, increase edema or more that 2-3 pound weight gain over a week 
without improvement in the symptoms with a decrease in the sodium of the diet, 
then the pt is to have the office alerted. Anemia- continue with B12 injections 
per HH

                                                            2021

 

                                        Appointment: Franca Urbina 

WPtel:[phone]

                                        [address] [facility]

[place]                                              (30 min) Complex 2021

 

             Patient Education: Patient Medication Summary                      

     Completed    2021

 

                          Visit Plan:               Acute on chronic CHF - patie

nt refuses cardiology -continue with 

oxygen at all times- patient did not wear oxygen to appt- patient and sister 
verbalized understanding of plan. Did recommend follow up with cardiology due to
recurrent hospitalizations for CHF and patient continues to refuse Anemia- 
repeat labs today HTN -controlled- no change in medications today

                                                            2021

 

                                        Appointment: Franca Urbina 

WPtel:[phone]

                                        [address] [facility]

[place]                                              (30 min) Complex 2021

 

             Patient Education: Patient Medication Summary                      

     Completed    2021

 

             Patient Education: Patient Medication Summary                      

     Completed    2021

 

                          Visit Plan:               Chronic Congestive heart sheryl

lure - symptoms stable on lasix -pt has

refused referral to Cardiology - she is not interested in any other physician 
and will not go to another specialist per her report - pt to continue with 
chronic oxygen therapy. Hypertension - well controlled - continue with current 
medications, continue with no added salt diet. Pt has been encouraged to 
exercise daily. The pt has been advised to call the office if there are any 
acute concerns about change in blood pressure readings at home. Hypothyroidism -
pt with chronic hypothyroidism, continue with current medication, will monitor 
pt for signs or symptoms of lack of adequate supplementation. Pt is to continue 
with current dose of medication unless directed otherwise. Check labs at regular
intervals q 3 months or q 6 months based on previous levels of control.

                                                            2021

 

             Patient Education: Patient Medication Summary                      

     Completed    2021

 

                                        Appointment: Kimberly Souza 

WPtel:[phone]

                                        [facility] [place]

                                              (30 min) Complex 2020

 

                          Visit Plan:               Chronic Congestive heart sheryl

lure - symptoms stable on lasix - need 

to initiate referral to Dr. Mendoza - if not already done - pt to continue with 
chronic oxygen therapy - due to her weakness and need for easier to transport of
the oxygen from place to place, I have recommended that she needs a portable 
oxygen concentrator. She is too weak to transport large oxygen bottles from 
place to place and is too weak to move them up the stairs in her home. She has 
tripped over her oxygen tubing from the large oxygen concentrator in her house, 
therefore the extra long tubing is not safe to have in her home. She also has 
trouble getting the oxygen tanks moved from house to vehicles and around places 
if she goes shopping. Hypertension - uncontrolled - the patient's medications 
have been modified as documented in the visit note. The patient has been 
counseled to cut back on salt in diet for a no added salt diet, low fat diet, 
start an exercise program with low weight bearing exercises and higher aerobic 
activity for heart health. The patient is to check blood pressure readings as an
outpatient and either fax, call, or email the readings to the office next week 
for practitioner to review. The pt is to call for acute concerns. Increase 
losartan from 50mg daily to 50mg twice daily. Hypothyroidism - pt with chronic 
hypothyroidism, continue with current medication, will monitor pt for signs or 
symptoms of lack of adequate supplementation. Pt is to continue with current 
dose of medication unless directed otherwise. Check labs at regular intervals q 
3 months or q 6 months based on previous levels of control. Fasting labs to be 
done to be drawn by home health.

                                                            10/15/2020

 

                                        Appointment: Kimberly Souza 

WPtel:[phone]

                                        [address] [facility]

[place]                                              (15 min) Moderate 10/15/2020

 

             Patient Education: Patient Medication Summary                      

     Completed    10/15/2020

 

                          Visit Plan:               CHF - congestive heart failu

re - Pt has Chronic congestive heart 

failure - and is currently fairly well maintained on the current medications. 
Today there is no change in the treatment course. If symptoms worsen, increase 
edema or more that 2-3 pound weight gain over a week without improvement in the 
symptoms with a decrease in the sodium of the diet, then the pt is to have the 
office alerted. Dementia - Pt with slowly progressive pattern. I have discussed 
with pt and family the prognosis of this disease state and the need for the 
family to anticipate further decline with behavior changes. Continue with 
current plan of treatment.

                                                            2020

 

             Patient Education: Patient Medication Summary                      

     Completed    2020







Instructions





                                        Comment

 

                                        . Hypertension - well controlled - pop

nue with current medications, continue 

with no added salt diet.  Pt has been encouraged to exercise daily.

The pt has been advised to call the office if there are any acute concerns about
change in blood pressure readings at home.



Hypothyroidism - pt with chronic hypothyroidism, continue with current 
medication, will monitor pt for signs or symptoms of lack of adequate 
supplementation.  Pt is to continue with current dose of medication unless 
directed otherwise.  Check labs at regular intervals q 3 months or q 6 months 
based on previous levels of control.



B12 def -injection today 



 

                                        HAVE HOME HEALTH DRAW LABS WHEN THEY DO 

NEXT B12 INJECTION - CBC, CMP, TSH, FREE

T4, BNP, MAGNESIUM . Hypertension - well controlled - continue with current 
medications, continue with no added salt diet.  Pt has been encouraged to 
exercise daily.

The pt has been advised to call the office if there are any acute concerns about
change in blood pressure readings at home.



Anemia- recheck labs -continue b12 injections 



Edema - pt has been advised to elevate legs to prevent dependent edema, 
compression has been recommended to help to naturally decrease peripheral edema.
 Diuretic use has been discussed and pt has been instructed in appropriate use 
of such medication as necessary to further attempt to reduce peripheral edema.



Hypothyroidism -check levels with next labs 

 

                                        CONTINUE LOW SODIUM DIET



CONTINUE HOME HEALTH WITH [facility] B12 INJECTIONS



FOLLOW UP WITH DR OVALLE FOR PFTS AND CT SCAN

                                        . Hypertension - well controlled - pop

nue with current medications, continue 

with no added salt diet.  Pt has been encouraged to exercise daily.

The pt has been advised to call the office if there are any acute concerns about
change in blood pressure readings at home.



CHF - congestive heart failure - Pt has Chronic congestive heart failure - and 
is currently fairly well maintained on the current medications.  Today there is 
no change in the treatment course.  If symptoms worsen, increase edema or more 
that 2-3 pound weight gain over a week without improvement in the symptoms with 
a decrease in the sodium of the diet, then the pt is to have the office alerted.



Anemia- continue with B12 injections per  



 

                                        . Acute on chronic CHF - patient refuses

 cardiology -continue with oxygen at all

times- patient did not wear oxygen to appt- patient and sister verbalized 
understanding of plan. Did recommend follow up with cardiology due to recurrent 
hospitalizations for CHF and patient continues to refuse 



Anemia- repeat labs today 



HTN -controlled- no change in medications today 

 

                                        . Chronic Congestive heart failure - sym

ptoms stable on lasix  -pt has refused 

referral to Cardiology - she is not interested in any other physician and will 
not go to another specialist per her report - pt to continue with chronic oxygen
therapy.



Hypertension - well controlled - continue with current medications, continue 
with no added salt diet.  Pt has been encouraged to exercise daily.

The pt has been advised to call the office if there are any acute concerns about
change in blood pressure readings at home.



Hypothyroidism - pt with chronic hypothyroidism, continue with current 
medication, will monitor pt for signs or symptoms of lack of adequate 
supplementation.  Pt is to continue with current dose of medication unless 
directed otherwise.  Check labs at regular intervals q 3 months or q 6 months 
based on previous levels of control.



 

                                        . Chronic Congestive heart failure - sym

ptoms stable on lasix  - need to 

initiate referral to Dr. Mendoza - if not already done - pt to continue with 
chronic oxygen therapy - due to her weakness and need for easier to transport of
the oxygen from place to place, I have recommended that she needs a portable 
oxygen concentrator.  She is too weak to transport large oxygen bottles from 
place to place and is too weak to move them up the stairs in her home.  She has 
tripped over her oxygen tubing from the large oxygen concentrator in her house, 
therefore the extra long tubing is not safe to have in her home.  She also has 
trouble getting the oxygen tanks moved from house to vehicles and around places 
if she goes shopping.



Hypertension - uncontrolled - the patient's medications have been modified as 
documented in the visit note.  The patient has been counseled to cut back on 
salt in diet for a no added salt diet, low fat diet, start an exercise program 
with low weight bearing exercises and higher aerobic activity for heart health. 


The patient is to check blood pressure readings as an outpatient and either fax,
call, or email the readings to the office next week for practitioner to review.

The pt is to call for acute concerns.

Increase losartan from 50mg daily to 50mg twice daily.



Hypothyroidism - pt with chronic hypothyroidism, continue with current 
medication, will monitor pt for signs or symptoms of lack of adequate 
supplementation.  Pt is to continue with current dose of medication unless 
directed otherwise.  Check labs at regular intervals q 3 months or q 6 months 
based on previous levels of control.



Fasting labs to be done to be drawn by [facility].

 

                                        Will set up with [facility]



pt is to consider moving to assisted living



pt is to get a life alert bracelet. CHF - congestive heart failure - Pt has 
Chronic congestive heart failure - and is currently fairly well maintained on 
the current medications.  Today there is no change in the treatment course.  If 
symptoms worsen, increase edema or more that 2-3 pound weight gain over a week 
without improvement in the symptoms with a decrease in the sodium of the diet, 
then the pt is to have the office alerted.



Dementia - Pt with slowly progressive pattern.  I have discussed with pt and 
family the prognosis of this disease state and the need for the family to 
anticipate further decline with behavior changes.  Continue with current plan of
treatment.









Medical Equipment

No Medical Equipment data



Health Concerns Section

Health Concerns data not found



Goals Section

Goals data not found



Interventions Section

Interventions data not found



Health Status Evaluations/Outcomes Section

Health Status Evaluations/Outcomes data not found



Advance Directives

No Advance Directive data

## 2022-01-03 ENCOUNTER — HOSPITAL ENCOUNTER (EMERGENCY)
Dept: HOSPITAL 75 - ER | Age: 87
Discharge: HOME | End: 2022-01-03
Payer: MEDICARE

## 2022-01-03 VITALS — HEIGHT: 63.78 IN | BODY MASS INDEX: 33.42 KG/M2 | WEIGHT: 193.35 LBS

## 2022-01-03 VITALS — SYSTOLIC BLOOD PRESSURE: 167 MMHG | DIASTOLIC BLOOD PRESSURE: 94 MMHG
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
## 2022-01-03 DIAGNOSIS — S32.050A: Primary | ICD-10-CM

## 2022-01-03 DIAGNOSIS — Z79.890: ICD-10-CM

## 2022-01-03 DIAGNOSIS — E03.9: ICD-10-CM

## 2022-01-03 DIAGNOSIS — J44.9: ICD-10-CM

## 2022-01-03 DIAGNOSIS — Z79.82: ICD-10-CM

## 2022-01-03 DIAGNOSIS — Z79.899: ICD-10-CM

## 2022-01-03 DIAGNOSIS — I11.0: ICD-10-CM

## 2022-01-03 DIAGNOSIS — I50.9: ICD-10-CM

## 2022-01-03 PROCEDURE — 72131 CT LUMBAR SPINE W/O DYE: CPT

## 2022-01-03 NOTE — XMS REPORT
CCD document using C-CDA

                             Created on: 2021



Tierra Rizvi

External Reference #: [id number]

: 1934

Sex: Female



Demographics





                          Address                   [address]  [place]  [address]

 

                          Home Phone                [phone]

 

                          Preferred Language        Unknown

 

                          Marital Status            Unknown

 

                          Protestant Affiliation     Unknown

 

                          Race                      White

 

                          Ethnic Group              Not  or 





Author





                          Author                    Tierra Moreno

 

                          Organization              Kimberly Souza MD, [facility]

 

                          Address                   [address] [place]  [address]



 

                          Phone                     [phone]







Care Team Providers





                    Care Team Member Name Role                Phone

 

                    Kimberly Souza     PP                  Unavailable

 

                          CCM                       Unavailable







Summary Purpose

Interface Exchange



Insurance Providers





             Payer name   Policy type / Coverage type Covered party ID Effective

 Begin Date 

Effective End Date

 

             [employer] Medicare Part B Medicare Part B [id number]  Unknown      Unkno

wn

 

             [facility] Medicare Part B [id number] Unkno

wn      Unknown







Family history





Sister



                          Diagnosis                 Age At Onset

 

                          Arthritis                 Unknown

 

                          Hypertension              Unknown

 

                          Anemia                    Unknown

 

                          Diabetes mellitus Type 2  Unknown

 

                          Osteoporosis              Unknown







Son



                          Diagnosis                 Age At Onset

 

                          Myocardial infarction     Unknown

 

                          Hypertension              Unknown

 

                          Diabetes mellitus Type 2  Unknown







Father



                          Diagnosis                 Age At Onset

 

                          Cancer                    Unknown

 

                          Alcoholism                Unknown

 

                          kidney disease            Unknown







Mother



                          Diagnosis                 Age At Onset

 

                          Cancer                    Unknown







Brother



                          Diagnosis                 Age At Onset

 

                          Hypertension              Unknown

 

                          Cancer                    Unknown

 

                          Alcoholism                Unknown

 

                          Diabetes mellitus Type 2  Unknown

 

                          kidney disease            Unknown

 

                          Myocardial infarction     Unknown







Social History





                Social History Element Codes           Description     Effective

 Dates

 

                Marital status  Unknown                  2020

 

                Employment      Unknown         Retired         2020

 

                    Tobacco history     SNOMED CT: 6234076  Quit over 10 years a

go

                          2020

 

                Alcohol history SNOMED CT: 364640400 Never drinks alcohol 2020







Allergies, Adverse Reactions, Alerts





           Substance  Reaction   Codes      Entered Date Inactivated Date Status

 

           Penicillin rash,      Unknown    2020 No Inactive Date Active

 

             * OTHER REACTION - SEE ANSWER BOX Tetracycline- Rash Unknown      0

2020   No 

Inactive Date                           Active







Problems





                Condition       Codes           Effective Dates Condition Status

 

                spinal stenosis Unknown         10/25/2021      Active

 

                          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

ication ICD-10: M48.062

ICD-9: 724.03             10/25/2021                Active

 

                          CHF (congestive heart failure) ICD-10: I50.9

ICD-9: 428.0              2020                Active

 

                          On supplemental oxygen therapy ICD-10: Z99.81

ICD-9: V46.2              10/15/2020                Active

 

                          Panlobular emphysema      ICD-10: J43.1

ICD-9: 492.8              2021                Active

 

                          Atrophy of thyroid (acquired) ICD-10: E03.4

ICD-9: 244.8              10/15/2020                Active

 

                          Essential (primary) hypertension ICD-10: I10

ICD-9: 401.1              10/15/2020                Active

 

                          Vitamin B12 deficiency (dietary) anemia ICD-10: D51.8

ICD-9: 281.1              2021                Active

 

                          Localized edema           ICD-10: R60.0

ICD-9: 782.3              06/15/2021                Active

 

                          Acute on chronic diastolic congestive heart failure IC

D-10: I50.33

ICD-9: 428.33             2021                Active

 

                          Anemia                    ICD-10: D64.9

ICD-9: 285.9              2021                Active

 

                          Dementia without behavioral disturbance, unspecified d

ementia type ICD-10: 

F03.90

ICD-9: 294.20             2020                Active







Medications





          Medication Codes     Instructions Start Date Stop Date Status    Fill 

Instructions

 

                    hydrocodone 5 mg-acetaminophen 325 mg tablet RxNorm: 146728 

     Take 1 Tablet(s) 

Oral three times a day as needed for pain 2021      Active

           

 

                    cyanocobalamin (vit B-12) 1,000 mcg/mL injection solution Rx

Norm: 358995      1 

Milliliter(s) Injection monthly 2021      Active          

will need 

syringe/needle for monthly injection dx b12 deficiency

 

                levothyroxine 112 mcg tablet RxNorm: 551354  1 Tablet(s) Oral ev

berta day 

2021          2022          Active               

 

                    cyanocobalamin (vit B-12) 1,000 mcg/mL injection solution Rx

Norm: 695052      1 

Milliliter(s) Injection monthly 2021      Inactive        

will need 

syringe/needle for monthly  injection dx b12 deficiency

 

                    hydrocodone 5 mg-acetaminophen 325 mg tablet RxNorm: 772595 

     Take 1 Tablet(s) 

Oral three times a day as needed for pain 10/25/2021      10/25/2021      Inacti

ve         

 

                          albuterol sulfate 2.5 mg/3 mL (0.083 %) solution for n

ebulization RxNorm: 273153

             USE 1 VIAL IN NEBULIZER TWICE DAILY 10/19/2021   2021   Activ

e        

 

                          albuterol sulfate 2.5 mg/3 mL (0.083 %) solution for n

ebulization RxNorm: 904503

             USE 1 VIAL IN NEBULIZER TWICE DAILY 10/19/2021   10/19/2021   Inact

maryjo      

 

                levothyroxine 112 mcg tablet RxNorm: 917395  1 Tablet(s) Oral ev

berta day 

10/07/2021          10/07/2021          Inactive             

 

                levothyroxine 112 mcg tablet RxNorm: 109341  1 Tablet(s) Oral ev

berta day 

10/07/2021          10/07/2021          Inactive             

 

                levothyroxine 112 mcg tablet RxNorm: 954599  1 Tablet(s) Oral ev

berta day 

2021          10/07/2021          Inactive             

 

                    cyanocobalamin (vit B-12) 1,000 mcg/mL injection solution Rx

Norm: 721449      Take 

Milliliter(s) Injection 2021      Inactive         

 

                    cyanocobalamin (vit B-12) 1,000 mcg/mL injection solution Rx

Norm: 727260      Take 

Milliliter(s) Injection 2021      Inactive         

 

                          albuterol sulfate 2.5 mg/3 mL (0.083 %) solution for n

ebulization RxNorm: 575094

             1 Unit Dose Inhalation two times a day DX J43.1 2021   Inactive      

 

                losartan 50 mg tablet RxNorm: 177392  1 Tablet(s) Oral two times

 a day 2021          Active               

 

                          albuterol sulfate 2.5 mg/3 mL (0.083 %) solution for n

ebulization RxNorm: 473843

             1 Unit Dose Inhalation two times a day DX J43.1 2021   Inactive      

 

                          albuterol sulfate 2.5 mg/3 mL (0.083 %) solution for n

ebulization RxNorm: 628065

             1 Unit Dose Inhalation two times a day 2021   In

active      

 

                          albuterol sulfate 2.5 mg/3 mL (0.083 %) solution for n

ebulization RxNorm: 313317

             1 Unit Dose Inhalation two times a day 2021   In

active      

 

             Norvasc 5 mg tablet RxNorm: 602027 1 Tablet(s) Oral every day 2022                Active                     

 

             Norvasc 5 mg tablet RxNorm: 934260 1 Tablet(s) Oral every day 2021                Inactive                   

 

                    metoprolol succinate ER 25 mg tablet,extended release 24 hr 

RxNorm: 456609      1 

Tablet(s) Oral every day 2021      No Stop Date    Active           

 

                    Ventolin HFA 90 mcg/actuation aerosol inhaler RxNorm: 660476

      1-2 Puff(s) 

Inhalation Every 4 hrs as needed 2021      No Stop Date    Active         

  

 

             furosemide 20 mg tablet RxNorm: 423368 1 Tablet(s) Oral every day 0

2021   No 

Stop Date                 Active                     

 

                    furosemide 40 mg tablet RxNorm: 624368      1 Tablet(s) Oral

 as directed 40mg BID x 5

days then 40mg in am and 20mg afternoon thereafter 2020          

Inactive                                give qty sufficient

 

                    potassium chloride ER 10 mEq tablet,extended release RxNorm:

 752980      1 Tablet(s) 

Oral as directed 1 tab bid x 5 days then resume daily 2020          

Inactive                                qty sufficient

 

                    potassium bicarbonate-citric acid 10 mEq effervescent tablet

 RxNorm: 8480551     1 

Tablet(s) Oral every day 10/15/2020      2020      Inactive         

 

                levothyroxine 125 mcg tablet RxNorm: 450449  1 Tablet(s) Oral ev

berta day 

10/15/2020          2021          Inactive             

 

                losartan 50 mg tablet RxNorm: 258327  1 Tablet(s) Oral two times

 a day 10/15/2020

                    2021          Inactive             

 

             furosemide 40 mg tablet RxNorm: 256173 1 Tablet(s) Oral every day 1

0/15/2020   

2020                Inactive                   

 

             Zyrtec 10 mg tablet RxNorm: 3442203 1 Tablet(s) Oral every day 10/0

2020   

2021                Inactive                   

 

                levothyroxine 125 mcg tablet RxNorm: 827980  1 Tablet(s) Oral ev

berta day 

10/05/2020          10/14/2020          Inactive             

 

             Zyrtec 10 mg tablet RxNorm: 7449426 1 Tablet(s) Oral every day 10/0

2020   

10/04/2020                Inactive                   

 

                aspirin 81 mg tablet,delayed release RxNorm: 751985  1 Tablet(s)

 Oral every day 

2020          No Stop Date        Active               

 

             Vitamin C 250 mg tablet RxNorm: 476869 1 Tablet(s) Oral every day 0

2020   No 

Stop Date                 Active                     

 

                Vitamin D3 50 mcg (2,000 unit) tablet RxNorm: 879387  1 Tablet(s

) Oral every day 

2020          No Stop Date        Active               

 

                levothyroxine 88 mcg tablet RxNorm: 545313  1 Tablet(s) Oral ledy

ry day 2020

                    10/04/2020          Inactive             

 

             losartan 50 mg tablet RxNorm: 491282 1 Tablet(s) Oral every day 09/

   

10/14/2020                Inactive                   

 

             furosemide 40 mg tablet RxNorm: 734955 1 Tablet(s) Oral every day 0

2020   

10/14/2020                Inactive                   

 

                    potassium bicarbonate-citric acid 10 mEq effervescent tablet

 RxNorm: 6731783     1 

Tablet(s) Oral every day 2020      10/14/2020      Inactive         

 

          Women's Multivitamin oral RxNorm:   oral      2020           Act

maryjo     

 

          Calcium 600 oral RxNorm: 1897 oral      2020           Active   

  

 

          albuterol sulfate inhalation RxNorm: 991656 inhalation 2020     

      Active     







Medication Administered





             Medication   Codes        Instructions Start Date   Status

 

                    cyanocobalamin (vit B-12) 1,000 mcg/mL injection solution Rx

Norm: 705353      

Milliliter                2021                No longer Active

 

                    cyanocobalamin (vit B-12) 1,000 mcg/mL injection solution Rx

Norm: 277560      

Milliliter                2021                No longer Active







Immunizations





             Vaccine      Codes        Dose         Date         Status

 

             Covid-19     CVX: 207                  04/10/2021    

 

             Covid-19     CVX: 207                  2021    

 

             Pneumococcal (Adult) Unknown                   2019    

 

             Zoster       CVX: 121                  2017    







Results





          Observation Observation Code Item      Item Code Result    Date      S

ervice Location

 

          Cbc With Differential Ord2      WBC                 8.64 K/ul 20

21 Unknown

 

          Cbc With Differential Ord2      RBC                 3.76 M/ul 20

21 Unknown

 

          Cbc With Differential Ord2      HGB                 9.9 g/dl  20

21 Unknown

 

          Cbc With Differential Ord2      Neut%               61.9 %    20

21 Unknown

 

          Cbc With Differential Ord2      HCT                 33.4 %    20

21 Unknown

 

          Cbc With Differential Ord2      MCV                 88.8 fl   20

21 Unknown

 

          Cbc With Differential Ord2      Lymph%              28.1 %    20

21 Unknown

 

          Cbc With Differential Ord2      Mono%               8.0 %     20

21 Unknown

 

          Cbc With Differential Ord2      MCH                 26.3 pg   20

21 Unknown

 

          Cbc With Differential Ord2      MCHC                29.6 pg   20

21 Unknown

 

          Cbc With Differential Ord2      Eos%                1.9 %     20

21 Unknown

 

          Cbc With Differential Ord2      Baso%               0.1 %     20

21 Unknown

 

          Cbc With Differential Ord2      PLT                 332 K/ul  20

21 Unknown

 

          Cbc With Differential Ord2      RDW                 15.2 %    20

21 Unknown

 

          Cbc With Differential Ord2      Neut ABS#           5.35 K/ul 20

21 Unknown

 

          Cbc With Differential Ord2      Lymph ABS#           2.43 K/ul 

021 Unknown

 

          Cbc With Differential Ord2      Mono ABS#           0.7 K/ul  20

21 Unknown

 

          Cbc With Differential Ord2      Eos ABS#            0.2 K/ul  20

21 Unknown

 

          Cbc With Differential Ord2      Baso ABS#           0.0 K/ul  20

21 Unknown

 

          Tsh       Ord6      TSH (3rd IS)           0.21 uIU/mL 2021 Unkn

own

 

          Comp Metabolic Vqq526    NA                  141 mEq/L 2021 Unkn

own

 

          Comp Metabolic Udj917    K                   4.1 mEq/L 2021 Unkn

own

 

          Comp Metabolic Qmf903    CL                  104 mEq/L 2021 Unkn

own

 

          Comp Metabolic Vfo392    CO2                 28.0 mEq/L 2021 Unk

nown

 

          Comp Metabolic Yev293    ANION GAP           13        2021 Unkn

own

 

          Comp Metabolic Pze817    GLUCOSE             78 mg/dL  2021 Unkn

own

 

          Comp Metabolic Oft955    Creat               0.8 mg/dL 2021 Unkn

own

 

          Comp Metabolic Giu973    eGFR                71 ml/min/1.73m2 20

21 Unknown

 

          Comp Metabolic Vmc272    BUN                 25 mg/dL  2021 Unkn

own

 

          Comp Metabolic Ahn860    B/C Ratio           30.9 Ratio 2021 Unk

nown

 

          Comp Metabolic Iio198    CALCIUM             8.7 mg/dL 2021 Unkn

own

 

          Comp Metabolic Dgp780    ALK PHOS            61 U/L    2021 Unkn

own

 

          Comp Metabolic Iyh010    AST(SGOT)           17 U/L    2021 Unkn

own

 

          Comp Metabolic Efp373    ALT(SGPT)           10 U/L    2021 Unkn

own

 

          Comp Metabolic Jfu332    BILI T              0.6 mg/dL 2021 Unkn

own

 

          Comp Metabolic Dbt742    ALBUMIN             3.7 g/dL  2021 Unkn

own

 

          Comp Metabolic Igk332    TPRO                6.1 g/dL  2021 Unkn

own

 

          Comp Metabolic Tzs690    GLOB                2.4 g/dL  2021 Unkn

own

 

          Comp Metabolic Gas851    A/G Ratio           1.6 Ratio 2021 Unkn

own

 

          Comp Metabolic Sya031    Osmo                285 mOsmo 2021 Unkn

own

 

          B12       Msy349    B12                 >1500.00 pg/ml 2021 Unkn

own

 

          Free T4   Jfr047    FREE T4             1.23 ng/dL 2021 Unknown

 

          Tibc      Ord40     Iron                32 ug/dl  2021 Unknown

 

          Tibc      Ord40     UIBC                402 ug/dL 2021 Unknown

 

          Tibc      Ord40     TIBC                434 ug/dL 2021 Unknown

 

          Tibc      Ord40     Fe-%Sat             7.4 %     2021 Unknown

 

          Ferritin  Ord22     FERRITIN            27.6 ng/mL 2021 Unknown

 

          B12       Knq604    B12                 187.00 pg/ml 2021 Unknow

n

 

          Comp Metabolic Lac148    NA                  139 mEq/L 2021 Unkn

own

 

          Comp Metabolic Owr101    K                   4.1 mEq/L 2021 Unkn

own

 

          Comp Metabolic Ktb166    CL                  103 mEq/L 2021 Unkn

own

 

          Comp Metabolic Uhu094    CO2                 27.0 mEq/L 2021 Unk

nown

 

          Comp Metabolic Nke129    ANION GAP           13        2021 Unkn

own

 

          Comp Metabolic Mgh226    GLUCOSE             79 mg/dL  2021 Unkn

own

 

          Comp Metabolic Pra382    Creat               0.7 mg/dL 2021 Unkn

own

 

          Comp Metabolic Lvt458    eGFR                80 ml/min/1.73m2 20

21 Unknown

 

          Comp Metabolic Jao178    BUN                 21 mg/dL  2021 Unkn

own

 

          Comp Metabolic Ejy785    B/C Ratio           28.8 Ratio 2021 Unk

nown

 

          Comp Metabolic Bga012    CALCIUM             8.7 mg/dL 2021 Unkn

own

 

          Comp Metabolic Jcj500    ALK PHOS            74 U/L    2021 Unkn

own

 

          Comp Metabolic Wkx655    AST(SGOT)           15 U/L    2021 Unkn

own

 

          Comp Metabolic Smi505    ALT(SGPT)           17 U/L    2021 Unkn

own

 

          Comp Metabolic Fre811    BILI T              0.4 mg/dL 2021 Unkn

own

 

          Comp Metabolic Zbj486    ALBUMIN             3.5 g/dL  2021 Unkn

own

 

          Comp Metabolic Dwk970    TPRO                5.9 g/dL  2021 Unkn

own

 

          Comp Metabolic Gfy524    GLOB                2.4 g/dL  2021 Unkn

own

 

          Comp Metabolic Cku190    A/G Ratio           1.4 Ratio 2021 Unkn

own

 

          Comp Metabolic Fro342    Osmo                279 mOsmo 2021 Unkn

own

 

          Cbc With Differential Ord2      WBC                 7.63 K/ul 20

21 Unknown

 

          Cbc With Differential Ord2      RBC                 3.38 M/ul 20

21 Unknown

 

          Cbc With Differential Ord2      HGB                 9.4 g/dl  20

21 Unknown

 

          Cbc With Differential Ord2      Neut%               52.6 %    20

21 Unknown

 

          Cbc With Differential Ord2      HCT                 31.5 %    20

21 Unknown

 

          Cbc With Differential Ord2      MCV                 93.2 fl   20

21 Unknown

 

          Cbc With Differential Ord2      Lymph%              33.8 %    20

21 Unknown

 

          Cbc With Differential Ord2      MCH                 27.8 pg   20

21 Unknown

 

          Cbc With Differential Ord2      Mono%               7.9 %     20

21 Unknown

 

          Cbc With Differential Ord2      MCHC                29.8 pg   20

21 Unknown

 

          Cbc With Differential Ord2      Eos%                5.4 %     20

21 Unknown

 

          Cbc With Differential Ord2      Baso%               0.3 %     20

21 Unknown

 

          Cbc With Differential Ord2      PLT                 329 K/ul  20

21 Unknown

 

          Cbc With Differential Ord2      Neut ABS#           4.02 K/ul 20

21 Unknown

 

          Cbc With Differential Ord2      RDW                 15.2 %    20

21 Unknown

 

          Cbc With Differential Ord2      Lymph ABS#           2.58 K/ul 

021 Unknown

 

          Cbc With Differential Ord2      Mono ABS#           0.6 K/ul  20

21 Unknown

 

          Cbc With Differential Ord2      Eos ABS#            0.4 K/ul  20

21 Unknown

 

          Cbc With Differential Ord2      Baso ABS#           0.0 K/ul  20

21 Unknown







Procedures





                    Procedure           Codes               Date

 

                    THER/PROPH/DIAG INJ SC/IM CPT-4: 05104        2021

 

                    VITAMIN B12 INJECTION 1000 mcg CPT-4:         

 

                    THER/PROPH/DIAG INJ SC/IM CPT-4: 50991        2021







Vital Signs





                          Date                      Vital

 

                2021      SpO2: 96%       SpO2: 94%       SpO2: 87%

 

                2021      Blood Pressure 1: 138/82 Code: 8480-6 BMI: 33.5 

Code: 44278-6 Heart 

Rate 1: 74 bpm      Height: 5'3" Code: 8302-2 SpO2: 98%           Temperature: 3

6.2 (C) / 97.1 

(F)                                     Weight: 189 lbs  Code: 06074-8

 

                06/15/2021      Blood Pressure 1: 134/80 Code: 8480-6 Heart Rate

 1: 77 bpm Height: 

5'3" Code: 8302-2         SpO2: 93%                 Temperature: 36.3 (C) / 97.3

 (F)

 

                2021      Blood Pressure 1: 136/88 Code: 8480-6 Heart Rate

 1: 60 bpm Height:  

Code: 8302-2        SpO2: 93%           Temperature: 36.3 (C) / 97.3 (F) Weight:

 174 lbs  Code: 

35870-7

 

                2021      Blood Pressure 1: 164/92 Code: 8480-6 BMI: 31.4 

Code: 14060-4 Heart 

Rate 1: 67 bpm      Height: 5'3" Code: 8302-2 SpO2: 94%           Temperature: 3

6.2 (C) / 97.1 

(F)                                     Weight: 177 lbs  Code: 79827-7

 

                2021      Blood Pressure 1: 134/72 Code: 8480-6 BMI: 30.8 

Code: 54864-9 Heart 

Rate 1: 74 bpm  Height: 5'3" Code: 8302-2 Respiratory Rate: 18 bpm SpO2: 95%    

   

Temperature: 36.3 (C) / 97.4 (F)        Weight: 174 lbs  Code: 46876-3

 

                10/15/2020      Blood Pressure 1: 202/94 Code: 8480-6 BMI: 30.6 

Code: 67928-7 Heart 

Rate 1: 73 bpm  Height: 5'3" Code: 8302-2 Respiratory Rate: 17 bpm SpO2: 91%    

   

Temperature: 36.8 (C) / 98.2 (F)        Weight: 173 lbs  Code: 08512-0

 

                2020      Blood Pressure 1: 144/90 Code: 8480-6 BMI: 29.4 

Code: 81299-3 Heart 

Rate 1: 72 bpm      Height: 5'3" Code: 8302-2 SpO2: 98%           Temperature: 3

6.7 (C) / 98.0 

(F)                                     Weight: 166 lbs  Code: 11664-4







Functional Status

No Functional Status data



Reason For Visit





                    Reason For Visit    Effective Dates     Notes

 

                    hypertension        2021           

 

                    hypertension        06/15/2021           

 

                    hypertension        2021           

 

                    Hospital Follow Up  2021           

 

                    shortness of breath 2021           

 

                    shortness of breath 10/15/2020           

 

                    Hospital Follow Up  2020           







Encounters





             Encounter    Performer    Location     Codes        Date

 

                                         EST. PATIENT, LEVEL I

Diagnosis: CHF (congestive heart failure)[ICD10: I50.9]

Diagnosis: On supplemental oxygen therapy[ICD10: Z99.81]

Diagnosis: Panlobular emphysema[ICD10: J43.1] Kimberly rhodes MD, 

[facility]                       CPT-4: 38556              2021

 

                                        [phone] EST. PATIENT, LEVEL IV

Diagnosis: Vitamin B12 deficiency (dietary) anemia[ICD10: D51.8]

Diagnosis: Essential (primary) hypertension[ICD10: I10]

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

 

                                        [phone] EST. PATIENT, LEVEL IV

Diagnosis: Essential (primary) hypertension[ICD10: I10]

Diagnosis: Vitamin B12 deficiency (dietary) anemia[ICD10: D51.8]

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

Diagnosis: Localized edema[ICD10: R60.0] Franca mejia MD, [facility]

                          CPT-4: 73768              06/15/2021

 

                                        [phone] EST. PATIENT, LEVEL IV

Diagnosis: CHF (congestive heart failure)[ICD10: I50.9]

Diagnosis: Essential (primary) hypertension[ICD10: I10]

Diagnosis: Vitamin B12 deficiency (dietary) anemia[ICD10: D51.8] Franca Souza MD, [facility] CPT-4: 27632        2021

 

                                        [phone] EST. PATIENT, LEVEL IV

Diagnosis: Acute on chronic diastolic congestive heart failure[ICD10: I50.33]

Diagnosis: Anemia[ICD10: D64.9]

Diagnosis: Essential (primary) hypertension[ICD10: I10] Franca Souza MD, [facility]          CPT-4: 79951              2021

 

                                        [phone] [phone] EST. PATIENT, LEVEL IV

Diagnosis: Essential (primary) hypertension[ICD10: I10]

Diagnosis: CHF (congestive heart failure)[ICD10: I50.9]

Diagnosis: Atrophy of thyroid (acquired)[ICD10: E03.4] Kimberly Souza MD, [facility]          CPT-4: 16683              2021

 

                                        [phone] EST. PATIENT, LEVEL IV

Diagnosis: Essential (primary) hypertension[ICD10: I10]

Diagnosis: CHF (congestive heart failure)[ICD10: I50.9]

Diagnosis: On supplemental oxygen therapy[ICD10: Z99.81]

Diagnosis: Atrophy of thyroid (acquired)[ICD10: E03.4] Kimberly Souza MD, [facility]          CPT-4: 70995              10/15/2020

 

                                        OFFICE  VISIT, NEW - LEVEL 3

Diagnosis: CHF (congestive heart failure)[ICD10: I50.9]

Diagnosis: Dementia without behavioral disturbance, unspecified dementia 
type[ICD10: F03.90] Lynn Souza MD, [facility] CPT-4: 28100    







Plan of Care





             Planned Activity Notes        Codes        Status       Date

 

             Patient Education: Patient Medication Summary                      

     Completed    10/25/2021

 

             Appointment:                            Nurse Visit  2021

 

             Patient Education: Patient Medication Summary                      

     Completed    2021

 

                          Visit Plan:               Hypertension - well controll

ed - continue with current medications,

continue with no added salt diet. Pt has been encouraged to exercise daily. The 
pt has been advised to call the office if there are any acute concerns about 
change in blood pressure readings at home. Hypothyroidism - pt with chronic 
hypothyroidism, continue with current medication, will monitor pt for signs or 
symptoms of lack of adequate supplementation. Pt is to continue with current 
dose of medication unless directed otherwise. Check labs at regular intervals q 
3 months or q 6 months based on previous levels of control. B12 def -injection 
today

                                                            2021

 

                                        Appointment: Franca Ubrina 

WPtel:[phone]

                                        [address] [facility]

                                              (30 min) Complex 2021

 

             Patient Education: Patient Medication Summary                      

     Completed    2021

 

             Appointment:                            Injection    2021

 

             Patient Education: Patient Medication Summary                      

     Completed    2021

 

             Patient Education: Patient Medication Summary                      

     Completed    2021

 

             Care Plan: Cbc With Differential                           Pending 

     2021

 

             Care Plan: Comp Metabolic                           Pending      

 

             Care Plan: Tsh                           Pending      2021

 

             Care Plan: Magnesium                           Pending      

021

 

             Care Plan: Free T4                           Pending      

 

                          Visit Plan:               Hypertension - well controll

ed - continue with current medications,

continue with no added salt diet. Pt has been encouraged to exercise daily. The 
pt has been advised to call the office if there are any acute concerns about 
change in blood pressure readings at home. Anemia- recheck labs -continue b12 
injections Edema - pt has been advised to elevate legs to prevent dependent 
edema, compression has been recommended to help to naturally decrease peripheral
edema. Diuretic use has been discussed and pt has been instructed in appropriate
use of such medication as necessary to further attempt to reduce peripheral 
edema. Hypothyroidism -check levels with next labs

                                                            06/15/2021

 

                                        Appointment: Franca Urbina 

WPtel:[phone] [address] [facility]

                                              (30 min) Complex 06/15/2021

 

             Patient Education: Patient Medication Summary                      

     Completed    06/15/2021

 

                                        Appointment: Franca Urbina 

WPtel:[phone]

                                        [address] [facility]

                                              (30 min) Complex 2021

 

                                        Appointment: LibbyMachoy 

WPtel:[phone]

                                        [address] [facility]

                                              (15 min) Moderate 04/15/2021

 

                          Visit Plan:               Hypertension - well controll

ed - continue with current medications,

continue with no added salt diet. Pt has been encouraged to exercise daily. The 
pt has been advised to call the office if there are any acute concerns about 
change in blood pressure readings at home. CHF - congestive heart failure - Pt 
has Chronic congestive heart failure - and is currently fairly well maintained 
on the current medications. Today there is no change in the treatment course. If
symptoms worsen, increase edema or more that 2-3 pound weight gain over a week 
without improvement in the symptoms with a decrease in the sodium of the diet, 
then the pt is to have the office alerted. Anemia- continue with B12 injections 
per 

                                                            2021

 

                                        Appointment: Franca Urbina 

WPtel:[phone]

                                        [address] [facility]

                                              (30 min) Complex 2021

 

             Patient Education: Patient Medication Summary                      

     Completed    2021

 

                          Visit Plan:               Acute on chronic CHF - patie

nt refuses cardiology -continue with 

oxygen at all times- patient did not wear oxygen to appt- patient and sister 
verbalized understanding of plan. Did recommend follow up with cardiology due to
recurrent hospitalizations for CHF and patient continues to refuse Anemia- 
repeat labs today HTN -controlled- no change in medications today

                                                            2021

 

                                        Appointment: Franca Urbina 

WPtel:[phone]

                                        [address] [facility]

                                              (30 min) Complex 2021

 

             Patient Education: Patient Medication Summary                      

     Completed    2021

 

             Patient Education: Patient Medication Summary                      

     Completed    2021

 

                          Visit Plan:               Chronic Congestive heart sheryl

lure - symptoms stable on lasix -pt has

refused referral to Cardiology - she is not interested in any other physician 
and will not go to another specialist per her report - pt to continue with 
chronic oxygen therapy. Hypertension - well controlled - continue with current 
medications, continue with no added salt diet. Pt has been encouraged to 
exercise daily. The pt has been advised to call the office if there are any 
acute concerns about change in blood pressure readings at home. Hypothyroidism -
pt with chronic hypothyroidism, continue with current medication, will monitor 
pt for signs or symptoms of lack of adequate supplementation. Pt is to continue 
with current dose of medication unless directed otherwise. Check labs at regular
intervals q 3 months or q 6 months based on previous levels of control.

                                                            2021

 

             Patient Education: Patient Medication Summary                      

     Completed    2021

 

                                        Appointment: Kimberly Souza 

WPtel:[phone]

                                        [facility]

                                              (30 min) Complex 2020

 

                          Visit Plan:               Chronic Congestive heart sheryl

lure - symptoms stable on lasix - need 

to initiate referral to Dr. Mendoza - if not already done - pt to continue with 
chronic oxygen therapy - due to her weakness and need for easier to transport of
the oxygen from place to place, I have recommended that she needs a portable 
oxygen concentrator. She is too weak to transport large oxygen bottles from 
place to place and is too weak to move them up the stairs in her home. She has 
tripped over her oxygen tubing from the large oxygen concentrator in her house, 
therefore the extra long tubing is not safe to have in her home. She also has 
trouble getting the oxygen tanks moved from house to vehicles and around places 
if she goes shopping. Hypertension - uncontrolled - the patient's medications 
have been modified as documented in the visit note. The patient has been 
counseled to cut back on salt in diet for a no added salt diet, low fat diet, 
start an exercise program with low weight bearing exercises and higher aerobic 
activity for heart health. The patient is to check blood pressure readings as an
outpatient and either fax, call, or email the readings to the office next week 
for practitioner to review. The pt is to call for acute concerns. Increase 
losartan from 50mg daily to 50mg twice daily. Hypothyroidism - pt with chronic 
hypothyroidism, continue with current medication, will monitor pt for signs or 
symptoms of lack of adequate supplementation. Pt is to continue with current 
dose of medication unless directed otherwise. Check labs at regular intervals q 
3 months or q 6 months based on previous levels of control. Fasting labs to be 
done to be drawn by home health.

                                                            10/15/2020

 

                                        Appointment: Kimberly Souza 

WPtel:[phone]

                                        [facility]

[place]                                              (15 min) Moderate 10/15/2020

 

             Patient Education: Patient Medication Summary                      

     Completed    10/15/2020

 

                          Visit Plan:               CHF - congestive heart failu

re - Pt has Chronic congestive heart 

failure - and is currently fairly well maintained on the current medications. 
Today there is no change in the treatment course. If symptoms worsen, increase 
edema or more that 2-3 pound weight gain over a week without improvement in the 
symptoms with a decrease in the sodium of the diet, then the pt is to have the 
office alerted. Dementia - Pt with slowly progressive pattern. I have discussed 
with pt and family the prognosis of this disease state and the need for the 
family to anticipate further decline with behavior changes. Continue with 
current plan of treatment.

                                                            2020

 

             Patient Education: Patient Medication Summary                      

     Completed    2020







Instructions





                          Comment                   Date

 

                                        . Hypertension - well controlled - pop

nue with current medications, continue 

with no added salt diet.  Pt has been encouraged to exercise daily.

The pt has been advised to call the office if there are any acute concerns about
change in blood pressure readings at home.



Hypothyroidism - pt with chronic hypothyroidism, continue with current 
medication, will monitor pt for signs or symptoms of lack of adequate 
supplementation.  Pt is to continue with current dose of medication unless 
directed otherwise.  Check labs at regular intervals q 3 months or q 6 months 
based on previous levels of control.



B12 def -injection today 

                                        2021

 

                                        HAVE HOME HEALTH DRAW LABS WHEN THEY DO 

NEXT B12 INJECTION - CBC, CMP, TSH, FREE

T4, BNP, MAGNESIUM . Hypertension - well controlled - continue with current 
medications, continue with no added salt diet.  Pt has been encouraged to 
exercise daily.

The pt has been advised to call the office if there are any acute concerns about
change in blood pressure readings at home.



Anemia- recheck labs -continue b12 injections 



Edema - pt has been advised to elevate legs to prevent dependent edema, 
compression has been recommended to help to naturally decrease peripheral edema.
 Diuretic use has been discussed and pt has been instructed in appropriate use 
of such medication as necessary to further attempt to reduce peripheral edema.



Hypothyroidism -check levels with next labs  06/15/2021

 

                                        CONTINUE LOW SODIUM DIET



CONTINUE HOME HEALTH WITH [facility] B12 INJECTIONS



FOLLOW UP WITH DR OVALLE FOR PFTS AND CT SCAN

                                        . Hypertension - well controlled - pop

nue with current medications, continue 

with no added salt diet.  Pt has been encouraged to exercise daily.

The pt has been advised to call the office if there are any acute concerns about
change in blood pressure readings at home.



CHF - congestive heart failure - Pt has Chronic congestive heart failure - and 
is currently fairly well maintained on the current medications.  Today there is 
no change in the treatment course.  If symptoms worsen, increase edema or more 
that 2-3 pound weight gain over a week without improvement in the symptoms with 
a decrease in the sodium of the diet, then the pt is to have the office alerted.



Anemia- continue with B12 injections per  

                                        2021

 

                                        . Acute on chronic CHF - patient refuses

 cardiology -continue with oxygen at all

times- patient did not wear oxygen to appt- patient and sister verbalized 
understanding of plan. Did recommend follow up with cardiology due to recurrent 
hospitalizations for CHF and patient continues to refuse 



Anemia- repeat labs today 



HTN -controlled- no change in medications today  2021

 

                                        . Chronic Congestive heart failure - sym

ptoms stable on lasix  -pt has refused 

referral to Cardiology - she is not interested in any other physician and will 
not go to another specialist per her report - pt to continue with chronic oxygen
therapy.



Hypertension - well controlled - continue with current medications, continue 
with no added salt diet.  Pt has been encouraged to exercise daily.

The pt has been advised to call the office if there are any acute concerns about
change in blood pressure readings at home.



Hypothyroidism - pt with chronic hypothyroidism, continue with current 
medication, will monitor pt for signs or symptoms of lack of adequate 
supplementation.  Pt is to continue with current dose of medication unless 
directed otherwise.  Check labs at regular intervals q 3 months or q 6 months 
based on previous levels of control.

                                        2021

 

                                        . Chronic Congestive heart failure - sym

ptoms stable on lasix  - need to 

initiate referral to Dr. Mendoza - if not already done - pt to continue with 
chronic oxygen therapy - due to her weakness and need for easier to transport of
the oxygen from place to place, I have recommended that she needs a portable 
oxygen concentrator.  She is too weak to transport large oxygen bottles from 
place to place and is too weak to move them up the stairs in her home.  She has 
tripped over her oxygen tubing from the large oxygen concentrator in her house, 
therefore the extra long tubing is not safe to have in her home.  She also has 
trouble getting the oxygen tanks moved from house to vehicles and around places 
if she goes shopping.



Hypertension - uncontrolled - the patient's medications have been modified as 
documented in the visit note.  The patient has been counseled to cut back on 
salt in diet for a no added salt diet, low fat diet, start an exercise program 
with low weight bearing exercises and higher aerobic activity for heart health. 


The patient is to check blood pressure readings as an outpatient and either fax,
call, or email the readings to the office next week for practitioner to review.

The pt is to call for acute concerns.

Increase losartan from 50mg daily to 50mg twice daily.



Hypothyroidism - pt with chronic hypothyroidism, continue with current 
medication, will monitor pt for signs or symptoms of lack of adequate 
supplementation.  Pt is to continue with current dose of medication unless 
directed otherwise.  Check labs at regular intervals q 3 months or q 6 months 
based on previous levels of control.



Fasting labs to be done to be drawn by [facility]. 10/15/2020

 

                                        Will set up with [facility]



pt is to consider moving to assisted living



pt is to get a life alert bracelet. CHF - congestive heart failure - Pt has 
Chronic congestive heart failure - and is currently fairly well maintained on 
the current medications.  Today there is no change in the treatment course.  If 
symptoms worsen, increase edema or more that 2-3 pound weight gain over a week 
without improvement in the symptoms with a decrease in the sodium of the diet, 
then the pt is to have the office alerted.



Dementia - Pt with slowly progressive pattern.  I have discussed with pt and 
family the prognosis of this disease state and the need for the family to 
anticipate further decline with behavior changes.  Continue with current plan of
treatment.

                                        2020







Medical Equipment

No Medical Equipment data



Health Concerns Section

Health Concerns data not found



Goals Section

Goals data not found



Interventions Section

Interventions data not found



Health Status Evaluations/Outcomes Section

Health Status Evaluations/Outcomes data not found



Advance Directives

No Advance Directive data

## 2022-01-03 NOTE — XMS REPORT
CCD document using C-CDA

                             Created on: 2021



Tierra Rizvi

External Reference #: [id number]

: 1934

Sex: Female



Demographics





                          Address                   [address]  [place]  [address]

 

                          Home Phone                [phone]

 

                          Preferred Language        Unknown

 

                          Marital Status            Unknown

 

                          Quaker Affiliation     Unknown

 

                          Race                      White

 

                          Ethnic Group              Not  or 





Author





                          Author                    Tierra Moreno

 

                          Organization              Kimberly Souza MD, [facility]

 

                          Address                   [address] [place]  [address]



 

                          Phone                     [phone]







Care Team Providers





                    Care Team Member Name Role                Phone

 

                    Kimberly Souza     PP                  Unavailable

 

                          CCM                       Unavailable







Summary Purpose

Interface Exchange



Insurance Providers





             Payer name   Policy type / Coverage type Covered party ID Effective

 Begin Date 

Effective End Date

 

             [employer] Medicare Part B Medicare Part B [id number]  Unknown      Unkno

wn

 

             [facility] Medicare Part B [id number] Unkno

wn      Unknown







Family history





Sister



                          Diagnosis                 Age At Onset

 

                          Arthritis                 Unknown

 

                          Hypertension              Unknown

 

                          Anemia                    Unknown

 

                          Diabetes mellitus Type 2  Unknown

 

                          Osteoporosis              Unknown







Son



                          Diagnosis                 Age At Onset

 

                          Myocardial infarction     Unknown

 

                          Hypertension              Unknown

 

                          Diabetes mellitus Type 2  Unknown







Father



                          Diagnosis                 Age At Onset

 

                          Cancer                    Unknown

 

                          Alcoholism                Unknown

 

                          kidney disease            Unknown







Mother



                          Diagnosis                 Age At Onset

 

                          Cancer                    Unknown







Brother



                          Diagnosis                 Age At Onset

 

                          Hypertension              Unknown

 

                          Cancer                    Unknown

 

                          Alcoholism                Unknown

 

                          Diabetes mellitus Type 2  Unknown

 

                          kidney disease            Unknown

 

                          Myocardial infarction     Unknown







Social History





                Social History Element Codes           Description     Effective

 Dates

 

                Marital status  Unknown                  2020

 

                Employment      Unknown         Retired         2020

 

                    Tobacco history     SNOMED CT: 4901996  Quit over 10 years a

go

                          2020

 

                Alcohol history SNOMED CT: 821968879 Never drinks alcohol 2020







Allergies, Adverse Reactions, Alerts





           Substance  Reaction   Codes      Entered Date Inactivated Date Status

 

           Penicillin rash,      Unknown    2020 No Inactive Date Active

 

             * OTHER REACTION - SEE ANSWER BOX Tetracycline- Rash Unknown      0

2020   No 

Inactive Date                           Active







Problems





                Condition       Codes           Effective Dates Condition Status

 

                          Anemia                    ICD-10: D64.9

ICD-9: 285.9              2021                Active

 

                spinal stenosis Unknown         10/25/2021      Active

 

                          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

ication ICD-10: M48.062

ICD-9: 724.03             10/25/2021                Active

 

                          CHF (congestive heart failure) ICD-10: I50.9

ICD-9: 428.0              2020                Active

 

                          On supplemental oxygen therapy ICD-10: Z99.81

ICD-9: V46.2              10/15/2020                Active

 

                          Panlobular emphysema      ICD-10: J43.1

ICD-9: 492.8              2021                Active

 

                          Atrophy of thyroid (acquired) ICD-10: E03.4

ICD-9: 244.8              10/15/2020                Active

 

                          Essential (primary) hypertension ICD-10: I10

ICD-9: 401.1              10/15/2020                Active

 

                          Vitamin B12 deficiency (dietary) anemia ICD-10: D51.8

ICD-9: 281.1              2021                Active

 

                          Localized edema           ICD-10: R60.0

ICD-9: 782.3              06/15/2021                Active

 

                          Acute on chronic diastolic congestive heart failure IC

D-10: I50.33

ICD-9: 428.33             2021                Active

 

                          Dementia without behavioral disturbance, unspecified d

ementia type ICD-10: 

F03.90

ICD-9: 294.20             2020                Active







Medications





          Medication Codes     Instructions Start Date Stop Date Status    Fill 

Instructions

 

                          albuterol sulfate 2.5 mg/3 mL (0.083 %) solution for n

ebulization RxNorm: 275780

             USE 1 VIAL IN NEBULIZER TWICE DAILY 2021   Activ

e        

 

                          albuterol sulfate 2.5 mg/3 mL (0.083 %) solution for n

ebulization RxNorm: 618870

             USE 1 VIAL IN NEBULIZER TWICE DAILY 2021   Activ

e        

 

                losartan 50 mg tablet RxNorm: 352783  Take 1 Tablet(s) Oral two 

times a day 

12/10/2021          2022          Active               

 

                    cyanocobalamin (vit B-12) 1,000 mcg/mL injection solution Rx

Norm: 671472      1 

Milliliter(s) Injection monthly 2021      Active          

will need 

syringe/needle for monthly injection dx b12 deficiency

 

                levothyroxine 112 mcg tablet RxNorm: 855031  Take 1 Tablet(s) Or

al every day 

2021          Active               

 

                    hydrocodone 5 mg-acetaminophen 325 mg tablet RxNorm: 560717 

     Take 1 Tablet(s) 

Oral three times a day as needed for pain 2021      Active

           

 

                    cyanocobalamin (vit B-12) 1,000 mcg/mL injection solution Rx

Norm: 666426      1 

Milliliter(s) Injection monthly 2021      Inactive        

will need 

syringe/needle for monthly injection dx b12 deficiency

 

                    cyanocobalamin (vit B-12) 1,000 mcg/mL injection solution Rx

Norm: 649788      1 

Milliliter(s) Injection monthly 2021      Inactive        

will need 

syringe/needle for monthly  injection dx b12 deficiency

 

                levothyroxine 112 mcg tablet RxNorm: 054479  1 Tablet(s) Oral ev

berta day 

2021          Inactive             

 

                    hydrocodone 5 mg-acetaminophen 325 mg tablet RxNorm: 718427 

     Take 1 Tablet(s) 

Oral three times a day as needed for pain 10/25/2021      10/25/2021      Inacti

ve         

 

                          albuterol sulfate 2.5 mg/3 mL (0.083 %) solution for n

ebulization RxNorm: 931605

             USE 1 VIAL IN NEBULIZER TWICE DAILY 10/19/2021   10/19/2021   Inact

maryjo      

 

                          albuterol sulfate 2.5 mg/3 mL (0.083 %) solution for n

ebulization RxNorm: 508723

             USE 1 VIAL IN NEBULIZER TWICE DAILY 10/19/2021   10/19/2021   Inact

maryjo      

 

                levothyroxine 112 mcg tablet RxNorm: 064129  1 Tablet(s) Oral ev

berta day 

10/07/2021          10/07/2021          Inactive             

 

                levothyroxine 112 mcg tablet RxNorm: 908817  1 Tablet(s) Oral ev

berta day 

10/07/2021          10/07/2021          Inactive             

 

                levothyroxine 112 mcg tablet RxNorm: 830645  1 Tablet(s) Oral ev

berta day 

2021          10/07/2021          Inactive             

 

                    cyanocobalamin (vit B-12) 1,000 mcg/mL injection solution Rx

Norm: 417775      Take 

Milliliter(s) Injection 2021      Inactive         

 

                    cyanocobalamin (vit B-12) 1,000 mcg/mL injection solution Rx

Norm: 743312      Take 

Milliliter(s) Injection 20211      Inactive         

 

                          albuterol sulfate 2.5 mg/3 mL (0.083 %) solution for n

ebulization RxNorm: 313791

             1 Unit Dose Inhalation two times a day DX J43.1 2021   Inactive      

 

                losartan 50 mg tablet RxNorm: 883307  1 Tablet(s) Oral two times

 a day 2021          Inactive             

 

                          albuterol sulfate 2.5 mg/3 mL (0.083 %) solution for n

ebulization RxNorm: 335607

             1 Unit Dose Inhalation two times a day DX J43.1 2021   Inactive      

 

                          albuterol sulfate 2.5 mg/3 mL (0.083 %) solution for n

ebulization RxNorm: 715704

             1 Unit Dose Inhalation two times a day 2021   In

active      

 

                          albuterol sulfate 2.5 mg/3 mL (0.083 %) solution for n

ebulization RxNorm: 155015

             1 Unit Dose Inhalation two times a day 2021   In

active      

 

             Norvasc 5 mg tablet RxNorm: 137640 1 Tablet(s) Oral every day 2022                Active                     

 

             Norvasc 5 mg tablet RxNorm: 657460 1 Tablet(s) Oral every day 2021                Inactive                   

 

                    metoprolol succinate ER 25 mg tablet,extended release 24 hr 

RxNorm: 759894      1 

Tablet(s) Oral every day 2021      No Stop Date    Active           

 

                    Ventolin HFA 90 mcg/actuation aerosol inhaler RxNorm: 686071

      1-2 Puff(s) 

Inhalation Every 4 hrs as needed 2021      No Stop Date    Active         

  

 

             furosemide 20 mg tablet RxNorm: 835449 1 Tablet(s) Oral every day 0

2021   No 

Stop Date                 Active                     

 

                    furosemide 40 mg tablet RxNorm: 460116      1 Tablet(s) Oral

 as directed 40mg BID x 5

days then 40mg in am and 20mg afternoon thereafter 2020          

Inactive                                give qty sufficient

 

                    potassium chloride ER 10 mEq tablet,extended release RxNorm:

 599789      1 Tablet(s) 

Oral as directed 1 tab bid x 5 days then resume daily 2020          

Inactive                                qty sufficient

 

                    potassium bicarbonate-citric acid 10 mEq effervescent tablet

 RxNorm: 6706483     1 

Tablet(s) Oral every day 10/15/2020      2020      Inactive         

 

                levothyroxine 125 mcg tablet RxNorm: 453096  1 Tablet(s) Oral ev

berta day 

10/15/2020          2021          Inactive             

 

                losartan 50 mg tablet RxNorm: 892877  1 Tablet(s) Oral two times

 a day 10/15/2020

                    2021          Inactive             

 

             furosemide 40 mg tablet RxNorm: 762694 1 Tablet(s) Oral every day 1

0/15/2020   

2020                Inactive                   

 

             Zyrtec 10 mg tablet RxNorm: 6964366 1 Tablet(s) Oral every day 10/0

2020   

2021                Inactive                   

 

                levothyroxine 125 mcg tablet RxNorm: 324389  1 Tablet(s) Oral ev

berta day 

10/05/2020          10/14/2020          Inactive             

 

             Zyrtec 10 mg tablet RxNorm: 6685841 1 Tablet(s) Oral every day 10/0

2020   

10/04/2020                Inactive                   

 

                aspirin 81 mg tablet,delayed release RxNorm: 595631  1 Tablet(s)

 Oral every day 

2020          No Stop Date        Active               

 

             Vitamin C 250 mg tablet RxNorm: 563495 1 Tablet(s) Oral every day 0

2020   No 

Stop Date                 Active                     

 

                Vitamin D3 50 mcg (2,000 unit) tablet RxNorm: 243839  1 Tablet(s

) Oral every day 

2020          No Stop Date        Active               

 

                levothyroxine 88 mcg tablet RxNorm: 106040  1 Tablet(s) Oral ledy

ry day 2020

                    10/04/2020          Inactive             

 

             losartan 50 mg tablet RxNorm: 812624 1 Tablet(s) Oral every day 09/

   

10/14/2020                Inactive                   

 

             furosemide 40 mg tablet RxNorm: 033843 1 Tablet(s) Oral every day 0

2020   

10/14/2020                Inactive                   

 

                    potassium bicarbonate-citric acid 10 mEq effervescent tablet

 RxNorm: 5737297     1 

Tablet(s) Oral every day 2020      10/14/2020      Inactive         

 

          Women's Multivitamin oral RxNorm:   oral      2020           Act

maryjo     

 

          Calcium 600 oral RxNorm: 1897 oral      2020           Active   

  

 

          albuterol sulfate inhalation RxNorm: 837416 inhalation 2020     

      Active     







Medication Administered





             Medication   Codes        Instructions Start Date   Status

 

                    cyanocobalamin (vit B-12) 1,000 mcg/mL injection solution Rx

Norm: 119387      

Milliliter                2021                No longer Active

 

                    cyanocobalamin (vit B-12) 1,000 mcg/mL injection solution Rx

Norm: 780196      

Milliliter                2021                No longer Active







Immunizations





             Vaccine      Codes        Dose         Date         Status

 

             Covid-19     CVX: 207                  04/10/2021    

 

             Covid-19     CVX: 207                  2021    

 

             Pneumococcal (Adult) Unknown                   2019    

 

             Zoster       CVX: 121                  2017    







Results





          Observation Observation Code Item      Item Code Result    Date      S

ervice Location

 

          Ferritin  Ord22     FERRITIN            16.7 ng/mL 2021 Unknown

 

          [facility]      Ord40     Iron                28 ug/dl  2021 Unknown

 

          [facility]      Ord40     UIBC                491 ug/dL 2021 Unknown

 

          [facility]      Ord40     TIBC                519 ug/dL 2021 Unknown

 

          [facility]      Ord40     Fe-%Sat             5.4 %     2021 Unknown

 

          Cbc With Differential Ord2      WBC                 8.78 K/ul 20 Unknown

 

          Cbc With Differential Ord2      RBC                 3.36 M/ul 20 Unknown

 

          Cbc With Differential Ord2      HGB                 8.8 g/dl  20 Unknown

 

          Cbc With Differential Ord2      Neut%               57.3 %    20 Unknown

 

          Cbc With Differential Ord2      HCT                 29.6 %    20 Unknown

 

          Cbc With Differential Ord2      Lymph%              32.8 %    20 Unknown

 

          Cbc With Differential Ord2      MCV                 88.1 fl   20 Unknown

 

          Cbc With Differential Ord2      MCH                 26.2 pg   20 Unknown

 

          Cbc With Differential Ord2      Mono%               7.2 %     20 Unknown

 

          Cbc With Differential Ord2      MCHC                29.7 pg   20 Unknown

 

          Cbc With Differential Ord2      Eos%                2.6 %     20 Unknown

 

          Cbc With Differential Ord2      PLT                 343 K/ul  12/21/20

21 Unknown

 

          Cbc With Differential Ord2      Baso%               0.1 %     20

21 Unknown

 

          Cbc With Differential Ord2      RDW                 14.8 %    20

21 Unknown

 

          Cbc With Differential Ord2      Neut ABS#           5.03 K/ul 20

21 Unknown

 

          Cbc With Differential Ord2      Lymph ABS#           2.88 K/ul 

021 Unknown

 

          Cbc With Differential Ord2      Mono ABS#           0.6 K/ul  20

21 Unknown

 

          Cbc With Differential Ord2      Eos ABS#            0.2 K/ul  20

21 Unknown

 

          Cbc With Differential Ord2      Baso ABS#           0.0 K/ul  20

21 Unknown

 

          Tsh       Ord6      TSH (3rd IS)           1.03 uIU/mL 2021 Un[facility]

own

 

          Free T4   Lca991    FREE T4             1.11 ng/dL 2021 Unknown

 

          Cbc With Differential Ord2      WBC                 8.64 K/ul 20

21 Unknown

 

          Cbc With Differential Ord2      RBC                 3.76 M/ul 20

21 Unknown

 

          Cbc With Differential Ord2      HGB                 9.9 g/dl  20

21 Unknown

 

          Cbc With Differential Ord2      Neut%               61.9 %    20

21 Unknown

 

          Cbc With Differential Ord2      HCT                 33.4 %    20

21 Unknown

 

          Cbc With Differential Ord2      MCV                 88.8 fl   20

21 Unknown

 

          Cbc With Differential Ord2      Lymph%              28.1 %    20

21 Unknown

 

          Cbc With Differential Ord2      Mono%               8.0 %     20

21 Unknown

 

          Cbc With Differential Ord2      MCH                 26.3 pg   20

21 Unknown

 

          Cbc With Differential Ord2      MCHC                29.6 pg   20

21 Unknown

 

          Cbc With Differential Ord2      Eos%                1.9 %     20

21 Unknown

 

          Cbc With Differential Ord2      PLT                 332 K/ul  20

21 Unknown

 

          Cbc With Differential Ord2      Baso%               0.1 %     20

21 Unknown

 

          Cbc With Differential Ord2      RDW                 15.2 %    20

21 Unknown

 

          Cbc With Differential Ord2      Neut ABS#           5.35 K/ul 20

21 Unknown

 

          Cbc With Differential Ord2      Lymph ABS#           2.43 K/ul 

021 Unknown

 

          Cbc With Differential Ord2      Mono ABS#           0.7 K/ul  20

21 Unknown

 

          Cbc With Differential Ord2      Eos ABS#            0.2 K/ul  20

21 Unknown

 

          Cbc With Differential Ord2      Baso ABS#           0.0 K/ul  20

21 Unknown

 

          Tsh       Ord6      TSH (3rd IS)           0.21 uIU/mL 2021 Un[facility]

own

 

          Comp Metabolic Rdz951    NA                  141 mEq/L 2021 [facility]

own

 

          Comp Metabolic Jzx943    K                   4.1 mEq/L 2021 [facility]

own

 

          Comp Metabolic Blz587    CL                  104 mEq/L 2021 [facility]

own

 

          Comp Metabolic Dki306    CO2                 28.0 mEq/L 2021 Un[facility]

nown

 

          Comp Metabolic Ges179    ANION GAP           13        2021 [facility]

own

 

          Comp Metabolic Wrs773    GLUCOSE             78 mg/dL  2021 [facility]

own

 

          Comp Metabolic Sly759    Creat               0.8 mg/dL 2021 [facility]

own

 

          Comp Metabolic Xgs786    eGFR                71 ml/min/1.73m2 20

21 Unknown

 

          Comp Metabolic Nvs494    BUN                 25 mg/dL  2021 Un[facility]

own

 

          Comp Metabolic Lsr905    B/C Ratio           30.9 Ratio 2021 Unk

nown

 

          Comp Metabolic Vnm795    CALCIUM             8.7 mg/dL 2021 [facility]

own

 

          Comp Metabolic Ovk351    ALK PHOS            61 U/L    2021 Un[facility]

own

 

          Comp Metabolic Ufo065    AST(SGOT)           17 U/L    2021 Un[facility]

own

 

          Comp Metabolic Xhg340    ALT(SGPT)           10 U/L    2021 Unkn

own

 

          Comp Metabolic Nqc275    BILI T              0.6 mg/dL 2021 Un[facility]

own

 

          Comp Metabolic Kls647    ALBUMIN             3.7 g/dL  2021 Unkn

own

 

          Comp Metabolic Cfg630    TPRO                6.1 g/dL  2021 Unkn

own

 

          Comp Metabolic Ogb077    GLOB                2.4 g/dL  2021 Un[facility]

own

 

          Comp Metabolic Eys886    A/G Ratio           1.6 Ratio 2021 Un[facility]

own

 

          Comp Metabolic Fni907    Osmo                285 mOsmo 2021 Un[facility]

own

 

          B12       Bkd315    B12                 >1500.00 pg/ml 2021 Un[facility]

own

 

          Free T4   Kdj491    FREE T4             1.23 ng/dL 2021 Unknown

 

          [facility]      Ord40     Iron                32 ug/dl  2021 Unknown

 

          [facility]      Ord40     UIBC                402 ug/dL 2021 Unknown

 

          [facility]      Ord40     TIBC                434 ug/dL 2021 Unknown

 

          [facility]      Ord40     Fe-%Sat             7.4 %     2021 Unknown

 

          Ferritin  Ord22     FERRITIN            27.6 ng/mL 2021 Unknown

 

          B12       Xpa307    B12                 187.00 pg/ml 2021 Unknow

n

 

          Comp Metabolic Edo712    NA                  139 mEq/L 2021 [facility]

own

 

          Comp Metabolic Ryn838    K                   4.1 mEq/L 2021 [facility]

own

 

          Comp Metabolic Cas174    CL                  103 mEq/L 2021 [facility]

own

 

          Comp Metabolic Vnk752    CO2                 27.0 mEq/L 2021 Unk

nown

 

          Comp Metabolic Fhz335    ANION GAP           13        2021 Un[facility]

own

 

          Comp Metabolic Jev794    GLUCOSE             79 mg/dL  2021 [facility]

own

 

          Comp Metabolic Vin097    Creat               0.7 mg/dL 2021 [facility]

own

 

          Comp Metabolic Fen971    eGFR                80 ml/min/1.73m2 20

21 Unknown

 

          Comp Metabolic Eoz376    BUN                 21 mg/dL  2021 Un[facility]

own

 

          Comp Metabolic Ffj606    B/C Ratio           28.8 Ratio 2021 Unk

nown

 

          Comp Metabolic Tsd140    CALCIUM             8.7 mg/dL 2021 [facility]

own

 

          Comp Metabolic Rgs336    ALK PHOS            74 U/L    2021 [facility]

own

 

          Comp Metabolic Qni146    AST(SGOT)           15 U/L    2021 [facility]

own

 

          Comp Metabolic Jlq305    ALT(SGPT)           17 U/L    2021 Un[facility]

own

 

          Comp Metabolic Zrt943    BILI T              0.4 mg/dL 2021 [facility]

own

 

          Comp Metabolic Qom523    ALBUMIN             3.5 g/dL  2021 [facility]

own

 

          Comp Metabolic Ykp142    TPRO                5.9 g/dL  2021 [facility]

own

 

          Comp Metabolic Jhx983    GLOB                2.4 g/dL  2021 Un[facility]

own

 

          Comp Metabolic Znb569    A/G Ratio           1.4 Ratio 2021 Un[facility]

own

 

          Comp Metabolic Uef927    Osmo                279 mOsmo 2021 Un[facility]

own

 

          Cbc With Differential Ord2      WBC                 7.63 K/ul 20

21 Unknown

 

          Cbc With Differential Ord2      RBC                 3.38 M/ul 20

21 Unknown

 

          Cbc With Differential Ord2      HGB                 9.4 g/dl  20

21 Unknown

 

          Cbc With Differential Ord2      Neut%               52.6 %    20

21 Unknown

 

          Cbc With Differential Ord2      HCT                 31.5 %    20

21 Unknown

 

          Cbc With Differential Ord2      MCV                 93.2 fl   20

21 Unknown

 

          Cbc With Differential Ord2      Lymph%              33.8 %    20

21 Unknown

 

          Cbc With Differential Ord2      MCH                 27.8 pg   20

21 Unknown

 

          Cbc With Differential Ord2      Mono%               7.9 %     20

21 Unknown

 

          Cbc With Differential Ord2      MCHC                29.8 pg   20

21 Unknown

 

          Cbc With Differential Ord2      Eos%                5.4 %     20

21 Unknown

 

          Cbc With Differential Ord2      Baso%               0.3 %     20

21 Unknown

 

          Cbc With Differential Ord2      PLT                 329 K/ul  20

21 Unknown

 

          Cbc With Differential Ord2      Neut ABS#           4.02 K/ul 20

21 Unknown

 

          Cbc With Differential Ord2      RDW                 15.2 %    20

21 Unknown

 

          Cbc With Differential Ord2      Lymph ABS#           2.58 K/ul 

021 Unknown

 

          Cbc With Differential Ord2      Mono ABS#           0.6 K/ul  20

21 Unknown

 

          Cbc With Differential Ord2      Eos ABS#            0.4 K/ul  20

21 Unknown

 

          Cbc With Differential Ord2      Baso ABS#           0.0 K/ul  20

21 Unknown







Procedures





                    Procedure           Codes               Date

 

                    THER/PROPH/DIAG INJ SC/IM CPT-4: 97129        2021

 

                    VITAMIN B12 INJECTION 1000 mcg CPT-4:         

 

                    THER/PROPH/DIAG INJ SC/IM CPT-4: 03304        2021







Vital Signs





                          Date                      Vital

 

                2021      SpO2: 96%       SpO2: 87%       SpO2: 94%

 

                2021      Blood Pressure 1: 138/82 Code: 8480-6 BMI: 33.5 

Code: 40612-4 Heart 

Rate 1: 74 bpm      Height: 5'3" Code: 8302-2 SpO2: 98%           Temperature: 3

6.2 (C) / 97.1 

(F)                                     Weight: 189 lbs  Code: 46507-1

 

                06/15/2021      Blood Pressure 1: 134/80 Code: 8480-6 Heart Rate

 1: 77 bpm Height: 

5'3" Code: 8302-2         SpO2: 93%                 Temperature: 36.3 (C) / 97.3

 (F)

 

                2021      Blood Pressure 1: 136/88 Code: 8480-6 Heart Rate

 1: 60 bpm Height:  

Code: 8302-2        SpO2: 93%           Temperature: 36.3 (C) / 97.3 (F) Weight:

 174 lbs  Code: 

04877-9

 

                2021      Blood Pressure 1: 164/92 Code: 8480-6 BMI: 31.4 

Code: 62030-1 Heart 

Rate 1: 67 bpm      Height: 5'3" Code: 8302-2 SpO2: 94%           Temperature: 3

6.2 (C) / 97.1 

(F)                                     Weight: 177 lbs  Code: 01135-4

 

                2021      Blood Pressure 1: 134/72 Code: 8480-6 BMI: 30.8 

Code: 87306-3 Heart 

Rate 1: 74 bpm  Height: 5'3" Code: 8302-2 Respiratory Rate: 18 bpm SpO2: 95%    

   

Temperature: 36.3 (C) / 97.4 (F)        Weight: 174 lbs  Code: 28817-0

 

                10/15/2020      Blood Pressure 1: 202/94 Code: 8480-6 BMI: 30.6 

Code: 76832-9 Heart 

Rate 1: 73 bpm  Height: 5'3" Code: 8302-2 Respiratory Rate: 17 bpm SpO2: 91%    

   

Temperature: 36.8 (C) / 98.2 (F)        Weight: 173 lbs  Code: 38100-4

 

                2020      Blood Pressure 1: 144/90 Code: 8480-6 BMI: 29.4 

Code: 02827-9 Heart 

Rate 1: 72 bpm      Height: 5'3" Code: 8302-2 SpO2: 98%           Temperature: 3

6.7 (C) / 98.0 

(F)                                     Weight: 166 lbs  Code: 13392-3







Functional Status

No Functional Status data



Reason For Visit





                    Reason For Visit    Effective Dates     Notes

 

                    hypertension        2021           

 

                    hypertension        06/15/2021           

 

                    hypertension        2021           

 

                    Hospital Follow Up  2021           

 

                    shortness of breath 2021           

 

                    shortness of breath 10/15/2020           

 

                    Hospital Follow Up  2020           







Encounters





             Encounter    Performer    Location     Codes        Date

 

                                         EST. PATIENT, LEVEL I

Diagnosis: CHF (congestive heart failure)[ICD10: I50.9]

Diagnosis: On supplemental oxygen therapy[ICD10: Z99.81]

Diagnosis: Panlobular emphysema[ICD10: J43.1] Kimberly rhodes MD, 

[facility]                       CPT-4: 05825              2021

 

                                        [phone] EST. PATIENT, LEVEL IV

Diagnosis: Vitamin B12 deficiency (dietary) anemia[ICD10: D51.8]

Diagnosis: Essential (primary) hypertension[ICD10: I10]

Diagnosis: Atrophy of thyroid (acquired)[ICD10: E03.4] Franca Souza MD, [facility]          CPT-4: 11180              2021

 

                                        [phone] EST. PATIENT, LEVEL IV

Diagnosis: Essential (primary) hypertension[ICD10: I10]

Diagnosis: Vitamin B12 deficiency (dietary) anemia[ICD10: D51.8]

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

Diagnosis: Localized edema[ICD10: R60.0] Franca mejia MD, [facility]

                          CPT-4: 10270              06/15/2021

 

                                        [phone] EST. PATIENT, LEVEL IV

Diagnosis: CHF (congestive heart failure)[ICD10: I50.9]

Diagnosis: Essential (primary) hypertension[ICD10: I10]

Diagnosis: Vitamin B12 deficiency (dietary) anemia[ICD10: D51.8] Franca Souza MD, [facility] CPT-4: 44077        2021

 

                                        [phone] EST. PATIENT, LEVEL IV

Diagnosis: Acute on chronic diastolic congestive heart failure[ICD10: I50.33]

Diagnosis: Anemia[ICD10: D64.9]

Diagnosis: Essential (primary) hypertension[ICD10: I10] Franca Souza MD, [facility]          CPT-4: 73732              2021

 

                                        [phone] EST. PATIENT, LEVEL IV

Diagnosis: Essential (primary) hypertension[ICD10: I10]

Diagnosis: CHF (congestive heart failure)[ICD10: I50.9]

Diagnosis: Atrophy of thyroid (acquired)[ICD10: E03.4] [facility]          CPT-4: 72509              2021

 

                                        [phone] EST. PATIENT, LEVEL IV

Diagnosis: Essential (primary) hypertension[ICD10: I10]

Diagnosis: CHF (congestive heart failure)[ICD10: I50.9]

Diagnosis: On supplemental oxygen therapy[ICD10: Z99.81]

Diagnosis: Atrophy of thyroid (acquired)[ICD10: E03.4] [facility]          CPT-4: 97111              10/15/2020

 

                                        OFFICE  VISIT, NEW - LEVEL 3

Diagnosis: CHF (congestive heart failure)[ICD10: I50.9]

Diagnosis: Dementia without behavioral disturbance, unspecified dementia 
type[ICD10: F03.90] [facility] CPT-4: 75247    







Plan of Care





             Planned Activity Notes        Codes        Status       Date

 

             Patient Education: Patient Medication Summary                      

     Completed    2021

 

             Patient Education: Patient Medication Summary                      

     Completed    10/25/2021

 

             Appointment:                            Nurse Visit  2021

 

             Patient Education: Patient Medication Summary                      

     Completed    2021

 

                          Visit Plan:               Hypertension - well controll

ed - continue with current medications,

continue with no added salt diet. Pt has been encouraged to exercise daily. The 
pt has been advised to call the office if there are any acute concerns about 
change in blood pressure readings at home. Hypothyroidism - pt with chronic 
hypothyroidism, continue with current medication, will monitor pt for signs or 
symptoms of lack of adequate supplementation. Pt is to continue with current 
dose of medication unless directed otherwise. Check labs at regular intervals q 
3 months or q 6 months based on previous levels of control. B12 def -injection 
today

                                                            2021

 

                                        Appointment: Franca Urbina 

WPtel:[phone]

                                        [facility]

                                              (30 min) Complex 2021

 

             Patient Education: Patient Medication Summary                      

     Completed    2021

 

             Appointment:                            Injection    2021

 

             Patient Education: Patient Medication Summary                      

     Completed    2021

 

             Patient Education: Patient Medication Summary                      

     Completed    2021

 

             Care Plan: Cbc With Differential                           Pending 

     2021

 

             Care Plan: Comp Metabolic                           Pending      

 

             Care Plan: Tsh                           Pending      2021

 

             Care Plan: Magnesium                           Pending      

021

 

             Care Plan: Free T4                           Pending      

 

                          Visit Plan:               Hypertension - well controll

ed - continue with current medications,

continue with no added salt diet. Pt has been encouraged to exercise daily. The 
pt has been advised to call the office if there are any acute concerns about 
change in blood pressure readings at home. Anemia- recheck labs -continue b12 
injections Edema - pt has been advised to elevate legs to prevent dependent 
edema, compression has been recommended to help to naturally decrease peripheral
edema. Diuretic use has been discussed and pt has been instructed in appropriate
use of such medication as necessary to further attempt to reduce peripheral 
edema. Hypothyroidism -check levels with next labs

                                                            06/15/2021

 

                                        Appointment: Franca Urbina 

WPtel:[phone]

                                        [address] [facility]

                                              (30 min) Complex 06/15/2021

 

             Patient Education: Patient Medication Summary                      

     Completed    06/15/2021

 

                                        Appointment: Franca Urbina 

WPtel:[phone]

                                        [address] [facility]

                                              (30 min) Complex 2021

 

                                        Appointment: Kimberly Souza 

WPtel:[phone]

                                        [address] [facility]

                                              (15 min) Moderate 04/15/2021

 

                          Visit Plan:               Hypertension - well controll

ed - continue with current medications,

continue with no added salt diet. Pt has been encouraged to exercise daily. The 
pt has been advised to call the office if there are any acute concerns about 
change in blood pressure readings at home. CHF - congestive heart failure - Pt 
has Chronic congestive heart failure - and is currently fairly well maintained 
on the current medications. Today there is no change in the treatment course. If
symptoms worsen, increase edema or more that 2-3 pound weight gain over a week 
without improvement in the symptoms with a decrease in the sodium of the diet, 
then the pt is to have the office alerted. Anemia- continue with B12 injections 
per 

                                                            2021

 

                                        Appointment: Franca Urbina 

WPtel:[phone]

                                        [address] [facility]

                                              (30 min) Complex 2021

 

             Patient Education: Patient Medication Summary                      

     Completed    2021

 

                          Visit Plan:               Acute on chronic CHF - patie

nt refuses cardiology -continue with 

oxygen at all times- patient did not wear oxygen to appt- patient and sister 
verbalized understanding of plan. Did recommend follow up with cardiology due to
recurrent hospitalizations for CHF and patient continues to refuse Anemia- 
repeat labs today HTN -controlled- no change in medications today

                                                            2021

 

                                        Appointment: Franca Urbina 

WPtel:[phone] [address] [facility]

                                              (30 min) Complex 2021

 

             Patient Education: Patient Medication Summary                      

     Completed    2021

 

             Patient Education: Patient Medication Summary                      

     Completed    2021

 

                          Visit Plan:               Chronic Congestive heart sheryl

lure - symptoms stable on lasix -pt has

refused referral to Cardiology - she is not interested in any other physician 
and will not go to another specialist per her report - pt to continue with 
chronic oxygen therapy. Hypertension - well controlled - continue with current 
medications, continue with no added salt diet. Pt has been encouraged to 
exercise daily. The pt has been advised to call the office if there are any 
acute concerns about change in blood pressure readings at home. Hypothyroidism -
pt with chronic hypothyroidism, continue with current medication, will monitor 
pt for signs or symptoms of lack of adequate supplementation. Pt is to continue 
with current dose of medication unless directed otherwise. Check labs at regular
intervals q 3 months or q 6 months based on previous levels of control.

                                                            2021

 

             Patient Education: Patient Medication Summary                      

     Completed    2021

 

                                        Appointment: Kimberly Souza 

WPtel:[phone]

                                        [address] [facility]

[place]                                              (30 min) Complex 2020

 

                          Visit Plan:               Chronic Congestive heart sheryl

lure - symptoms stable on lasix - need 

to initiate referral to Dr. Mendoza - if not already done - pt to continue with 
chronic oxygen therapy - due to her weakness and need for easier to transport of
the oxygen from place to place, I have recommended that she needs a portable 
oxygen concentrator. She is too weak to transport large oxygen bottles from 
place to place and is too weak to move them up the stairs in her home. She has 
tripped over her oxygen tubing from the large oxygen concentrator in her house, 
therefore the extra long tubing is not safe to have in her home. She also has 
trouble getting the oxygen tanks moved from house to vehicles and around places 
if she goes shopping. Hypertension - uncontrolled - the patient's medications 
have been modified as documented in the visit note. The patient has been 
counseled to cut back on salt in diet for a no added salt diet, low fat diet, 
start an exercise program with low weight bearing exercises and higher aerobic 
activity for heart health. The patient is to check blood pressure readings as an
outpatient and either fax, call, or email the readings to the office next week 
for practitioner to review. The pt is to call for acute concerns. Increase 
losartan from 50mg daily to 50mg twice daily. Hypothyroidism - pt with chronic 
hypothyroidism, continue with current medication, will monitor pt for signs or 
symptoms of lack of adequate supplementation. Pt is to continue with current 
dose of medication unless directed otherwise. Check labs at regular intervals q 
3 months or q 6 months based on previous levels of control. Fasting labs to be 
done to be drawn by [facility].

                                                            10/15/2020

 

                                        Appointment: Kimberly Souza 

WPtel:[phone]

                                        [address] [place], [place] [address]

                                              (15 min) Moderate 10/15/2020

 

             Patient Education: Patient Medication Summary                      

     Completed    10/15/2020

 

                          Visit Plan:               CHF - congestive heart failu

re - Pt has Chronic congestive heart 

failure - and is currently fairly well maintained on the current medications. 
Today there is no change in the treatment course. If symptoms worsen, increase 
edema or more that 2-3 pound weight gain over a week without improvement in the 
symptoms with a decrease in the sodium of the diet, then the pt is to have the 
office alerted. Dementia - Pt with slowly progressive pattern. I have discussed 
with pt and family the prognosis of this disease state and the need for the 
family to anticipate further decline with behavior changes. Continue with 
current plan of treatment.

                                                            2020

 

             Patient Education: Patient Medication Summary                      

     Completed    2020







Instructions





                          Comment                   Date

 

                                        . Hypertension - well controlled - pop

nue with current medications, continue 

with no added salt diet.  Pt has been encouraged to exercise daily.

The pt has been advised to call the office if there are any acute concerns about
change in blood pressure readings at home.



Hypothyroidism - pt with chronic hypothyroidism, continue with current 
medication, will monitor pt for signs or symptoms of lack of adequate 
supplementation.  Pt is to continue with current dose of medication unless 
directed otherwise.  Check labs at regular intervals q 3 months or q 6 months 
based on previous levels of control.



B12 def -injection today 

                                        2021

 

                                        HAVE HOME HEALTH DRAW LABS WHEN THEY DO 

NEXT B12 INJECTION - CBC, CMP, TSH, FREE

T4, BNP, MAGNESIUM . Hypertension - well controlled - continue with current 
medications, continue with no added salt diet.  Pt has been encouraged to 
exercise daily.

The pt has been advised to call the office if there are any acute concerns about
change in blood pressure readings at home.



Anemia- recheck labs -continue b12 injections 



Edema - pt has been advised to elevate legs to prevent dependent edema, 
compression has been recommended to help to naturally decrease peripheral edema.
 Diuretic use has been discussed and pt has been instructed in appropriate use 
of such medication as necessary to further attempt to reduce peripheral edema.



Hypothyroidism -check levels with next labs  06/15/2021

 

                                        CONTINUE LOW SODIUM DIET



CONTINUE HOME HEALTH WITH [facility] VITAMIN B12 INJECTIONS



FOLLOW UP WITH DR OVALLE FOR PFTS AND CT SCAN

                                        . Hypertension - well controlled - pop

nue with current medications, continue 

with no added salt diet.  Pt has been encouraged to exercise daily.

The pt has been advised to call the office if there are any acute concerns about
change in blood pressure readings at home.



CHF - congestive heart failure - Pt has Chronic congestive heart failure - and 
is currently fairly well maintained on the current medications.  Today there is 
no change in the treatment course.  If symptoms worsen, increase edema or more 
that 2-3 pound weight gain over a week without improvement in the symptoms with 
a decrease in the sodium of the diet, then the pt is to have the office alerted.



Anemia- continue with B12 injections per  

                                        2021

 

                                        . Acute on chronic CHF - patient refuses

 cardiology -continue with oxygen at all

times- patient did not wear oxygen to appt- patient and sister verbalized 
understanding of plan. Did recommend follow up with cardiology due to recurrent 
hospitalizations for CHF and patient continues to refuse 



Anemia- repeat labs today 



HTN -controlled- no change in medications today  2021

 

                                        . Chronic Congestive heart failure - sym

ptoms stable on lasix  -pt has refused 

referral to Cardiology - she is not interested in any other physician and will 
not go to another specialist per her report - pt to continue with chronic oxygen
therapy.



Hypertension - well controlled - continue with current medications, continue 
with no added salt diet.  Pt has been encouraged to exercise daily.

The pt has been advised to call the office if there are any acute concerns about
change in blood pressure readings at home.



Hypothyroidism - pt with chronic hypothyroidism, continue with current 
medication, will monitor pt for signs or symptoms of lack of adequate 
supplementation.  Pt is to continue with current dose of medication unless 
directed otherwise.  Check labs at regular intervals q 3 months or q 6 months 
based on previous levels of control.

                                        2021

 

                                        . Chronic Congestive heart failure - sym

ptoms stable on lasix  - need to 

initiate referral to Dr. Mendoza - if not already done - pt to continue with 
chronic oxygen therapy - due to her weakness and need for easier to transport of
the oxygen from place to place, I have recommended that she needs a portable 
oxygen concentrator.  She is too weak to transport large oxygen bottles from 
place to place and is too weak to move them up the stairs in her home.  She has 
tripped over her oxygen tubing from the large oxygen concentrator in her house, 
therefore the extra long tubing is not safe to have in her home.  She also has 
trouble getting the oxygen tanks moved from house to vehicles and around places 
if she goes shopping.



Hypertension - uncontrolled - the patient's medications have been modified as 
documented in the visit note.  The patient has been counseled to cut back on 
salt in diet for a no added salt diet, low fat diet, start an exercise program 
with low weight bearing exercises and higher aerobic activity for heart health. 


The patient is to check blood pressure readings as an outpatient and either fax,
call, or email the readings to the office next week for practitioner to review.

The pt is to call for acute concerns.

Increase losartan from 50mg daily to 50mg twice daily.



Hypothyroidism - pt with chronic hypothyroidism, continue with current 
medication, will monitor pt for signs or symptoms of lack of adequate 
supplementation.  Pt is to continue with current dose of medication unless 
directed otherwise.  Check labs at regular intervals q 3 months or q 6 months 
based on previous levels of control.



Fasting labs to be done to be drawn by [facility]. 10/15/2020

 

                                        Will set up with [facility]



pt is to consider moving to assisted living



pt is to get a life alert bracelet. CHF - congestive heart failure - Pt has 
Chronic congestive heart failure - and is currently fairly well maintained on 
the current medications.  Today there is no change in the treatment course.  If 
symptoms worsen, increase edema or more that 2-3 pound weight gain over a week 
without improvement in the symptoms with a decrease in the sodium of the diet, 
then the pt is to have the office alerted.



Dementia - Pt with slowly progressive pattern.  I have discussed with pt and 
family the prognosis of this disease state and the need for the family to 
anticipate further decline with behavior changes.  Continue with current plan of
treatment.

                                        2020







Medical Equipment

No Medical Equipment data



Health Concerns Section

Health Concerns data not found



Goals Section

Goals data not found



Interventions Section

Interventions data not found



Health Status Evaluations/Outcomes Section

Health Status Evaluations/Outcomes data not found



Advance Directives

No Advance Directive data

## 2022-01-03 NOTE — XMS REPORT
CCD document using C-CDA

                             Created on: 2021



Tierra Rizvi

External Reference #: [id number]

: 1934

Sex: Female



Demographics





                          Address                   [address]  [place]  [address]

 

                          Home Phone                [phone]

 

                          Preferred Language        Unknown

 

                          Marital Status            Unknown

 

                          Sikh Affiliation     Unknown

 

                          Race                      White

 

                          Ethnic Group              Not  or 





Author





                          Author                    Tierra Moreno

 

                          Organization              Kimberly Souza MD, [facility]

 

                          Address                   [address] [place]  [address]



 

                          Phone                     [phone]







Care Team Providers





                    Care Team Member Name Role                Phone

 

                    Kimberly Souza     PP                  Unavailable

 

                          CCM                       Unavailable







Summary Purpose

Interface Exchange



Insurance Providers





             Payer name   Policy type / Coverage type Covered party ID Effective

 Begin Date 

Effective End Date

 

             [employer] Medicare Part B Medicare Part B [id number]  Unknown      Unkno

wn

 

             [facility] Medicare Part B [id number] Unkno

wn      Unknown







Family history





Sister



                          Diagnosis                 Age At Onset

 

                          Arthritis                 Unknown

 

                          Hypertension              Unknown

 

                          Anemia                    Unknown

 

                          Diabetes mellitus Type 2  Unknown

 

                          Osteoporosis              Unknown







Son



                          Diagnosis                 Age At Onset

 

                          Myocardial infarction     Unknown

 

                          Hypertension              Unknown

 

                          Diabetes mellitus Type 2  Unknown







Father



                          Diagnosis                 Age At Onset

 

                          Cancer                    Unknown

 

                          Alcoholism                Unknown

 

                          kidney disease            Unknown







Mother



                          Diagnosis                 Age At Onset

 

                          Cancer                    Unknown







Brother



                          Diagnosis                 Age At Onset

 

                          Hypertension              Unknown

 

                          Cancer                    Unknown

 

                          Alcoholism                Unknown

 

                          Diabetes mellitus Type 2  Unknown

 

                          kidney disease            Unknown

 

                          Myocardial infarction     Unknown







Social History





                Social History Element Codes           Description     Effective

 Dates

 

                Marital status  Unknown                  2020

 

                Employment      Unknown         Retired         2020

 

                    Tobacco history     SNOMED CT: 1712839  Quit over 10 years a

go

                          2020

 

                Alcohol history SNOMED CT: 769629552 Never drinks alcohol 2020







Allergies, Adverse Reactions, Alerts





           Substance  Reaction   Codes      Entered Date Inactivated Date Status

 

           Penicillin rash,      Unknown    2020 No Inactive Date Active

 

             * OTHER REACTION - SEE ANSWER BOX Tetracycline- Rash Unknown      0

2020   No 

Inactive Date                           Active







Problems





                Condition       Codes           Effective Dates Condition Status

 

                spinal stenosis Unknown         10/25/2021      Active

 

                          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

ication ICD-10: M48.062

ICD-9: 724.03             10/25/2021                Active

 

                          CHF (congestive heart failure) ICD-10: I50.9

ICD-9: 428.0              2020                Active

 

                          On supplemental oxygen therapy ICD-10: Z99.81

ICD-9: V46.2              10/15/2020                Active

 

                          Panlobular emphysema      ICD-10: J43.1

ICD-9: 492.8              2021                Active

 

                          Atrophy of thyroid (acquired) ICD-10: E03.4

ICD-9: 244.8              10/15/2020                Active

 

                          Essential (primary) hypertension ICD-10: I10

ICD-9: 401.1              10/15/2020                Active

 

                          Vitamin B12 deficiency (dietary) anemia ICD-10: D51.8

ICD-9: 281.1              2021                Active

 

                          Localized edema           ICD-10: R60.0

ICD-9: 782.3              06/15/2021                Active

 

                          Acute on chronic diastolic congestive heart failure IC

D-10: I50.33

ICD-9: 428.33             2021                Active

 

                          Anemia                    ICD-10: D64.9

ICD-9: 285.9              2021                Active

 

                          Dementia without behavioral disturbance, unspecified d

ementia type ICD-10: 

F03.90

ICD-9: 294.20             2020                Active







Medications





          Medication Codes     Instructions Start Date Stop Date Status    Fill 

Instructions

 

                losartan 50 mg tablet RxNorm: 582185  Take 1 Tablet(s) Oral two 

times a day 

12/10/2021          2022          Active               

 

                    cyanocobalamin (vit B-12) 1,000 mcg/mL injection solution Rx

Norm: 191108      1 

Milliliter(s) Injection monthly 2021      Active          

will need 

syringe/needle for monthly injection dx b12 deficiency

 

                levothyroxine 112 mcg tablet RxNorm: 762465  Take 1 Tablet(s) Or

al every day 

2021          Active               

 

                    hydrocodone 5 mg-acetaminophen 325 mg tablet RxNorm: 934802 

     Take 1 Tablet(s) 

Oral three times a day as needed for pain 2021      Active

           

 

                    cyanocobalamin (vit B-12) 1,000 mcg/mL injection solution Rx

Norm: 565976      1 

Milliliter(s) Injection monthly 2021      Inactive        

will need 

syringe/needle for monthly injection dx b12 deficiency

 

                    cyanocobalamin (vit B-12) 1,000 mcg/mL injection solution Rx

Norm: 178298      1 

Milliliter(s) Injection monthly 2021      Inactive        

will need 

syringe/needle for monthly  injection dx b12 deficiency

 

                levothyroxine 112 mcg tablet RxNorm: 023155  1 Tablet(s) Oral ev

berta day 

2021          Inactive             

 

                    hydrocodone 5 mg-acetaminophen 325 mg tablet RxNorm: 741469 

     Take 1 Tablet(s) 

Oral three times a day as needed for pain 10/25/2021      10/25/2021      Inacti

ve         

 

                          albuterol sulfate 2.5 mg/3 mL (0.083 %) solution for n

ebulization RxNorm: 857137

             USE 1 VIAL IN NEBULIZER TWICE DAILY 10/19/2021   2021   Inact

maryjo      

 

                          albuterol sulfate 2.5 mg/3 mL (0.083 %) solution for n

ebulization RxNorm: 228695

             USE 1 VIAL IN NEBULIZER TWICE DAILY 10/19/2021   10/19/2021   Inact

maryjo      

 

                levothyroxine 112 mcg tablet RxNorm: 989089  1 Tablet(s) Oral ev

berta day 

10/07/2021          10/07/2021          Inactive             

 

                levothyroxine 112 mcg tablet RxNorm: 429033  1 Tablet(s) Oral ev

berta day 

10/07/2021          10/07/2021          Inactive             

 

                levothyroxine 112 mcg tablet RxNorm: 677978  1 Tablet(s) Oral ev

berta day 

2021          10/07/2021          Inactive             

 

                    cyanocobalamin (vit B-12) 1,000 mcg/mL injection solution Rx

Norm: 551960      Take 

Milliliter(s) Injection 2021      Inactive         

 

                    cyanocobalamin (vit B-12) 1,000 mcg/mL injection solution Rx

Norm: 612242      Take 

Milliliter(s) Injection 2021      Inactive         

 

                          albuterol sulfate 2.5 mg/3 mL (0.083 %) solution for n

ebulization RxNorm: 130027

             1 Unit Dose Inhalation two times a day DX J43.1 2021   Inactive      

 

                losartan 50 mg tablet RxNorm: 485904  1 Tablet(s) Oral two times

 a day 2021          Inactive             

 

                          albuterol sulfate 2.5 mg/3 mL (0.083 %) solution for n

ebulization RxNorm: 690085

             1 Unit Dose Inhalation two times a day DX J43.1 2021   Inactive      

 

                          albuterol sulfate 2.5 mg/3 mL (0.083 %) solution for n

ebulization RxNorm: 890147

             1 Unit Dose Inhalation two times a day 2021   In

active      

 

                          albuterol sulfate 2.5 mg/3 mL (0.083 %) solution for n

ebulization RxNorm: 563123

             1 Unit Dose Inhalation two times a day 2021   In

active      

 

             Norvasc 5 mg tablet RxNorm: 606211 1 Tablet(s) Oral every day 2022                Active                     

 

             Norvasc 5 mg tablet RxNorm: 378315 1 Tablet(s) Oral every day 2021                Inactive                   

 

                    metoprolol succinate ER 25 mg tablet,extended release 24 hr 

RxNorm: 911658      1 

Tablet(s) Oral every day 2021      No Stop Date    Active           

 

                    Ventolin HFA 90 mcg/actuation aerosol inhaler RxNorm: 970189

      1-2 Puff(s) 

Inhalation Every 4 hrs as needed 2021      No Stop Date    Active         

  

 

             furosemide 20 mg tablet RxNorm: 139805 1 Tablet(s) Oral every day 0

2021   No 

Stop Date                 Active                     

 

                    furosemide 40 mg tablet RxNorm: 311640      1 Tablet(s) Oral

 as directed 40mg BID x 5

days then 40mg in am and 20mg afternoon thereafter 2020          

Inactive                                give qty sufficient

 

                    potassium chloride ER 10 mEq tablet,extended release RxNorm:

 207597      1 Tablet(s) 

Oral as directed 1 tab bid x 5 days then resume daily 2020          

Inactive                                qty sufficient

 

                    potassium bicarbonate-citric acid 10 mEq effervescent tablet

 RxNorm: 3179330     1 

Tablet(s) Oral every day 10/15/2020      2020      Inactive         

 

                levothyroxine 125 mcg tablet RxNorm: 828562  1 Tablet(s) Oral ev

berta day 

10/15/2020          2021          Inactive             

 

                losartan 50 mg tablet RxNorm: 511570  1 Tablet(s) Oral two times

 a day 10/15/2020

                    2021          Inactive             

 

             furosemide 40 mg tablet RxNorm: 626793 1 Tablet(s) Oral every day 1

0/15/2020   

2020                Inactive                   

 

             Zyrtec 10 mg tablet RxNorm: 1994406 1 Tablet(s) Oral every day 10/0

2020   

2021                Inactive                   

 

                levothyroxine 125 mcg tablet RxNorm: 376284  1 Tablet(s) Oral ev

berta day 

10/05/2020          10/14/2020          Inactive             

 

             Zyrtec 10 mg tablet RxNorm: 0259738 1 Tablet(s) Oral every day 10/0

2020   

10/04/2020                Inactive                   

 

                aspirin 81 mg tablet,delayed release RxNorm: 444570  1 Tablet(s)

 Oral every day 

2020          No Stop Date        Active               

 

             Vitamin C 250 mg tablet RxNorm: 822424 1 Tablet(s) Oral every day 0

2020   No 

Stop Date                 Active                     

 

                Vitamin D3 50 mcg (2,000 unit) tablet RxNorm: 348244  1 Tablet(s

) Oral every day 

2020          No Stop Date        Active               

 

                levothyroxine 88 mcg tablet RxNorm: 838401  1 Tablet(s) Oral ledy

ry day 2020

                    10/04/2020          Inactive             

 

             losartan 50 mg tablet RxNorm: 601846 1 Tablet(s) Oral every day 09/

   

10/14/2020                Inactive                   

 

             furosemide 40 mg tablet RxNorm: 597975 1 Tablet(s) Oral every day 0

2020   

10/14/2020                Inactive                   

 

                    potassium bicarbonate-citric acid 10 mEq effervescent tablet

 RxNorm: 2307854     1 

Tablet(s) Oral every day 2020      10/14/2020      Inactive         

 

          Women's Multivitamin oral RxNorm:   oral      2020           Act

maryjo     

 

          Calcium 600 oral RxNorm: 1897 oral      2020           Active   

  

 

          albuterol sulfate inhalation RxNorm: 054431 inhalation 2020     

      Active     







Medication Administered





             Medication   Codes        Instructions Start Date   Status

 

                    cyanocobalamin (vit B-12) 1,000 mcg/mL injection solution Rx

Norm: 381875      

Milliliter                2021                No longer Active

 

                    cyanocobalamin (vit B-12) 1,000 mcg/mL injection solution Rx

Norm: 579755      

Milliliter                2021                No longer Active







Immunizations





             Vaccine      Codes        Dose         Date         Status

 

             Covid-19     CVX: 207                  04/10/2021    

 

             Covid-19     CVX: 2021    

 

             Pneumococcal (Adult) Unknown                   2019    

 

             Zoster       CVX: 121                  2017    







Results





          Observation Observation Code Item      Item Code Result    Date      S

ervice Location

 

          Cbc With Differential Ord2      WBC                 8.78 K/ul 20 Unknown

 

          Cbc With Differential Ord2      RBC                 3.36 M/ul 20 Unknown

 

          Cbc With Differential Ord2      HGB                 8.8 g/dl  20 Unknown

 

          Cbc With Differential Ord2      HCT                 29.6 %    20 Unknown

 

          Cbc With Differential Ord2      Neut%               57.3 %    20 Unknown

 

          Cbc With Differential Ord2      MCV                 88.1 fl   20 Unknown

 

          Cbc With Differential Ord2      Lymph%              32.8 %    20 Unknown

 

          Cbc With Differential Ord2      Mono%               7.2 %     20 Unknown

 

          Cbc With Differential Ord2      MCH                 26.2 pg   20 Unknown

 

          Cbc With Differential Ord2      Eos%                2.6 %     20 Unknown

 

          Cbc With Differential Ord2      MCHC                29.7 pg   20 Unknown

 

          Cbc With Differential Ord2      PLT                 343 K/ul  20 Unknown

 

          Cbc With Differential Ord2      Baso%               0.1 %     20 Unknown

 

          Cbc With Differential Ord2      Neut ABS#           5.03 K/ul 20 Unknown

 

          Cbc With Differential Ord2      RDW                 14.8 %    20 Unknown

 

          Cbc With Differential Ord2      Lymph ABS#           2.88 K/ul 

021 Unknown

 

          Cbc With Differential Ord2      Mono ABS#           0.6 K/ul  20 Unknown

 

          Cbc With Differential Ord2      Eos ABS#            0.2 K/ul  20 Unknown

 

          Cbc With Differential Ord2      Baso ABS#           0.0 K/ul  20 Unknown

 

          Tsh       Ord6      TSH (3rd IS)           1.03 uIU/mL 2021 Unkn

own

 

          Free T4   Cor487    FREE T4             1.11 ng/dL 2021 Unknown

 

          Cbc With Differential Ord2      WBC                 8.64 K/ul 20

21 Unknown

 

          Cbc With Differential Ord2      RBC                 3.76 M/ul 20

21 Unknown

 

          Cbc With Differential Ord2      HGB                 9.9 g/dl  20

21 Unknown

 

          Cbc With Differential Ord2      Neut%               61.9 %    20

21 Unknown

 

          Cbc With Differential Ord2      HCT                 33.4 %    20

21 Unknown

 

          Cbc With Differential Ord2      MCV                 88.8 fl   20

21 Unknown

 

          Cbc With Differential Ord2      Lymph%              28.1 %    20

21 Unknown

 

          Cbc With Differential Ord2      Mono%               8.0 %     20

21 Unknown

 

          Cbc With Differential Ord2      MCH                 26.3 pg   20

21 Unknown

 

          Cbc With Differential Ord2      MCHC                29.6 pg   20

21 Unknown

 

          Cbc With Differential Ord2      Eos%                1.9 %     20

21 Unknown

 

          Cbc With Differential Ord2      PLT                 332 K/ul  20

21 Unknown

 

          Cbc With Differential Ord2      Baso%               0.1 %     20

21 Unknown

 

          Cbc With Differential Ord2      RDW                 15.2 %    20

21 Unknown

 

          Cbc With Differential Ord2      Neut ABS#           5.35 K/ul 20

21 Unknown

 

          Cbc With Differential Ord2      Lymph ABS#           2.43 K/ul 

021 Unknown

 

          Cbc With Differential Ord2      Mono ABS#           0.7 K/ul  20

21 Unknown

 

          Cbc With Differential Ord2      Eos ABS#            0.2 K/ul  20

21 Unknown

 

          Cbc With Differential Ord2      Baso ABS#           0.0 K/ul  20

21 Unknown

 

          Tsh       Ord6      TSH (3rd IS)           0.21 uIU/mL 2021 Unkn

own

 

          Comp Metabolic Eec868    NA                  141 mEq/L 2021 Unkn

own

 

          Comp Metabolic Dhh286    K                   4.1 mEq/L 2021 Unkn

own

 

          Comp Metabolic Luz591    CL                  104 mEq/L 2021 Unkn

own

 

          Comp Metabolic Ihf800    CO2                 28.0 mEq/L 2021 Unk

nown

 

          Comp Metabolic Ogd620    ANION GAP           13        2021 Unkn

own

 

          Comp Metabolic Beh088    GLUCOSE             78 mg/dL  2021 Unkn

own

 

          Comp Metabolic Hdz103    Creat               0.8 mg/dL 2021 Unkn

own

 

          Comp Metabolic Duw815    eGFR                71 ml/min/1.73m2 20

21 Unknown

 

          Comp Metabolic Mie950    BUN                 25 mg/dL  2021 Unkn

own

 

          Comp Metabolic Npc940    B/C Ratio           30.9 Ratio 2021 Unk

nown

 

          Comp Metabolic Xhr001    CALCIUM             8.7 mg/dL 2021 Unkn

own

 

          Comp Metabolic Ora165    ALK PHOS            61 U/L    2021 Unkn

own

 

          Comp Metabolic Pcy624    AST(SGOT)           17 U/L    2021 Unkn

own

 

          Comp Metabolic Bse715    ALT(SGPT)           10 U/L    2021 Unkn

own

 

          Comp Metabolic Gpp745    BILI T              0.6 mg/dL 2021 Unkn

own

 

          Comp Metabolic Xcl685    ALBUMIN             3.7 g/dL  2021 Unkn

own

 

          Comp Metabolic Crc921    TPRO                6.1 g/dL  2021 Unkn

own

 

          Comp Metabolic Rfj852    GLOB                2.4 g/dL  2021 Unkn

own

 

          Comp Metabolic Cfg188    A/G Ratio           1.6 Ratio 2021 Unkn

own

 

          Comp Metabolic Tyn261    Osmo                285 mOsmo 2021 Unkn

own

 

          B12       Vei181    B12                 >1500.00 pg/ml 2021 Unkn

own

 

          Free T4   Joo584    FREE T4             1.23 ng/dL 2021 Unknown

 

          Tibc      Ord40     Iron                32 ug/dl  2021 Unknown

 

          Tibc      Ord40     UIBC                402 ug/dL 2021 Unknown

 

          Tibc      Ord40     TIBC                434 ug/dL 2021 Unknown

 

          Tibc      Ord40     Fe-%Sat             7.4 %     2021 Unknown

 

          Ferritin  Ord22     FERRITIN            27.6 ng/mL 2021 Unknown

 

          B12       Dnl840    B12                 187.00 pg/ml 2021 Unknow

n

 

          Comp Metabolic Wou619    NA                  139 mEq/L 2021 Unkn

own

 

          Comp Metabolic Yfx543    K                   4.1 mEq/L 2021 Unkn

own

 

          Comp Metabolic Zap236    CL                  103 mEq/L 2021 Unkn

own

 

          Comp Metabolic Hmq316    CO2                 27.0 mEq/L 2021 Unk

nown

 

          Comp Metabolic Ttl724    ANION GAP           13        2021 Unkn

own

 

          Comp Metabolic Lex410    GLUCOSE             79 mg/dL  2021 Unkn

own

 

          Comp Metabolic Jxv498    Creat               0.7 mg/dL 2021 Unkn

own

 

          Comp Metabolic Nzd523    eGFR                80 ml/min/1.73m2 20

21 Unknown

 

          Comp Metabolic Ycu285    BUN                 21 mg/dL  2021 Unkn

own

 

          Comp Metabolic Kem088    B/C Ratio           28.8 Ratio 2021 Unk

nown

 

          Comp Metabolic Dch651    CALCIUM             8.7 mg/dL 2021 Unkn

own

 

          Comp Metabolic Djh700    ALK PHOS            74 U/L    2021 Unkn

own

 

          Comp Metabolic Hhg775    AST(SGOT)           15 U/L    2021 Unkn

own

 

          Comp Metabolic Mbq575    ALT(SGPT)           17 U/L    2021 Unkn

own

 

          Comp Metabolic Nou246    BILI T              0.4 mg/dL 2021 Unkn

own

 

          Comp Metabolic Qqj481    ALBUMIN             3.5 g/dL  2021 Unkn

own

 

          Comp Metabolic Hns254    TPRO                5.9 g/dL  2021 Unkn

own

 

          Comp Metabolic Kjd563    GLOB                2.4 g/dL  2021 Unkn

own

 

          Comp Metabolic Cye250    A/G Ratio           1.4 Ratio 2021 Unkn

own

 

          Comp Metabolic Vaq291    Osmo                279 mOsmo 2021 Unkn

own

 

          Cbc With Differential Ord2      WBC                 7.63 K/ul 20

21 Unknown

 

          Cbc With Differential Ord2      RBC                 3.38 M/ul 20

21 Unknown

 

          Cbc With Differential Ord2      HGB                 9.4 g/dl  20

21 Unknown

 

          Cbc With Differential Ord2      HCT                 31.5 %    20

21 Unknown

 

          Cbc With Differential Ord2      Neut%               52.6 %    20

21 Unknown

 

          Cbc With Differential Ord2      MCV                 93.2 fl   20

21 Unknown

 

          Cbc With Differential Ord2      Lymph%              33.8 %    20

21 Unknown

 

          Cbc With Differential Ord2      MCH                 27.8 pg   20

21 Unknown

 

          Cbc With Differential Ord2      Mono%               7.9 %     20

21 Unknown

 

          Cbc With Differential Ord2      MCHC                29.8 pg   20

21 Unknown

 

          Cbc With Differential Ord2      Eos%                5.4 %     20

21 Unknown

 

          Cbc With Differential Ord2      PLT                 329 K/ul  20

21 Unknown

 

          Cbc With Differential Ord2      Baso%               0.3 %     20

21 Unknown

 

          Cbc With Differential Ord2      RDW                 15.2 %    20

21 Unknown

 

          Cbc With Differential Ord2      Neut ABS#           4.02 K/ul 20

21 Unknown

 

          Cbc With Differential Ord2      Lymph ABS#           2.58 K/ul 

021 Unknown

 

          Cbc With Differential Ord2      Mono ABS#           0.6 K/ul  20

21 Unknown

 

          Cbc With Differential Ord2      Eos ABS#            0.4 K/ul  20

21 Unknown

 

          Cbc With Differential Ord2      Baso ABS#           0.0 K/ul  20

21 Unknown







Procedures





                    Procedure           Codes               Date

 

                    THER/PROPH/DIAG INJ SC/IM CPT-4: 96134        2021

 

                    VITAMIN B12 INJECTION 1000 mcg CPT-4:         

 

                    THER/PROPH/DIAG INJ SC/IM CPT-4: 90610        2021







Vital Signs





                          Date                      Vital

 

                2021      SpO2: 96%       SpO2: 87%       SpO2: 94%

 

                2021      Blood Pressure 1: 138/82 Code: 8480-6 BMI: 33.5 

Code: 57944-6 Heart 

Rate 1: 74 bpm      Height: 5'3" Code: 8302-2 SpO2: 98%           Temperature: 3

6.2 (C) / 97.1 

(F)                                     Weight: 189 lbs  Code: 46805-2

 

                06/15/2021      Blood Pressure 1: 134/80 Code: 8480-6 Heart Rate

 1: 77 bpm Height: 

5'3" Code: 8302-2         SpO2: 93%                 Temperature: 36.3 (C) / 97.3

 (F)

 

                2021      Blood Pressure 1: 136/88 Code: 8480-6 Heart Rate

 1: 60 bpm Height:  

Code: 8302-2        SpO2: 93%           Temperature: 36.3 (C) / 97.3 (F) Weight:

 174 lbs  Code: 

67002-8

 

                2021      Blood Pressure 1: 164/92 Code: 8480-6 BMI: 31.4 

Code: 73188-2 Heart 

Rate 1: 67 bpm      Height: 5'3" Code: 8302-2 SpO2: 94%           Temperature: 3

6.2 (C) / 97.1 

(F)                                     Weight: 177 lbs  Code: 36201-9

 

                2021      Blood Pressure 1: 134/72 Code: 8480-6 BMI: 30.8 

Code: 06793-1 Heart 

Rate 1: 74 bpm  Height: 5'3" Code: 8302-2 Respiratory Rate: 18 bpm SpO2: 95%    

   

Temperature: 36.3 (C) / 97.4 (F)        Weight: 174 lbs  Code: 20447-0

 

                10/15/2020      Blood Pressure 1: 202/94 Code: 8480-6 BMI: 30.6 

Code: 45118-6 Heart 

Rate 1: 73 bpm  Height: 5'3" Code: 8302-2 Respiratory Rate: 17 bpm SpO2: 91%    

   

Temperature: 36.8 (C) / 98.2 (F)        Weight: 173 lbs  Code: 02667-9

 

                2020      Blood Pressure 1: 144/90 Code: 8480-6 BMI: 29.4 

Code: 43575-7 Heart 

Rate 1: 72 bpm      Height: 5'3" Code: 8302-2 SpO2: 98%           Temperature: 3

6.7 (C) / 98.0 

(F)                                     Weight: 166 lbs  Code: 77597-2







Functional Status

No Functional Status data



Reason For Visit





                    Reason For Visit    Effective Dates     Notes

 

                    hypertension        2021           

 

                    hypertension        06/15/2021           

 

                    hypertension        2021           

 

                    Hospital Follow Up  2021           

 

                    shortness of breath 2021           

 

                    shortness of breath 10/15/2020           

 

                    Hospital Follow Up  2020           







Encounters





             Encounter    Performer    Location     Codes        Date

 

                                        [id number]) [id number] EST. PATIENT, LEVEL I

Diagnosis: CHF (congestive heart failure)[ICD10: I50.9]

Diagnosis: On supplemental oxygen therapy[ICD10: Z99.81]

Diagnosis: Panlobular emphysema[ICD10: J43.1] [facility]                       CPT-4: 56719              2021

 

                                        [id number]) 84399 EST. PATIENT, LEVEL IV

Diagnosis: Vitamin B12 deficiency (dietary) anemia[ICD10: D51.8]

Diagnosis: Essential (primary) hypertension[ICD10: I10]

Diagnosis: Atrophy of thyroid (acquired)[ICD10: E03.4] [facility]          CPT-4: 55954              2021

 

                                        [id number]) [id number] EST. PATIENT, LEVEL IV

Diagnosis: Essential (primary) hypertension[ICD10: I10]

Diagnosis: Vitamin B12 deficiency (dietary) anemia[ICD10: D51.8]

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

Diagnosis: Localized edema[ICD10: R60.0] Franca mejia MD, [facility]

                          CPT-4: 44175              06/15/2021

 

                                        [id number]) [id number] EST. PATIENT, LEVEL IV

Diagnosis: CHF (congestive heart failure)[ICD10: I50.9]

Diagnosis: Essential (primary) hypertension[ICD10: I10]

Diagnosis: Vitamin B12 deficiency (dietary) anemia[ICD10: D51.8] Franca Souza MD, [facility] CPT-4: 78159        2021

 

                                        [id number]) 28725 EST. PATIENT, LEVEL IV

Diagnosis: Acute on chronic diastolic congestive heart failure[ICD10: I50.33]

Diagnosis: Anemia[ICD10: D64.9]

Diagnosis: Essential (primary) hypertension[ICD10: I10] Franca Souza MD, [facility]          CPT-4: 27243              2021

 

                                        [id number]) 25241 EST. PATIENT, LEVEL IV

Diagnosis: Essential (primary) hypertension[ICD10: I10]

Diagnosis: CHF (congestive heart failure)[ICD10: I50.9]

Diagnosis: Atrophy of thyroid (acquired)[ICD10: E03.4] Kimberly Souza MD, [facility]          CPT-4: 22657              2021

 

                                        [id number]) 76756 EST. PATIENT, LEVEL IV

Diagnosis: Essential (primary) hypertension[ICD10: I10]

Diagnosis: CHF (congestive heart failure)[ICD10: I50.9]

Diagnosis: On supplemental oxygen therapy[ICD10: Z99.81]

Diagnosis: Atrophy of thyroid (acquired)[ICD10: E03.4] Kimberly Souza MD, [facility]          CPT-4: 74044              10/15/2020

 

                                        OFFICE  VISIT, NEW - LEVEL 3

Diagnosis: CHF (congestive heart failure)[ICD10: I50.9]

Diagnosis: Dementia without behavioral disturbance, unspecified dementia 
type[ICD10: F03.90] [facility] CPT-4: 04764    







Plan of Care





             Planned Activity Notes        Codes        Status       Date

 

             Patient Education: Patient Medication Summary                      

     Completed    10/25/2021

 

             Appointment:                            Nurse Visit  2021

 

             Patient Education: Patient Medication Summary                      

     Completed    2021

 

                          Visit Plan:               Hypertension - well controll

ed - continue with current medications,

continue with no added salt diet. Pt has been encouraged to exercise daily. The 
pt has been advised to call the office if there are any acute concerns about 
change in blood pressure readings at home. Hypothyroidism - pt with chronic 
hypothyroidism, continue with current medication, will monitor pt for signs or 
symptoms of lack of adequate supplementation. Pt is to continue with current 
dose of medication unless directed otherwise. Check labs at regular intervals q 
3 months or q 6 months based on previous levels of control. B12 def -injection 
today

                                                            2021

 

                                        Appointment: Franca Urbina 

WPtel:[phone]

                                        [facility] [place]

                                              (30 min) Complex 2021

 

             Patient Education: Patient Medication Summary                      

     Completed    2021

 

             Appointment:                            Injection    2021

 

             Patient Education: Patient Medication Summary                      

     Completed    2021

 

             Patient Education: Patient Medication Summary                      

     Completed    2021

 

             Care Plan: Cbc With Differential                           Pending 

     2021

 

             Care Plan: Comp Metabolic                           Pending      

 

             Care Plan: Tsh                           Pending      2021

 

             Care Plan: Magnesium                           Pending      

021

 

             Care Plan: Free T4                           Pending      

 

                          Visit Plan:               Hypertension - well controll

ed - continue with current medications,

continue with no added salt diet. Pt has been encouraged to exercise daily. The 
pt has been advised to call the office if there are any acute concerns about 
change in blood pressure readings at home. Anemia- recheck labs -continue b12 
injections Edema - pt has been advised to elevate legs to prevent dependent 
edema, compression has been recommended to help to naturally decrease peripheral
edema. Diuretic use has been discussed and pt has been instructed in appropriate
use of such medication as necessary to further attempt to reduce peripheral 
edema. Hypothyroidism -check levels with next labs

                                                            06/15/2021

 

                                        Appointment: Franca Urbina 

WPtel:[phone]

                                        [address] [facility]

[place]                                              (30 min) Complex 06/15/2021

 

             Patient Education: Patient Medication Summary                      

     Completed    06/15/2021

 

                                        Appointment: Franca Urbina 

WPtel:[phone]

                                        [address] [facility]

[place]                                              (30 min) Complex 2021

 

                                        Appointment: Kimberly Souza 

WPtel:[phone]

                                        [address] [facility]

[place]                                              (15 min) Moderate 04/15/2021

 

                          Visit Plan:               Hypertension - well controll

ed - continue with current medications,

continue with no added salt diet. Pt has been encouraged to exercise daily. The 
pt has been advised to call the office if there are any acute concerns about 
change in blood pressure readings at home. CHF - congestive heart failure - Pt 
has Chronic congestive heart failure - and is currently fairly well maintained 
on the current medications. Today there is no change in the treatment course. If
symptoms worsen, increase edema or more that 2-3 pound weight gain over a week 
without improvement in the symptoms with a decrease in the sodium of the diet, 
then the pt is to have the office alerted. Anemia- continue with B12 injections 
per 

                                                            2021

 

                                        Appointment: Franca Urbina 

WPtel:[phone]

                                        [facility] [place]

[place]                                              (30 min) Complex 2021

 

             Patient Education: Patient Medication Summary                      

     Completed    2021

 

                          Visit Plan:               Acute on chronic CHF - patie

nt refuses cardiology -continue with 

oxygen at all times- patient did not wear oxygen to appt- patient and sister 
verbalized understanding of plan. Did recommend follow up with cardiology due to
recurrent hospitalizations for CHF and patient continues to refuse Anemia- 
repeat labs today HTN -controlled- no change in medications today

                                                            2021

 

                                        Appointment: Franca Urbina 

WPtel:[phone]

                                        [address] [facility] [place]

[place]                                              (30 min) Complex 2021

 

             Patient Education: Patient Medication Summary                      

     Completed    2021

 

             Patient Education: Patient Medication Summary                      

     Completed    2021

 

                          Visit Plan:               Chronic Congestive heart sheryl

lure - symptoms stable on lasix -pt has

refused referral to Cardiology - she is not interested in any other physician 
and will not go to another specialist per her report - pt to continue with 
chronic oxygen therapy. Hypertension - well controlled - continue with current 
medications, continue with no added salt diet. Pt has been encouraged to 
exercise daily. The pt has been advised to call the office if there are any 
acute concerns about change in blood pressure readings at home. Hypothyroidism -
pt with chronic hypothyroidism, continue with current medication, will monitor 
pt for signs or symptoms of lack of adequate supplementation. Pt is to continue 
with current dose of medication unless directed otherwise. Check labs at regular
intervals q 3 months or q 6 months based on previous levels of control.

                                                            2021

 

             Patient Education: Patient Medication Summary                      

     Completed    2021

 

                                        Appointment: Kimberly Souza 

WPtel:[phone]

                                        [facility]

                                              (30 min) Complex 2020

 

                          Visit Plan:               Chronic Congestive heart sheryl

lure - symptoms stable on lasix - need 

to initiate referral to Dr. Mendoza - if not already done - pt to continue with 
chronic oxygen therapy - due to her weakness and need for easier to transport of
the oxygen from place to place, I have recommended that she needs a portable 
oxygen concentrator. She is too weak to transport large oxygen bottles from 
place to place and is too weak to move them up the stairs in her home. She has 
tripped over her oxygen tubing from the large oxygen concentrator in her house, 
therefore the extra long tubing is not safe to have in her home. She also has 
trouble getting the oxygen tanks moved from house to vehicles and around places 
if she goes shopping. Hypertension - uncontrolled - the patient's medications 
have been modified as documented in the visit note. The patient has been 
counseled to cut back on salt in diet for a no added salt diet, low fat diet, 
start an exercise program with low weight bearing exercises and higher aerobic 
activity for heart health. The patient is to check blood pressure readings as an
outpatient and either fax, call, or email the readings to the office next week 
for practitioner to review. The pt is to call for acute concerns. Increase 
losartan from 50mg daily to 50mg twice daily. Hypothyroidism - pt with chronic 
hypothyroidism, continue with current medication, will monitor pt for signs or 
symptoms of lack of adequate supplementation. Pt is to continue with current 
dose of medication unless directed otherwise. Check labs at regular intervals q 
3 months or q 6 months based on previous levels of control. Fasting labs to be 
done to be drawn by home health.

                                                            10/15/2020

 

                                        Appointment: Kimberly Souza 

WPtel:[phone]

                                        [address] [facility]

[place]                                              (15 min) Moderate 10/15/2020

 

             Patient Education: Patient Medication Summary                      

     Completed    10/15/2020

 

                          Visit Plan:               CHF - congestive heart failu

re - Pt has Chronic congestive heart 

failure - and is currently fairly well maintained on the current medications. 
Today there is no change in the treatment course. If symptoms worsen, increase 
edema or more that 2-3 pound weight gain over a week without improvement in the 
symptoms with a decrease in the sodium of the diet, then the pt is to have the 
office alerted. Dementia - Pt with slowly progressive pattern. I have discussed 
with pt and family the prognosis of this disease state and the need for the 
family to anticipate further decline with behavior changes. Continue with 
current plan of treatment.

                                                            2020

 

             Patient Education: Patient Medication Summary                      

     Completed    2020







Instructions





                          Comment                   Date

 

                                        . Hypertension - well controlled - pop

nue with current medications, continue 

with no added salt diet.  Pt has been encouraged to exercise daily.

The pt has been advised to call the office if there are any acute concerns about
change in blood pressure readings at home.



Hypothyroidism - pt with chronic hypothyroidism, continue with current 
medication, will monitor pt for signs or symptoms of lack of adequate 
supplementation.  Pt is to continue with current dose of medication unless 
directed otherwise.  Check labs at regular intervals q 3 months or q 6 months 
based on previous levels of control.



B12 def -injection today 

                                        2021

 

                                        HAVE HOME HEALTH DRAW LABS WHEN THEY DO 

NEXT B12 INJECTION - CBC, CMP, TSH, FREE

T4, BNP, MAGNESIUM . Hypertension - well controlled - continue with current 
medications, continue with no added salt diet.  Pt has been encouraged to 
exercise daily.

The pt has been advised to call the office if there are any acute concerns about
change in blood pressure readings at home.



Anemia- recheck labs -continue b12 injections 



Edema - pt has been advised to elevate legs to prevent dependent edema, 
compression has been recommended to help to naturally decrease peripheral edema.
 Diuretic use has been discussed and pt has been instructed in appropriate use 
of such medication as necessary to further attempt to reduce peripheral edema.



Hypothyroidism -check levels with next labs  06/15/2021

 

                                        CONTINUE LOW SODIUM DIET



CONTINUE HOME HEALTH WITH [facility] B12 INJECTIONS



FOLLOW UP WITH DR OVALLE FOR PFTS AND CT SCAN

                                        . Hypertension - well controlled - pop

nue with current medications, continue 

with no added salt diet.  Pt has been encouraged to exercise daily.

The pt has been advised to call the office if there are any acute concerns about
change in blood pressure readings at home.



CHF - congestive heart failure - Pt has Chronic congestive heart failure - and 
is currently fairly well maintained on the current medications.  Today there is 
no change in the treatment course.  If symptoms worsen, increase edema or more 
that 2-3 pound weight gain over a week without improvement in the symptoms with 
a decrease in the sodium of the diet, then the pt is to have the office alerted.



Anemia- continue with B12 injections per  

                                        2021

 

                                        . Acute on chronic CHF - patient refuses

 cardiology -continue with oxygen at all

times- patient did not wear oxygen to appt- patient and sister verbalized 
understanding of plan. Did recommend follow up with cardiology due to recurrent 
hospitalizations for CHF and patient continues to refuse 



Anemia- repeat labs today 



HTN -controlled- no change in medications today  2021

 

                                        . Chronic Congestive heart failure - sym

ptoms stable on lasix  -pt has refused 

referral to Cardiology - she is not interested in any other physician and will 
not go to another specialist per her report - pt to continue with chronic oxygen
therapy.



Hypertension - well controlled - continue with current medications, continue 
with no added salt diet.  Pt has been encouraged to exercise daily.

The pt has been advised to call the office if there are any acute concerns about
change in blood pressure readings at home.



Hypothyroidism - pt with chronic hypothyroidism, continue with current 
medication, will monitor pt for signs or symptoms of lack of adequate 
supplementation.  Pt is to continue with current dose of medication unless 
directed otherwise.  Check labs at regular intervals q 3 months or q 6 months 
based on previous levels of control.

                                        2021

 

                                        . Chronic Congestive heart failure - sym

ptoms stable on lasix  - need to 

initiate referral to Dr. Mendoza - if not already done - pt to continue with 
chronic oxygen therapy - due to her weakness and need for easier to transport of
the oxygen from place to place, I have recommended that she needs a portable 
oxygen concentrator.  She is too weak to transport large oxygen bottles from 
place to place and is too weak to move them up the stairs in her home.  She has 
tripped over her oxygen tubing from the large oxygen concentrator in her house, 
therefore the extra long tubing is not safe to have in her home.  She also has 
trouble getting the oxygen tanks moved from house to vehicles and around places 
if she goes shopping.



Hypertension - uncontrolled - the patient's medications have been modified as 
documented in the visit note.  The patient has been counseled to cut back on 
salt in diet for a no added salt diet, low fat diet, start an exercise program 
with low weight bearing exercises and higher aerobic activity for heart health. 


The patient is to check blood pressure readings as an outpatient and either fax,
call, or email the readings to the office next week for practitioner to review.

The pt is to call for acute concerns.

Increase losartan from 50mg daily to 50mg twice daily.



Hypothyroidism - pt with chronic hypothyroidism, continue with current 
medication, will monitor pt for signs or symptoms of lack of adequate 
supplementation.  Pt is to continue with current dose of medication unless 
directed otherwise.  Check labs at regular intervals q 3 months or q 6 months 
based on previous levels of control.



Fasting labs to be done to be drawn by [facility] [facility]. 10/15/2020

 

                                        Will set up with [facility]



pt is to consider moving to assisted living



pt is to get a life alert bracelet. CHF - congestive heart failure - Pt has 
Chronic congestive heart failure - and is currently fairly well maintained on 
the current medications.  Today there is no change in the treatment course.  If 
symptoms worsen, increase edema or more that 2-3 pound weight gain over a week 
without improvement in the symptoms with a decrease in the sodium of the diet, 
then the pt is to have the office alerted.



Dementia - Pt with slowly progressive pattern.  I have discussed with pt and 
family the prognosis of this disease state and the need for the family to 
anticipate further decline with behavior changes.  Continue with current plan of
treatment.

                                        2020







Medical Equipment

No Medical Equipment data



Health Concerns Section

Health Concerns data not found



Goals Section

Goals data not found



Interventions Section

Interventions data not found



Health Status Evaluations/Outcomes Section

Health Status Evaluations/Outcomes data not found



Advance Directives

No Advance Directive data

## 2022-01-03 NOTE — XMS REPORT
CCD document using C-CDA

                             Created on: 2021



Tierra Rizvi

External Reference #: [id number]

: 1934

Sex: Female



Demographics





                          Address                   [address]  [place]  [address]

 

                          Home Phone                [phone]

 

                          Preferred Language        Unknown

 

                          Marital Status            Unknown

 

                          Worship Affiliation     Unknown

 

                          Race                      White

 

                          Ethnic Group              Not  or 





Author





                          Author                    Tierra Moreno

 

                          Organization              Kimberly Souza MD, [facility]

 

                          Address                   [address] [place]  [address]



 

                          Phone                     [phone]







Care Team Providers





                    Care Team Member Name Role                Phone

 

                    Kimberly Souza     PP                  Unavailable

 

                          CCM                       Unavailable







Summary Purpose

Interface Exchange



Insurance Providers





             Payer name   Policy type / Coverage type Covered party ID Effective

 Begin Date 

Effective End Date

 

             [employer] Medicare Part B Medicare Part B [id number]  Unknown      Unkno

wn

 

             [facility] Medicare Part B [id number] Unkno

wn      Unknown







Family history





Sister



                          Diagnosis                 Age At Onset

 

                          Arthritis                 Unknown

 

                          Hypertension              Unknown

 

                          Anemia                    Unknown

 

                          Diabetes mellitus Type 2  Unknown

 

                          Osteoporosis              Unknown







Son



                          Diagnosis                 Age At Onset

 

                          Myocardial infarction     Unknown

 

                          Hypertension              Unknown

 

                          Diabetes mellitus Type 2  Unknown







Father



                          Diagnosis                 Age At Onset

 

                          Cancer                    Unknown

 

                          Alcoholism                Unknown

 

                          kidney disease            Unknown







Mother



                          Diagnosis                 Age At Onset

 

                          Cancer                    Unknown







Brother



                          Diagnosis                 Age At Onset

 

                          Hypertension              Unknown

 

                          Cancer                    Unknown

 

                          Alcoholism                Unknown

 

                          Diabetes mellitus Type 2  Unknown

 

                          kidney disease            Unknown

 

                          Myocardial infarction     Unknown







Social History





                Social History Element Codes           Description     Effective

 Dates

 

                Marital status  Unknown                  2020

 

                Employment      Unknown         Retired         2020

 

                    Tobacco history     SNOMED CT: 7746255  Quit over 10 years a

go

                          2020

 

                Alcohol history SNOMED CT: 606663140 Never drinks alcohol 2020







Allergies, Adverse Reactions, Alerts





           Substance  Reaction   Codes      Entered Date Inactivated Date Status

 

           Penicillin rash,      Unknown    2020 No Inactive Date Active

 

             * OTHER REACTION - SEE ANSWER BOX Tetracycline- Rash Unknown      0

2020   No 

Inactive Date                           Active







Problems





                Condition       Codes           Effective Dates Condition Status

 

                          Anemia                    ICD-10: D64.9

ICD-9: 285.9              2021                Active

 

                spinal stenosis Unknown         10/25/2021      Active

 

                          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

ication ICD-10: M48.062

ICD-9: 724.03             10/25/2021                Active

 

                          CHF (congestive heart failure) ICD-10: I50.9

ICD-9: 428.0              2020                Active

 

                          On supplemental oxygen therapy ICD-10: Z99.81

ICD-9: V46.2              10/15/2020                Active

 

                          Panlobular emphysema      ICD-10: J43.1

ICD-9: 492.8              2021                Active

 

                          Atrophy of thyroid (acquired) ICD-10: E03.4

ICD-9: 244.8              10/15/2020                Active

 

                          Essential (primary) hypertension ICD-10: I10

ICD-9: 401.1              10/15/2020                Active

 

                          Vitamin B12 deficiency (dietary) anemia ICD-10: D51.8

ICD-9: 281.1              2021                Active

 

                          Localized edema           ICD-10: R60.0

ICD-9: 782.3              06/15/2021                Active

 

                          Acute on chronic diastolic congestive heart failure IC

D-10: I50.33

ICD-9: 428.33             2021                Active

 

                          Dementia without behavioral disturbance, unspecified d

ementia type ICD-10: 

F03.90

ICD-9: 294.20             2020                Active







Medications





          Medication Codes     Instructions Start Date Stop Date Status    Fill 

Instructions

 

                losartan 50 mg tablet RxNorm: 861296  Take 1 Tablet(s) Oral two 

times a day 

12/10/2021          2022          Active               

 

                    cyanocobalamin (vit B-12) 1,000 mcg/mL injection solution Rx

Norm: 957373      1 

Milliliter(s) Injection monthly 2021      Active          

will need 

syringe/needle for monthly injection dx b12 deficiency

 

                levothyroxine 112 mcg tablet RxNorm: 340581  Take 1 Tablet(s) Or

al every day 

2021          Active               

 

                    hydrocodone 5 mg-acetaminophen 325 mg tablet RxNorm: 760038 

     Take 1 Tablet(s) 

Oral three times a day as needed for pain 2021      Active

           

 

                    cyanocobalamin (vit B-12) 1,000 mcg/mL injection solution Rx

Norm: 660796      1 

Milliliter(s) Injection monthly 2021      Inactive        

will need 

syringe/needle for monthly injection dx b12 deficiency

 

                    cyanocobalamin (vit B-12) 1,000 mcg/mL injection solution Rx

Norm: 039865      1 

Milliliter(s) Injection monthly 2021      Inactive        

will need 

syringe/needle for monthly  injection dx b12 deficiency

 

                levothyroxine 112 mcg tablet RxNorm: 807355  1 Tablet(s) Oral ev

berta day 

2021          Inactive             

 

                    hydrocodone 5 mg-acetaminophen 325 mg tablet RxNorm: 681199 

     Take 1 Tablet(s) 

Oral three times a day as needed for pain 10/25/2021      10/25/2021      Inacti

ve         

 

                          albuterol sulfate 2.5 mg/3 mL (0.083 %) solution for n

ebulization RxNorm: 398079

             USE 1 VIAL IN NEBULIZER TWICE DAILY 10/19/2021   2021   Inact

maryjo      

 

                          albuterol sulfate 2.5 mg/3 mL (0.083 %) solution for n

ebulization RxNorm: 125000

             USE 1 VIAL IN NEBULIZER TWICE DAILY 10/19/2021   10/19/2021   Inact

maryjo      

 

                levothyroxine 112 mcg tablet RxNorm: 829151  1 Tablet(s) Oral ev

berta day 

10/07/2021          10/07/2021          Inactive             

 

                levothyroxine 112 mcg tablet RxNorm: 342411  1 Tablet(s) Oral ev

berta day 

10/07/2021          10/07/2021          Inactive             

 

                levothyroxine 112 mcg tablet RxNorm: 778851  1 Tablet(s) Oral ev

berta day 

2021          10/07/2021          Inactive             

 

                    cyanocobalamin (vit B-12) 1,000 mcg/mL injection solution Rx

Norm: 298736      Take 

Milliliter(s) Injection 2021      Inactive         

 

                    cyanocobalamin (vit B-12) 1,000 mcg/mL injection solution Rx

Norm: 611585      Take 

Milliliter(s) Injection 2021      Inactive         

 

                          albuterol sulfate 2.5 mg/3 mL (0.083 %) solution for n

ebulization RxNorm: 599129

             1 Unit Dose Inhalation two times a day DX J43.1 2021   Inactive      

 

                losartan 50 mg tablet RxNorm: 615326  1 Tablet(s) Oral two times

 a day 2021          Inactive             

 

                          albuterol sulfate 2.5 mg/3 mL (0.083 %) solution for n

ebulization RxNorm: 136129

             1 Unit Dose Inhalation two times a day DX J43.1 2021   Inactive      

 

                          albuterol sulfate 2.5 mg/3 mL (0.083 %) solution for n

ebulization RxNorm: 595617

             1 Unit Dose Inhalation two times a day 2021   In

active      

 

                          albuterol sulfate 2.5 mg/3 mL (0.083 %) solution for n

ebulization RxNorm: 948546

             1 Unit Dose Inhalation two times a day 2021   In

active      

 

             Norvasc 5 mg tablet RxNorm: 284677 1 Tablet(s) Oral every day 2022                Active                     

 

             Norvasc 5 mg tablet RxNorm: 142675 1 Tablet(s) Oral every day 2021                Inactive                   

 

                    metoprolol succinate ER 25 mg tablet,extended release 24 hr 

RxNorm: 480102      1 

Tablet(s) Oral every day 2021      No Stop Date    Active           

 

                    Ventolin HFA 90 mcg/actuation aerosol inhaler RxNorm: 095603

      1-2 Puff(s) 

Inhalation Every 4 hrs as needed 2021      No Stop Date    Active         

  

 

             furosemide 20 mg tablet RxNorm: 081955 1 Tablet(s) Oral every day 0

2021   No 

Stop Date                 Active                     

 

                    furosemide 40 mg tablet RxNorm: 947386      1 Tablet(s) Oral

 as directed 40mg BID x 5

days then 40mg in am and 20mg afternoon thereafter 2020          

Inactive                                give qty sufficient

 

                    potassium chloride ER 10 mEq tablet,extended release RxNorm:

 721068      1 Tablet(s) 

Oral as directed 1 tab bid x 5 days then resume daily 2020          

Inactive                                qty sufficient

 

                    potassium bicarbonate-citric acid 10 mEq effervescent tablet

 RxNorm: 2271867     1 

Tablet(s) Oral every day 10/15/2020      2020      Inactive         

 

                levothyroxine 125 mcg tablet RxNorm: 693946  1 Tablet(s) Oral ev

berta day 

10/15/2020          2021          Inactive             

 

                losartan 50 mg tablet RxNorm: 981350  1 Tablet(s) Oral two times

 a day 10/15/2020

                    2021          Inactive             

 

             furosemide 40 mg tablet RxNorm: 672580 1 Tablet(s) Oral every day 1

0/15/2020   

2020                Inactive                   

 

             Zyrtec 10 mg tablet RxNorm: 0997971 1 Tablet(s) Oral every day 10/0

2020   

2021                Inactive                   

 

                levothyroxine 125 mcg tablet RxNorm: 164550  1 Tablet(s) Oral ev

berta day 

10/05/2020          10/14/2020          Inactive             

 

             Zyrtec 10 mg tablet RxNorm: 3098880 1 Tablet(s) Oral every day 10/0

2020   

10/04/2020                Inactive                   

 

                aspirin 81 mg tablet,delayed release RxNorm: 479257  1 Tablet(s)

 Oral every day 

2020          No Stop Date        Active               

 

             Vitamin C 250 mg tablet RxNorm: 236426 1 Tablet(s) Oral every day 0

2020   No 

Stop Date                 Active                     

 

                Vitamin D3 50 mcg (2,000 unit) tablet RxNorm: 216106  1 Tablet(s

) Oral every day 

2020          No Stop Date        Active               

 

                levothyroxine 88 mcg tablet RxNorm: 100582  1 Tablet(s) Oral ledy

ry day 2020

                    10/04/2020          Inactive             

 

             losartan 50 mg tablet RxNorm: 726882 1 Tablet(s) Oral every day 09/

   

10/14/2020                Inactive                   

 

             furosemide 40 mg tablet RxNorm: 693572 1 Tablet(s) Oral every day 0

2020   

10/14/2020                Inactive                   

 

                    potassium bicarbonate-citric acid 10 mEq effervescent tablet

 RxNorm: 7739951     1 

Tablet(s) Oral every day 2020      10/14/2020      Inactive         

 

          Women's Multivitamin oral RxNorm:   oral      2020           Act

maryjo     

 

          Calcium 600 oral RxNorm: 1897 oral      2020           Active   

  

 

          albuterol sulfate inhalation RxNorm: 863879 inhalation 2020     

      Active     







Medication Administered





             Medication   Codes        Instructions Start Date   Status

 

                    cyanocobalamin (vit B-12) 1,000 mcg/mL injection solution Rx

Norm: 092285      

Milliliter                2021                No longer Active

 

                    cyanocobalamin (vit B-12) 1,000 mcg/mL injection solution Rx

Norm: 981477      

Milliliter                2021                No longer Active







Immunizations





             Vaccine      Codes        Dose         Date         Status

 

             Covid-19     CVX: 207                  04/10/2021    

 

             Covid-19     CVX: 2021    

 

             Pneumococcal (Adult) Unknown                   2019    

 

             Zoster       CVX: 121                  2017    







Results





          Observation Observation Code Item      Item Code Result    Date      S

ervice Location

 

          Cbc With Differential Ord2      WBC                 8.78 K/ul 20 Unknown

 

          Cbc With Differential Ord2      RBC                 3.36 M/ul 20 Unknown

 

          Cbc With Differential Ord2      HGB                 8.8 g/dl  20 Unknown

 

          Cbc With Differential Ord2      Neut%               57.3 %    20 Unknown

 

          Cbc With Differential Ord2      HCT                 29.6 %    20 Unknown

 

          Cbc With Differential Ord2      Lymph%              32.8 %    20 Unknown

 

          Cbc With Differential Ord2      MCV                 88.1 fl   20 Unknown

 

          Cbc With Differential Ord2      MCH                 26.2 pg   20 Unknown

 

          Cbc With Differential Ord2      Mono%               7.2 %     20 Unknown

 

          Cbc With Differential Ord2      MCHC                29.7 pg   20 Unknown

 

          Cbc With Differential Ord2      Eos%                2.6 %     20 Unknown

 

          Cbc With Differential Ord2      PLT                 343 K/ul  20 Unknown

 

          Cbc With Differential Ord2      Baso%               0.1 %     20 Unknown

 

          Cbc With Differential Ord2      RDW                 14.8 %    20 Unknown

 

          Cbc With Differential Ord2      Neut ABS#           5.03 K/ul 20 Unknown

 

          Cbc With Differential Ord2      Lymph ABS#           2.88 K/ul 

021 Unknown

 

          Cbc With Differential Ord2      Mono ABS#           0.6 K/ul  20 Unknown

 

          Cbc With Differential Ord2      Eos ABS#            0.2 K/ul  20 Unknown

 

          Cbc With Differential Ord2      Baso ABS#           0.0 K/ul  20 Unknown

 

          Tsh       Ord6      TSH (3rd IS)           1.03 uIU/mL 2021 Unkn

own

 

          Free T4   Cqt294    FREE T4             1.11 ng/dL 2021 Unknown

 

          Cbc With Differential Ord2      WBC                 8.64 K/ul 20

21 Unknown

 

          Cbc With Differential Ord2      RBC                 3.76 M/ul 20

21 Unknown

 

          Cbc With Differential Ord2      HGB                 9.9 g/dl  20

21 Unknown

 

          Cbc With Differential Ord2      HCT                 33.4 %    20

21 Unknown

 

          Cbc With Differential Ord2      Neut%               61.9 %    20

21 Unknown

 

          Cbc With Differential Ord2      Lymph%              28.1 %    20

21 Unknown

 

          Cbc With Differential Ord2      MCV                 88.8 fl   20

21 Unknown

 

          Cbc With Differential Ord2      MCH                 26.3 pg   20

21 Unknown

 

          Cbc With Differential Ord2      Mono%               8.0 %     20

21 Unknown

 

          Cbc With Differential Ord2      Eos%                1.9 %     20

21 Unknown

 

          Cbc With Differential Ord2      MCHC                29.6 pg   20

21 Unknown

 

          Cbc With Differential Ord2      Baso%               0.1 %     20

21 Unknown

 

          Cbc With Differential Ord2      PLT                 332 K/ul  20

21 Unknown

 

          Cbc With Differential Ord2      Neut ABS#           5.35 K/ul 20

21 Unknown

 

          Cbc With Differential Ord2      RDW                 15.2 %    20

21 Unknown

 

          Cbc With Differential Ord2      Lymph ABS#           2.43 K/ul 

021 Unknown

 

          Cbc With Differential Ord2      Mono ABS#           0.7 K/ul  20

21 Unknown

 

          Cbc With Differential Ord2      Eos ABS#            0.2 K/ul  20

21 Unknown

 

          Cbc With Differential Ord2      Baso ABS#           0.0 K/ul  20

21 Unknown

 

          Tsh       Ord6      TSH (3rd IS)           0.21 uIU/mL 2021 Unkn

own

 

          Comp Metabolic Jif742    NA                  141 mEq/L 2021 Unkn

own

 

          Comp Metabolic Vhi099    K                   4.1 mEq/L 2021 Unkn

own

 

          Comp Metabolic Xcc283    CL                  104 mEq/L 2021 Unkn

own

 

          Comp Metabolic Mzr143    CO2                 28.0 mEq/L 2021 Unk

nown

 

          Comp Metabolic Qav714    ANION GAP           13        2021 Unkn

own

 

          Comp Metabolic Gat240    GLUCOSE             78 mg/dL  2021 Unkn

own

 

          Comp Metabolic Crs833    Creat               0.8 mg/dL 2021 Unkn

own

 

          Comp Metabolic Tna664    eGFR                71 ml/min/1.73m2 20

21 Unknown

 

          Comp Metabolic Uxx522    BUN                 25 mg/dL  2021 Unkn

own

 

          Comp Metabolic Ynr874    B/C Ratio           30.9 Ratio 2021 Unk

nown

 

          Comp Metabolic Eyb988    CALCIUM             8.7 mg/dL 2021 Unkn

own

 

          Comp Metabolic Vye461    ALK PHOS            61 U/L    2021 Unkn

own

 

          Comp Metabolic Zct111    AST(SGOT)           17 U/L    2021 Unkn

own

 

          Comp Metabolic Luw257    ALT(SGPT)           10 U/L    2021 Unkn

own

 

          Comp Metabolic Dzv278    BILI T              0.6 mg/dL 2021 Unkn

own

 

          Comp Metabolic Ppd860    ALBUMIN             3.7 g/dL  2021 Unkn

own

 

          Comp Metabolic Ezt488    TPRO                6.1 g/dL  2021 Unkn

own

 

          Comp Metabolic Unx385    GLOB                2.4 g/dL  2021 Unkn

own

 

          Comp Metabolic Ksd730    A/G Ratio           1.6 Ratio 2021 Unkn

own

 

          Comp Metabolic Zsr435    Osmo                285 mOsmo 2021 Unkn

own

 

          B12       Rzx070    B12                 >1500.00 pg/ml 2021 Unkn

own

 

          Free T4   Mxb589    FREE T4             1.23 ng/dL 2021 Unknown

 

          Tibc      Ord40     Iron                32 ug/dl  2021 Unknown

 

          Tibc      Ord40     UIBC                402 ug/dL 2021 Unknown

 

          Tibc      Ord40     TIBC                434 ug/dL 2021 Unknown

 

          Tibc      Ord40     Fe-%Sat             7.4 %     2021 Unknown

 

          Ferritin  Ord22     FERRITIN            27.6 ng/mL 2021 Unknown

 

          B12       Dvb308    B12                 187.00 pg/ml 2021 Unknow

n

 

          Comp Metabolic Gpf148    NA                  139 mEq/L 2021 Unkn

own

 

          Comp Metabolic Tns200    K                   4.1 mEq/L 2021 Unkn

own

 

          Comp Metabolic Jrp787    CL                  103 mEq/L 2021 Unkn

own

 

          Comp Metabolic Zhd909    CO2                 27.0 mEq/L 2021 Unk

nown

 

          Comp Metabolic Ugc189    ANION GAP           13        2021 Unkn

own

 

          Comp Metabolic Lat745    GLUCOSE             79 mg/dL  2021 Unkn

own

 

          Comp Metabolic Jkx475    Creat               0.7 mg/dL 2021 Unkn

own

 

          Comp Metabolic Wbo129    eGFR                80 ml/min/1.73m2 20

21 Unknown

 

          Comp Metabolic Yza531    BUN                 21 mg/dL  2021 Unkn

own

 

          Comp Metabolic Nzn443    B/C Ratio           28.8 Ratio 2021 Unk

nown

 

          Comp Metabolic Hlg103    CALCIUM             8.7 mg/dL 2021 Unkn

own

 

          Comp Metabolic Zym109    ALK PHOS            74 U/L    2021 Unkn

own

 

          Comp Metabolic Jqi754    AST(SGOT)           15 U/L    2021 Unkn

own

 

          Comp Metabolic Ksh696    ALT(SGPT)           17 U/L    2021 Unkn

own

 

          Comp Metabolic Jly369    BILI T              0.4 mg/dL 2021 Unkn

own

 

          Comp Metabolic Ksy474    ALBUMIN             3.5 g/dL  2021 Unkn

own

 

          Comp Metabolic Yoh035    TPRO                5.9 g/dL  2021 Unkn

own

 

          Comp Metabolic Gwg688    GLOB                2.4 g/dL  2021 Unkn

own

 

          Comp Metabolic Yrh165    A/G Ratio           1.4 Ratio 2021 Unkn

own

 

          Comp Metabolic Ask159    Osmo                279 mOsmo 2021 Unkn

own

 

          Cbc With Differential Ord2      WBC                 7.63 K/ul 20

21 Unknown

 

          Cbc With Differential Ord2      RBC                 3.38 M/ul 20

21 Unknown

 

          Cbc With Differential Ord2      HGB                 9.4 g/dl  20

21 Unknown

 

          Cbc With Differential Ord2      HCT                 31.5 %    20

21 Unknown

 

          Cbc With Differential Ord2      Neut%               52.6 %    20

21 Unknown

 

          Cbc With Differential Ord2      MCV                 93.2 fl   20

21 Unknown

 

          Cbc With Differential Ord2      Lymph%              33.8 %    20

21 Unknown

 

          Cbc With Differential Ord2      Mono%               7.9 %     20

21 Unknown

 

          Cbc With Differential Ord2      MCH                 27.8 pg   20

21 Unknown

 

          Cbc With Differential Ord2      MCHC                29.8 pg   20

21 Unknown

 

          Cbc With Differential Ord2      Eos%                5.4 %     20

21 Unknown

 

          Cbc With Differential Ord2      PLT                 329 K/ul  20

21 Unknown

 

          Cbc With Differential Ord2      Baso%               0.3 %     20

21 Unknown

 

          Cbc With Differential Ord2      RDW                 15.2 %    20

21 Unknown

 

          Cbc With Differential Ord2      Neut ABS#           4.02 K/ul 20

21 Unknown

 

          Cbc With Differential Ord2      Lymph ABS#           2.58 K/ul 

021 Unknown

 

          Cbc With Differential Ord2      Mono ABS#           0.6 K/ul  20

21 Unknown

 

          Cbc With Differential Ord2      Eos ABS#            0.4 K/ul  20

21 Unknown

 

          Cbc With Differential Ord2      Baso ABS#           0.0 K/ul  20

21 Unknown







Procedures





                    Procedure           Codes               Date

 

                    THER/PROPH/DIAG INJ SC/IM CPT-4: 07078        2021

 

                    VITAMIN B12 INJECTION 1000 mcg CPT-4:         

 

                    THER/PROPH/DIAG INJ SC/IM CPT-4: 45580        2021







Vital Signs





                          Date                      Vital

 

                2021      SpO2: 94%       SpO2: 87%       SpO2: 96%

 

                2021      Blood Pressure 1: 138/82 Code: 8480-6 BMI: 33.5 

Code: 53441-4 Heart 

Rate 1: 74 bpm      Height: 5'3" Code: 8302-2 SpO2: 98%           Temperature: 3

6.2 (C) / 97.1 

(F)                                     Weight: 189 lbs  Code: 17156-6

 

                06/15/2021      Blood Pressure 1: 134/80 Code: 8480-6 Heart Rate

 1: 77 bpm Height: 

5'3" Code: 8302-2         SpO2: 93%                 Temperature: 36.3 (C) / 97.3

 (F)

 

                2021      Blood Pressure 1: 136/88 Code: 8480-6 Heart Rate

 1: 60 bpm Height:  

Code: 8302-2        SpO2: 93%           Temperature: 36.3 (C) / 97.3 (F) Weight:

 174 lbs  Code: 

01570-4

 

                2021      Blood Pressure 1: 164/92 Code: 8480-6 BMI: 31.4 

Code: 98571-4 Heart 

Rate 1: 67 bpm      Height: 5'3" Code: 8302-2 SpO2: 94%           Temperature: 3

6.2 (C) / 97.1 

(F)                                     Weight: 177 lbs  Code: 01249-2

 

                2021      Blood Pressure 1: 134/72 Code: 8480-6 BMI: 30.8 

Code: 54805-1 Heart 

Rate 1: 74 bpm  Height: 5'3" Code: 8302-2 Respiratory Rate: 18 bpm SpO2: 95%    

   

Temperature: 36.3 (C) / 97.4 (F)        Weight: 174 lbs  Code: 94280-4

 

                10/15/2020      Blood Pressure 1: 202/94 Code: 8480-6 BMI: 30.6 

Code: 50024-2 Heart 

Rate 1: 73 bpm  Height: 5'3" Code: 8302-2 Respiratory Rate: 17 bpm SpO2: 91%    

   

Temperature: 36.8 (C) / 98.2 (F)        Weight: 173 lbs  Code: 39105-0

 

                2020      Blood Pressure 1: 144/90 Code: 8480-6 BMI: 29.4 

Code: 65621-2 Heart 

Rate 1: 72 bpm      Height: 5'3" Code: 8302-2 SpO2: 98%           Temperature: 3

6.7 (C) / 98.0 

(F)                                     Weight: 166 lbs  Code: 02579-1







Functional Status

No Functional Status data



Reason For Visit





                    Reason For Visit    Effective Dates     Notes

 

                    hypertension        2021           

 

                    hypertension        06/15/2021           

 

                    hypertension        2021           

 

                    Hospital Follow Up  2021           

 

                    shortness of breath 2021           

 

                    shortness of breath 10/15/2020           

 

                    Hospital Follow Up  2020           







Encounters





             Encounter    Performer    Location     Codes        Date

 

                                        [id number]) [id number] EST. PATIENT, LEVEL I

Diagnosis: CHF (congestive heart failure)[ICD10: I50.9]

Diagnosis: On supplemental oxygen therapy[ICD10: Z99.81]

Diagnosis: Panlobular emphysema[ICD10: J43.1] [facility]                       CPT-4: 27550              2021

 

                                        [id number]) 53271 EST. PATIENT, LEVEL IV

Diagnosis: Vitamin B12 deficiency (dietary) anemia[ICD10: D51.8]

Diagnosis: Essential (primary) hypertension[ICD10: I10]

Diagnosis: Atrophy of thyroid (acquired)[ICD10: E03.4] [facility]          CPT-4: 47502              2021

 

                                        [phone]) [id number] EST. PATIENT, LEVEL IV

Diagnosis: Essential (primary) hypertension[ICD10: I10]

Diagnosis: Vitamin B12 deficiency (dietary) anemia[ICD10: D51.8]

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

Diagnosis: Localized edema[ICD10: R60.0] Franca mejia MD, [facility]

                          CPT-4: 89523              06/15/2021

 

                                        [id number]) 16383 EST. PATIENT, LEVEL IV

Diagnosis: CHF (congestive heart failure)[ICD10: I50.9]

Diagnosis: Essential (primary) hypertension[ICD10: I10]

Diagnosis: Vitamin B12 deficiency (dietary) anemia[ICD10: D51.8] Franca Souza MD, [facility] CPT-4: 85691        2021

 

                                        [id number]) [id number] EST. PATIENT, LEVEL IV

Diagnosis: Acute on chronic diastolic congestive heart failure[ICD10: I50.33]

Diagnosis: Anemia[ICD10: D64.9]

Diagnosis: Essential (primary) hypertension[ICD10: I10] Franca Souza MD, [facility]          CPT-4: 43735              2021

 

                                        [id number]) [id number] EST. PATIENT, LEVEL IV

Diagnosis: Essential (primary) hypertension[ICD10: I10]

Diagnosis: CHF (congestive heart failure)[ICD10: I50.9]

Diagnosis: Atrophy of thyroid (acquired)[ICD10: E03.4] Kimberly Souza MD, [facility]          CPT-4: 04921              2021

 

                                        [id number]) 98668 EST. PATIENT, LEVEL IV

Diagnosis: Essential (primary) hypertension[ICD10: I10]

Diagnosis: CHF (congestive heart failure)[ICD10: I50.9]

Diagnosis: On supplemental oxygen therapy[ICD10: Z99.81]

Diagnosis: Atrophy of thyroid (acquired)[ICD10: E03.4] Kimberly Souza MD, [facility]          CPT-4: 27316              10/15/2020

 

                                        OFFICE  VISIT, NEW - LEVEL 3

Diagnosis: CHF (congestive heart failure)[ICD10: I50.9]

Diagnosis: Dementia without behavioral disturbance, unspecified dementia 
type[ICD10: F03.90] [facility] CPT-4: 16964    







Plan of Care





             Planned Activity Notes        Codes        Status       Date

 

             Patient Education: Patient Medication Summary                      

     Completed    2021

 

             Care Plan: Ferritin                           Pending      20

 

             Care Plan: TIBC (This includes FE)                           Pendin

g      2021

 

             Patient Education: Patient Medication Summary                      

     Completed    10/25/2021

 

             Appointment:                            Nurse Visit  2021

 

             Patient Education: Patient Medication Summary                      

     Completed    2021

 

                          Visit Plan:               Hypertension - well controll

ed - continue with current medications,

continue with no added salt diet. Pt has been encouraged to exercise daily. The 
pt has been advised to call the office if there are any acute concerns about 
change in blood pressure readings at home. Hypothyroidism - pt with chronic 
hypothyroidism, continue with current medication, will monitor pt for signs or 
symptoms of lack of adequate supplementation. Pt is to continue with current 
dose of medication unless directed otherwise. Check labs at regular intervals q 
3 months or q 6 months based on previous levels of control. B12 def -injection 
today

                                                            2021

 

                                        Appointment: Franca Urbina 

WPtel:[phone]

                                        [facility]

                                              (30 min) Complex 2021

 

             Patient Education: Patient Medication Summary                      

     Completed    2021

 

             Appointment:                            Injection    2021

 

             Patient Education: Patient Medication Summary                      

     Completed    2021

 

             Patient Education: Patient Medication Summary                      

     Completed    2021

 

             Care Plan: Cbc With Differential                           Pending 

     2021

 

             Care Plan: Comp Metabolic                           Pending      

 

             Care Plan: Tsh                           Pending      2021

 

             Care Plan: Magnesium                           Pending      

021

 

             Care Plan: Free T4                           Pending      

 

                          Visit Plan:               Hypertension - well controll

ed - continue with current medications,

continue with no added salt diet. Pt has been encouraged to exercise daily. The 
pt has been advised to call the office if there are any acute concerns about 
change in blood pressure readings at home. Anemia- recheck labs -continue b12 
injections Edema - pt has been advised to elevate legs to prevent dependent 
edema, compression has been recommended to help to naturally decrease peripheral
edema. Diuretic use has been discussed and pt has been instructed in appropriate
use of such medication as necessary to further attempt to reduce peripheral 
edema. Hypothyroidism -check levels with next labs

                                                            06/15/2021

 

                                        Appointment: Franca Urbina 

WPtel:[phone]

                                        [address] [facility]

                                              (30 min) Complex 06/15/2021

 

             Patient Education: Patient Medication Summary                      

     Completed    06/15/2021

 

                                        Appointment: Franca Urbina 

WPtel:[phone]

                                        [address] [facility]

[place]                                              (30 min) Complex 2021

 

                                        Appointment: Kimberly Souza 

WPtel:[phone]

                                        [address] [facility]

[place]                                              (15 min) Moderate 04/15/2021

 

                          Visit Plan:               Hypertension - well controll

ed - continue with current medications,

continue with no added salt diet. Pt has been encouraged to exercise daily. The 
pt has been advised to call the office if there are any acute concerns about 
change in blood pressure readings at home. CHF - congestive heart failure - Pt 
has Chronic congestive heart failure - and is currently fairly well maintained 
on the current medications. Today there is no change in the treatment course. If
symptoms worsen, increase edema or more that 2-3 pound weight gain over a week 
without improvement in the symptoms with a decrease in the sodium of the diet, 
then the pt is to have the office alerted. Anemia- continue with B12 injections 
per HH

                                                            2021

 

                                        Appointment: Franca Urbina 

WPtel:[phone]

                                        [address] [facility]

                                              (30 min) Complex 2021

 

             Patient Education: Patient Medication Summary                      

     Completed    2021

 

                          Visit Plan:               Acute on chronic CHF - patie

nt refuses cardiology -continue with 

oxygen at all times- patient did not wear oxygen to appt- patient and sister 
verbalized understanding of plan. Did recommend follow up with cardiology due to
recurrent hospitalizations for CHF and patient continues to refuse Anemia- 
repeat labs today HTN -controlled- no change in medications today

                                                            2021

 

                                        Appointment: Franca Urbina 

WPtel:[phone]

                                        [address] [facility]

[place]                                              (30 min) Complex 2021

 

             Patient Education: Patient Medication Summary                      

     Completed    2021

 

             Patient Education: Patient Medication Summary                      

     Completed    2021

 

                          Visit Plan:               Chronic Congestive heart sheryl

lure - symptoms stable on lasix -pt has

refused referral to Cardiology - she is not interested in any other physician 
and will not go to another specialist per her report - pt to continue with 
chronic oxygen therapy. Hypertension - well controlled - continue with current 
medications, continue with no added salt diet. Pt has been encouraged to 
exercise daily. The pt has been advised to call the office if there are any 
acute concerns about change in blood pressure readings at home. Hypothyroidism -
pt with chronic hypothyroidism, continue with current medication, will monitor 
pt for signs or symptoms of lack of adequate supplementation. Pt is to continue 
with current dose of medication unless directed otherwise. Check labs at regular
intervals q 3 months or q 6 months based on previous levels of control.

                                                            2021

 

             Patient Education: Patient Medication Summary                      

     Completed    2021

 

                                        Appointment: Kimberly Souza 

WPtel:[phone]

                                        [address] [facility]

                                              (30 min) Complex 2020

 

                          Visit Plan:               Chronic Congestive heart sheryl

lure - symptoms stable on lasix - need 

to initiate referral to Dr. Mendoza - if not already done - pt to continue with 
chronic oxygen therapy - due to her weakness and need for easier to transport of
the oxygen from place to place, I have recommended that she needs a portable 
oxygen concentrator. She is too weak to transport large oxygen bottles from 
place to place and is too weak to move them up the stairs in her home. She has 
tripped over her oxygen tubing from the large oxygen concentrator in her house, 
therefore the extra long tubing is not safe to have in her home. She also has 
trouble getting the oxygen tanks moved from house to vehicles and around places 
if she goes shopping. Hypertension - uncontrolled - the patient's medications 
have been modified as documented in the visit note. The patient has been 
counseled to cut back on salt in diet for a no added salt diet, low fat diet, 
start an exercise program with low weight bearing exercises and higher aerobic 
activity for heart health. The patient is to check blood pressure readings as an
outpatient and either fax, call, or email the readings to the office next week 
for practitioner to review. The pt is to call for acute concerns. Increase 
losartan from 50mg daily to 50mg twice daily. Hypothyroidism - pt with chronic 
hypothyroidism, continue with current medication, will monitor pt for signs or 
symptoms of lack of adequate supplementation. Pt is to continue with current 
dose of medication unless directed otherwise. Check labs at regular intervals q 
3 months or q 6 months based on previous levels of control. Fasting labs to be 
done to be drawn by home health.

                                                            10/15/2020

 

                                        Appointment: Kimberly Souza 

WPtel:[phone]

                                        [address] [facility]

[place]                                              (15 min) Moderate 10/15/2020

 

             Patient Education: Patient Medication Summary                      

     Completed    10/15/2020

 

                          Visit Plan:               CHF - congestive heart failu

re - Pt has Chronic congestive heart 

failure - and is currently fairly well maintained on the current medications. 
Today there is no change in the treatment course. If symptoms worsen, increase 
edema or more that 2-3 pound weight gain over a week without improvement in the 
symptoms with a decrease in the sodium of the diet, then the pt is to have the 
office alerted. Dementia - Pt with slowly progressive pattern. I have discussed 
with pt and family the prognosis of this disease state and the need for the 
family to anticipate further decline with behavior changes. Continue with 
current plan of treatment.

                                                            2020

 

             Patient Education: Patient Medication Summary                      

     Completed    2020







Instructions





                          Comment                   Date

 

                                        . Hypertension - well controlled - pop

nue with current medications, continue 

with no added salt diet.  Pt has been encouraged to exercise daily.

The pt has been advised to call the office if there are any acute concerns about
change in blood pressure readings at home.



Hypothyroidism - pt with chronic hypothyroidism, continue with current 
medication, will monitor pt for signs or symptoms of lack of adequate 
supplementation.  Pt is to continue with current dose of medication unless 
directed otherwise.  Check labs at regular intervals q 3 months or q 6 months 
based on previous levels of control.



B12 def -injection today 

                                        2021

 

                                        HAVE HOME HEALTH DRAW LABS WHEN THEY DO 

NEXT B12 INJECTION - CBC, CMP, TSH, FREE

T4, BNP, MAGNESIUM . Hypertension - well controlled - continue with current 
medications, continue with no added salt diet.  Pt has been encouraged to 
exercise daily.

The pt has been advised to call the office if there are any acute concerns about
change in blood pressure readings at home.



Anemia- recheck labs -continue b12 injections 



Edema - pt has been advised to elevate legs to prevent dependent edema, 
compression has been recommended to help to naturally decrease peripheral edema.
 Diuretic use has been discussed and pt has been instructed in appropriate use 
of such medication as necessary to further attempt to reduce peripheral edema.



Hypothyroidism -check levels with next labs  06/15/2021

 

                                        CONTINUE LOW SODIUM DIET



CONTINUE HOME HEALTH WITH [facility] B12 INJECTIONS



FOLLOW UP WITH DR OVALLE FOR PFTS AND CT SCAN

                                        . Hypertension - well controlled - pop

nue with current medications, continue 

with no added salt diet.  Pt has been encouraged to exercise daily.

The pt has been advised to call the office if there are any acute concerns about
change in blood pressure readings at home.



CHF - congestive heart failure - Pt has Chronic congestive heart failure - and 
is currently fairly well maintained on the current medications.  Today there is 
no change in the treatment course.  If symptoms worsen, increase edema or more 
that 2-3 pound weight gain over a week without improvement in the symptoms with 
a decrease in the sodium of the diet, then the pt is to have the office alerted.



Anemia- continue with B12 injections per  

                                        2021

 

                                        . Acute on chronic CHF - patient refuses

 cardiology -continue with oxygen at all

times- patient did not wear oxygen to appt- patient and sister verbalized 
understanding of plan. Did recommend follow up with cardiology due to recurrent 
hospitalizations for CHF and patient continues to refuse 



Anemia- repeat labs today 



HTN -controlled- no change in medications today  2021

 

                                        . Chronic Congestive heart failure - sym

ptoms stable on lasix  -pt has refused 

referral to Cardiology - she is not interested in any other physician and will 
not go to another specialist per her report - pt to continue with chronic oxygen
therapy.



Hypertension - well controlled - continue with current medications, continue 
with no added salt diet.  Pt has been encouraged to exercise daily.

The pt has been advised to call the office if there are any acute concerns about
change in blood pressure readings at home.



Hypothyroidism - pt with chronic hypothyroidism, continue with current 
medication, will monitor pt for signs or symptoms of lack of adequate 
supplementation.  Pt is to continue with current dose of medication unless 
directed otherwise.  Check labs at regular intervals q 3 months or q 6 months 
based on previous levels of control.

                                        2021

 

                                        . Chronic Congestive heart failure - sym

ptoms stable on lasix  - need to 

initiate referral to Dr. Mendoza - if not already done - pt to continue with 
chronic oxygen therapy - due to her weakness and need for easier to transport of
the oxygen from place to place, I have recommended that she needs a portable 
oxygen concentrator.  She is too weak to transport large oxygen bottles from 
place to place and is too weak to move them up the stairs in her home.  She has 
tripped over her oxygen tubing from the large oxygen concentrator in her house, 
therefore the extra long tubing is not safe to have in her home.  She also has 
trouble getting the oxygen tanks moved from house to vehicles and around places 
if she goes shopping.



Hypertension - uncontrolled - the patient's medications have been modified as 
documented in the visit note.  The patient has been counseled to cut back on 
salt in diet for a no added salt diet, low fat diet, start an exercise program 
with low weight bearing exercises and higher aerobic activity for heart health. 


The patient is to check blood pressure readings as an outpatient and either fax,
call, or email the readings to the office next week for practitioner to review.

The pt is to call for acute concerns.

Increase losartan from 50mg daily to 50mg twice daily.



Hypothyroidism - pt with chronic hypothyroidism, continue with current 
medication, will monitor pt for signs or symptoms of lack of adequate 
supplementation.  Pt is to continue with current dose of medication unless 
directed otherwise.  Check labs at regular intervals q 3 months or q 6 months 
based on previous levels of control.



Fasting labs to be done to be drawn by [facility]. 10/15/2020

 

                                        Will set up with [facility]



pt is to consider moving to assisted living



pt is to get a life alert bracelet. CHF - congestive heart failure - Pt has 
Chronic congestive heart failure - and is currently fairly well maintained on 
the current medications.  Today there is no change in the treatment course.  If 
symptoms worsen, increase edema or more that 2-3 pound weight gain over a week 
without improvement in the symptoms with a decrease in the sodium of the diet, 
then the pt is to have the office alerted.



Dementia - Pt with slowly progressive pattern.  I have discussed with pt and 
family the prognosis of this disease state and the need for the family to 
anticipate further decline with behavior changes.  Continue with current plan of
treatment.

                                        2020







Medical Equipment

No Medical Equipment data



Health Concerns Section

Health Concerns data not found



Goals Section

Goals data not found



Interventions Section

Interventions data not found



Health Status Evaluations/Outcomes Section

Health Status Evaluations/Outcomes data not found



Advance Directives

No Advance Directive data

## 2022-01-03 NOTE — XMS REPORT
CCD document using C-CDA

                             Created on: 12/10/2021



[employer]

External Reference #: [id number]

: 1934

Sex: Female



Demographics





                          Address                   [address]  [place]  [address]

 

                          Home Phone                [phone]

 

                          Preferred Language        Unknown

 

                          Marital Status            Unknown

 

                          Zoroastrianism Affiliation     Unknown

 

                          Race                      White

 

                          Ethnic Group              Not  or 





Author





                          Author                    Tierra Moreno

 

                          Organization              Kimberly Souza MD, [facility]

 

                          Address                   [address] [place]  [address]



 

                          Phone                     [phone]







Care Team Providers





                    Care Team Member Name Role                Phone

 

                    Kimberly Souza     PP                  Unavailable

 

                          CCM                       Unavailable







Summary Purpose

Interface Exchange



Insurance Providers





             Payer name   Policy type / Coverage type Covered party ID Effective

 Begin Date 

Effective End Date

 

             [employer] Medicare Part B Medicare Part B [id number]  Unknown      Unkno

wn

 

             [facility] Medicare Part B [id number] Unkno

wn      Unknown







Family history





Sister



                          Diagnosis                 Age At Onset

 

                          Arthritis                 Unknown

 

                          Hypertension              Unknown

 

                          Anemia                    Unknown

 

                          Diabetes mellitus Type 2  Unknown

 

                          Osteoporosis              Unknown







Son



                          Diagnosis                 Age At Onset

 

                          Myocardial infarction     Unknown

 

                          Hypertension              Unknown

 

                          Diabetes mellitus Type 2  Unknown







Father



                          Diagnosis                 Age At Onset

 

                          Cancer                    Unknown

 

                          Alcoholism                Unknown

 

                          kidney disease            Unknown







Mother



                          Diagnosis                 Age At Onset

 

                          Cancer                    Unknown







Brother



                          Diagnosis                 Age At Onset

 

                          Hypertension              Unknown

 

                          Cancer                    Unknown

 

                          Alcoholism                Unknown

 

                          Diabetes mellitus Type 2  Unknown

 

                          kidney disease            Unknown

 

                          Myocardial infarction     Unknown







Social History





                Social History Element Codes           Description     Effective

 Dates

 

                Marital status  Unknown                  2020

 

                Employment      Unknown         Retired         2020

 

                    Tobacco history     SNOMED CT: 5113525  Quit over 10 years a

go

                          2020

 

                Alcohol history SNOMED CT: 298675374 Never drinks alcohol 2020







Allergies, Adverse Reactions, Alerts





           Substance  Reaction   Codes      Entered Date Inactivated Date Status

 

           Penicillin rash,      Unknown    2020 No Inactive Date Active

 

             * OTHER REACTION - SEE ANSWER BOX Tetracycline- Rash Unknown      0

2020   No 

Inactive Date                           Active







Problems





                Condition       Codes           Effective Dates Condition Status

 

                spinal stenosis Unknown         10/25/2021      Active

 

                          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

ication ICD-10: M48.062

ICD-9: 724.03             10/25/2021                Active

 

                          CHF (congestive heart failure) ICD-10: I50.9

ICD-9: 428.0              2020                Active

 

                          On supplemental oxygen therapy ICD-10: Z99.81

ICD-9: V46.2              10/15/2020                Active

 

                          Panlobular emphysema      ICD-10: J43.1

ICD-9: 492.8              2021                Active

 

                          Atrophy of thyroid (acquired) ICD-10: E03.4

ICD-9: 244.8              10/15/2020                Active

 

                          Essential (primary) hypertension ICD-10: I10

ICD-9: 401.1              10/15/2020                Active

 

                          Vitamin B12 deficiency (dietary) anemia ICD-10: D51.8

ICD-9: 281.1              2021                Active

 

                          Localized edema           ICD-10: R60.0

ICD-9: 782.3              06/15/2021                Active

 

                          Acute on chronic diastolic congestive heart failure IC

D-10: I50.33

ICD-9: 428.33             2021                Active

 

                          Anemia                    ICD-10: D64.9

ICD-9: 285.9              2021                Active

 

                          Dementia without behavioral disturbance, unspecified d

ementia type ICD-10: 

F03.90

ICD-9: 294.20             2020                Active







Medications





          Medication Codes     Instructions Start Date Stop Date Status    Fill 

Instructions

 

                losartan 50 mg tablet RxNorm: 024311  Take 1 Tablet(s) Oral two 

times a day 

12/10/2021          2022          Active               

 

                    cyanocobalamin (vit B-12) 1,000 mcg/mL injection solution Rx

Norm: 706144      1 

Milliliter(s) Injection monthly 2021      Active          

will need 

syringe/needle for monthly injection dx b12 deficiency

 

                levothyroxine 112 mcg tablet RxNorm: 666830  Take 1 Tablet(s) Or

al every day 

2021          Active               

 

                    hydrocodone 5 mg-acetaminophen 325 mg tablet RxNorm: 843241 

     Take 1 Tablet(s) 

Oral three times a day as needed for pain 2021      Active

           

 

                    cyanocobalamin (vit B-12) 1,000 mcg/mL injection solution Rx

Norm: 872299      1 

Milliliter(s) Injection monthly 2021      Inactive        

will need 

syringe/needle for monthly injection dx b12 deficiency

 

                    cyanocobalamin (vit B-12) 1,000 mcg/mL injection solution Rx

Norm: 685274      1 

Milliliter(s) Injection monthly 2021      Inactive        

will need 

syringe/needle for monthly  injection dx b12 deficiency

 

                levothyroxine 112 mcg tablet RxNorm: 050371  1 Tablet(s) Oral ev

berta day 

2021          Inactive             

 

                    hydrocodone 5 mg-acetaminophen 325 mg tablet RxNorm: 808954 

     Take 1 Tablet(s) 

Oral three times a day as needed for pain 10/25/2021      10/25/2021      Inacti

ve         

 

                          albuterol sulfate 2.5 mg/3 mL (0.083 %) solution for n

ebulization RxNorm: 630925

             USE 1 VIAL IN NEBULIZER TWICE DAILY 10/19/2021   2021   Inact

maryjo      

 

                          albuterol sulfate 2.5 mg/3 mL (0.083 %) solution for n

ebulization RxNorm: 829013

             USE 1 VIAL IN NEBULIZER TWICE DAILY 10/19/2021   10/19/2021   Inact

maryjo      

 

                levothyroxine 112 mcg tablet RxNorm: 785287  1 Tablet(s) Oral ev

berta day 

10/07/2021          10/07/2021          Inactive             

 

                levothyroxine 112 mcg tablet RxNorm: 817053  1 Tablet(s) Oral ev

berta day 

10/07/2021          10/07/2021          Inactive             

 

                levothyroxine 112 mcg tablet RxNorm: 903343  1 Tablet(s) Oral ev

berta day 

2021          10/07/2021          Inactive             

 

                    cyanocobalamin (vit B-12) 1,000 mcg/mL injection solution Rx

Norm: 353703      Take 

Milliliter(s) Injection 2021      Inactive         

 

                    cyanocobalamin (vit B-12) 1,000 mcg/mL injection solution Rx

Norm: 296404      Take 

Milliliter(s) Injection 2021      Inactive         

 

                          albuterol sulfate 2.5 mg/3 mL (0.083 %) solution for n

ebulization RxNorm: 407462

             1 Unit Dose Inhalation two times a day DX J43.1 2021   Inactive      

 

                losartan 50 mg tablet RxNorm: 257680  1 Tablet(s) Oral two times

 a day 2021          Inactive             

 

                          albuterol sulfate 2.5 mg/3 mL (0.083 %) solution for n

ebulization RxNorm: 249264

             1 Unit Dose Inhalation two times a day DX J43.1 2021   Inactive      

 

                          albuterol sulfate 2.5 mg/3 mL (0.083 %) solution for n

ebulization RxNorm: 221599

             1 Unit Dose Inhalation two times a day 2021   In

active      

 

                          albuterol sulfate 2.5 mg/3 mL (0.083 %) solution for n

ebulization RxNorm: 430685

             1 Unit Dose Inhalation two times a day 2021   In

active      

 

             Norvasc 5 mg tablet RxNorm: 062683 1 Tablet(s) Oral every day 2022                Active                     

 

             Norvasc 5 mg tablet RxNorm: 064089 1 Tablet(s) Oral every day 2021                Inactive                   

 

                    metoprolol succinate ER 25 mg tablet,extended release 24 hr 

RxNorm: 702646      1 

Tablet(s) Oral every day 2021      No Stop Date    Active           

 

                    Ventolin HFA 90 mcg/actuation aerosol inhaler RxNorm: 154018

      1-2 Puff(s) 

Inhalation Every 4 hrs as needed 2021      No Stop Date    Active         

  

 

             furosemide 20 mg tablet RxNorm: 334853 1 Tablet(s) Oral every day 0

2021   No 

Stop Date                 Active                     

 

                    furosemide 40 mg tablet RxNorm: 852765      1 Tablet(s) Oral

 as directed 40mg BID x 5

days then 40mg in am and 20mg afternoon thereafter 2020          

Inactive                                give qty sufficient

 

                    potassium chloride ER 10 mEq tablet,extended release RxNorm:

 221077      1 Tablet(s) 

Oral as directed 1 tab bid x 5 days then resume daily 2020          

Inactive                                qty sufficient

 

                    potassium bicarbonate-citric acid 10 mEq effervescent tablet

 RxNorm: 7050517     1 

Tablet(s) Oral every day 10/15/2020      2020      Inactive         

 

                levothyroxine 125 mcg tablet RxNorm: 292414  1 Tablet(s) Oral ev

berta day 

10/15/2020          2021          Inactive             

 

                losartan 50 mg tablet RxNorm: 195589  1 Tablet(s) Oral two times

 a day 10/15/2020

                    2021          Inactive             

 

             furosemide 40 mg tablet RxNorm: 133528 1 Tablet(s) Oral every day 1

0/15/2020   

2020                Inactive                   

 

             Zyrtec 10 mg tablet RxNorm: 7405735 1 Tablet(s) Oral every day 10/0

2020   

2021                Inactive                   

 

                levothyroxine 125 mcg tablet RxNorm: 866203  1 Tablet(s) Oral ev

berta day 

10/05/2020          10/14/2020          Inactive             

 

             Zyrtec 10 mg tablet RxNorm: 9240523 1 Tablet(s) Oral every day 10/0

2020   

10/04/2020                Inactive                   

 

                aspirin 81 mg tablet,delayed release RxNorm: 093695  1 Tablet(s)

 Oral every day 

2020          No Stop Date        Active               

 

             Vitamin C 250 mg tablet RxNorm: 330865 1 Tablet(s) Oral every day 0

2020   No 

Stop Date                 Active                     

 

                Vitamin D3 50 mcg (2,000 unit) tablet RxNorm: 247319  1 Tablet(s

) Oral every day 

2020          No Stop Date        Active               

 

                levothyroxine 88 mcg tablet RxNorm: 931500  1 Tablet(s) Oral ledy

ry day 2020

                    10/04/2020          Inactive             

 

             losartan 50 mg tablet RxNorm: 695064 1 Tablet(s) Oral every day 09/

   

10/14/2020                Inactive                   

 

             furosemide 40 mg tablet RxNorm: 072462 1 Tablet(s) Oral every day 0

2020   

10/14/2020                Inactive                   

 

                    potassium bicarbonate-citric acid 10 mEq effervescent tablet

 RxNorm: 5612146     1 

Tablet(s) Oral every day 2020      10/14/2020      Inactive         

 

          Women's Multivitamin oral RxNorm:   oral      2020           Act

maryjo     

 

          Calcium 600 oral RxNorm: 1897 oral      2020           Active   

  

 

          albuterol sulfate inhalation RxNorm: 792498 inhalation 2020     

      Active     







Medication Administered





             Medication   Codes        Instructions Start Date   Status

 

                    cyanocobalamin (vit B-12) 1,000 mcg/mL injection solution Rx

Norm: 875672      

Milliliter                2021                No longer Active

 

                    cyanocobalamin (vit B-12) 1,000 mcg/mL injection solution Rx

Norm: 347384      

Milliliter                2021                No longer Active







Immunizations





             Vaccine      Codes        Dose         Date         Status

 

             Covid-19     CVX: 207                  04/10/2021    

 

             Covid-19     CVX: 2021    

 

             Pneumococcal (Adult) Unknown                   2019    

 

             Zoster       CVX: 121                  2017    







Results





          Observation Observation Code Item      Item Code Result    Date      S

ervice Location

 

          Cbc With Differential Ord2      WBC                 8.64 K/ul 20

21 Unknown

 

          Cbc With Differential Ord2      RBC                 3.76 M/ul 20

21 Unknown

 

          Cbc With Differential Ord2      HGB                 9.9 g/dl  20

21 Unknown

 

          Cbc With Differential Ord2      Neut%               61.9 %    20

21 Unknown

 

          Cbc With Differential Ord2      HCT                 33.4 %    20

21 Unknown

 

          Cbc With Differential Ord2      MCV                 88.8 fl   20

21 Unknown

 

          Cbc With Differential Ord2      Lymph%              28.1 %    20

21 Unknown

 

          Cbc With Differential Ord2      Mono%               8.0 %     20

21 Unknown

 

          Cbc With Differential Ord2      MCH                 26.3 pg   20

21 Unknown

 

          Cbc With Differential Ord2      MCHC                29.6 pg   20

21 Unknown

 

          Cbc With Differential Ord2      Eos%                1.9 %     20

21 Unknown

 

          Cbc With Differential Ord2      PLT                 332 K/ul  20

21 Unknown

 

          Cbc With Differential Ord2      Baso%               0.1 %     20

21 Unknown

 

          Cbc With Differential Ord2      RDW                 15.2 %    20

21 Unknown

 

          Cbc With Differential Ord2      Neut ABS#           5.35 K/ul 20

21 Unknown

 

          Cbc With Differential Ord2      Lymph ABS#           2.43 K/ul 

021 Unknown

 

          Cbc With Differential Ord2      Mono ABS#           0.7 K/ul  20

21 Unknown

 

          Cbc With Differential Ord2      Eos ABS#            0.2 K/ul  20

21 Unknown

 

          Cbc With Differential Ord2      Baso ABS#           0.0 K/ul  20

21 Unknown

 

          Tsh       Ord6      TSH (3rd IS)           0.21 uIU/mL 2021 Unkn

own

 

          Comp Metabolic Sip897    NA                  141 mEq/L 2021 Unkn

own

 

          Comp Metabolic Coo284    K                   4.1 mEq/L 2021 Unkn

own

 

          Comp Metabolic Fcu302    CL                  104 mEq/L 2021 Unkn

own

 

          Comp Metabolic Xpn340    CO2                 28.0 mEq/L 2021 Unk

nown

 

          Comp Metabolic Uep920    ANION GAP           13        2021 Unkn

own

 

          Comp Metabolic Ceu280    GLUCOSE             78 mg/dL  2021 Unkn

own

 

          Comp Metabolic Vlp768    Creat               0.8 mg/dL 2021 Unkn

own

 

          Comp Metabolic Acn503    eGFR                71 ml/min/1.73m2 20

21 Unknown

 

          Comp Metabolic Sqa555    BUN                 25 mg/dL  2021 Unkn

own

 

          Comp Metabolic Moq612    B/C Ratio           30.9 Ratio 2021 Unk

nown

 

          Comp Metabolic Dtw402    CALCIUM             8.7 mg/dL 2021 Unkn

own

 

          Comp Metabolic Uht045    ALK PHOS            61 U/L    2021 Unkn

own

 

          Comp Metabolic Vcj936    AST(SGOT)           17 U/L    2021 Unkn

own

 

          Comp Metabolic Vza995    ALT(SGPT)           10 U/L    2021 Unkn

own

 

          Comp Metabolic Rnx902    BILI T              0.6 mg/dL 2021 Unkn

own

 

          Comp Metabolic Fgl515    ALBUMIN             3.7 g/dL  2021 Unkn

own

 

          Comp Metabolic Rgm437    TPRO                6.1 g/dL  2021 Unkn

own

 

          Comp Metabolic Osr403    GLOB                2.4 g/dL  2021 Unkn

own

 

          Comp Metabolic Hfz319    A/G Ratio           1.6 Ratio 2021 Unkn

own

 

          Comp Metabolic Prg545    Osmo                285 mOsmo 2021 Unkn

own

 

          B12       Aat100    B12                 >1500.00 pg/ml 2021 Unkn

own

 

          Free T4   Pfj042    FREE T4             1.23 ng/dL 2021 Unknown

 

          Tibc      Ord40     Iron                32 ug/dl  2021 Unknown

 

          Tibc      Ord40     UIBC                402 ug/dL 2021 Unknown

 

          Tibc      Ord40     TIBC                434 ug/dL 2021 Unknown

 

          Tibc      Ord40     Fe-%Sat             7.4 %     2021 Unknown

 

          Ferritin  Ord22     FERRITIN            27.6 ng/mL 2021 Unknown

 

          B12       Eko964    B12                 187.00 pg/ml 2021 Unknow

n

 

          Comp Metabolic Sdj581    NA                  139 mEq/L 2021 Unkn

own

 

          Comp Metabolic Vvd977    K                   4.1 mEq/L 2021 Unkn

own

 

          Comp Metabolic Pel147    CL                  103 mEq/L 2021 Unkn

own

 

          Comp Metabolic Soy947    CO2                 27.0 mEq/L 2021 Unk

nown

 

          Comp Metabolic Sur488    ANION GAP           13        2021 Unkn

own

 

          Comp Metabolic Nzg551    GLUCOSE             79 mg/dL  2021 Unkn

own

 

          Comp Metabolic Ayl009    Creat               0.7 mg/dL 2021 Unkn

own

 

          Comp Metabolic Owi265    eGFR                80 ml/min/1.73m2 20

21 Unknown

 

          Comp Metabolic Ggi835    BUN                 21 mg/dL  2021 Unkn

own

 

          Comp Metabolic Iax191    B/C Ratio           28.8 Ratio 2021 Unk

nown

 

          Comp Metabolic Ejn580    CALCIUM             8.7 mg/dL 2021 Unkn

own

 

          Comp Metabolic Csp701    ALK PHOS            74 U/L    2021 Unkn

own

 

          Comp Metabolic Yma392    AST(SGOT)           15 U/L    2021 Unkn

own

 

          Comp Metabolic Cxd397    ALT(SGPT)           17 U/L    2021 Unkn

own

 

          Comp Metabolic Bxb334    BILI T              0.4 mg/dL 2021 Unkn

own

 

          Comp Metabolic Vaa418    ALBUMIN             3.5 g/dL  2021 Unkn

own

 

          Comp Metabolic Edp570    TPRO                5.9 g/dL  2021 Unkn

own

 

          Comp Metabolic Dpf516    GLOB                2.4 g/dL  2021 Unkn

own

 

          Comp Metabolic Bys338    A/G Ratio           1.4 Ratio 2021 Unkn

own

 

          Comp Metabolic Nzn575    Osmo                279 mOsmo 2021 Unkn

own

 

          Cbc With Differential Ord2      WBC                 7.63 K/ul 20

21 Unknown

 

          Cbc With Differential Ord2      RBC                 3.38 M/ul 20

21 Unknown

 

          Cbc With Differential Ord2      HGB                 9.4 g/dl  20

21 Unknown

 

          Cbc With Differential Ord2      Neut%               52.6 %    20

21 Unknown

 

          Cbc With Differential Ord2      HCT                 31.5 %    20

21 Unknown

 

          Cbc With Differential Ord2      Lymph%              33.8 %    20

21 Unknown

 

          Cbc With Differential Ord2      MCV                 93.2 fl   20

21 Unknown

 

          Cbc With Differential Ord2      MCH                 27.8 pg   20

21 Unknown

 

          Cbc With Differential Ord2      Mono%               7.9 %     20

21 Unknown

 

          Cbc With Differential Ord2      MCHC                29.8 pg   20

21 Unknown

 

          Cbc With Differential Ord2      Eos%                5.4 %     20

21 Unknown

 

          Cbc With Differential Ord2      PLT                 329 K/ul  20

21 Unknown

 

          Cbc With Differential Ord2      Baso%               0.3 %     20

21 Unknown

 

          Cbc With Differential Ord2      RDW                 15.2 %    20

21 Unknown

 

          Cbc With Differential Ord2      Neut ABS#           4.02 K/ul 20

21 Unknown

 

          Cbc With Differential Ord2      Lymph ABS#           2.58 K/ul 

021 Unknown

 

          Cbc With Differential Ord2      Mono ABS#           0.6 K/ul  20

21 Unknown

 

          Cbc With Differential Ord2      Eos ABS#            0.4 K/ul  20

21 Unknown

 

          Cbc With Differential Ord2      Baso ABS#           0.0 K/ul  20

21 Unknown







Procedures





                    Procedure           Codes               Date

 

                    THER/PROPH/DIAG INJ SC/IM CPT-4: 50206        2021

 

                    VITAMIN B12 INJECTION 1000 mcg CPT-4:         

 

                    THER/PROPH/DIAG INJ SC/IM CPT-4: 39052        2021







Vital Signs





                          Date                      Vital

 

                2021      SpO2: 96%       SpO2: 87%       SpO2: 94%

 

                2021      Blood Pressure 1: 138/82 Code: 8480-6 BMI: 33.5 

Code: 05312-2 Heart 

Rate 1: 74 bpm      Height: 5'3" Code: 8302-2 SpO2: 98%           Temperature: 3

6.2 (C) / 97.1 

(F)                                     Weight: 189 lbs  Code: 46875-9

 

                06/15/2021      Blood Pressure 1: 134/80 Code: 8480-6 Heart Rate

 1: 77 bpm Height: 

5'3" Code: 8302-2         SpO2: 93%                 Temperature: 36.3 (C) / 97.3

 (F)

 

                2021      Blood Pressure 1: 136/88 Code: 8480-6 Heart Rate

 1: 60 bpm Height:  

Code: 8302-2        SpO2: 93%           Temperature: 36.3 (C) / 97.3 (F) Weight:

 174 lbs  Code: 

22144-7

 

                2021      Blood Pressure 1: 164/92 Code: 8480-6 BMI: 31.4 

Code: 39078-5 Heart 

Rate 1: 67 bpm      Height: 5'3" Code: 8302-2 SpO2: 94%           Temperature: 3

6.2 (C) / 97.1 

(F)                                     Weight: 177 lbs  Code: 88052-3

 

                2021      Blood Pressure 1: 134/72 Code: 8480-6 BMI: 30.8 

Code: 09567-3 Heart 

Rate 1: 74 bpm  Height: 5'3" Code: 8302-2 Respiratory Rate: 18 bpm SpO2: 95%    

   

Temperature: 36.3 (C) / 97.4 (F)        Weight: 174 lbs  Code: 20086-1

 

                10/15/2020      Blood Pressure 1: 202/94 Code: 8480-6 BMI: 30.6 

Code: 44438-6 Heart 

Rate 1: 73 bpm  Height: 5'3" Code: 8302-2 Respiratory Rate: 17 bpm SpO2: 91%    

   

Temperature: 36.8 (C) / 98.2 (F)        Weight: 173 lbs  Code: 53711-0

 

                2020      Blood Pressure 1: 144/90 Code: 8480-6 BMI: 29.4 

Code: 09982-4 Heart 

Rate 1: 72 bpm      Height: 5'3" Code: 8302-2 SpO2: 98%           Temperature: 3

6.7 (C) / 98.0 

(F)                                     Weight: 166 lbs  Code: 11814-4







Functional Status

No Functional Status data



Reason For Visit





                    Reason For Visit    Effective Dates     Notes

 

                    hypertension        2021           

 

                    hypertension        06/15/2021           

 

                    hypertension        2021           

 

                    Hospital Follow Up  2021           

 

                    shortness of breath 2021           

 

                    shortness of breath 10/15/2020           

 

                    Hospital Follow Up  2020           







Encounters





             Encounter    Performer    Location     Codes        Date

 

                                        ()  EST. PATIENT, LEVEL I

Diagnosis: CHF (congestive heart failure)[ICD10: I50.9]

Diagnosis: On supplemental oxygen therapy[ICD10: Z99.81]

Diagnosis: Panlobular emphysema[ICD10: J43.1] Kimberly rhodes MD, 

[facility]                       CPT-4: 64159              2021

 

                                        [phone] EST. PATIENT, LEVEL IV

Diagnosis: Vitamin B12 deficiency (dietary) anemia[ICD10: D51.8]

Diagnosis: Essential (primary) hypertension[ICD10: I10]

Diagnosis: Atrophy of thyroid (acquired)[ICD10: E03.4] Franca Souza MD, [facility]          CPT-4: 01898              2021

 

                                        [phone] EST. PATIENT, LEVEL IV

Diagnosis: Essential (primary) hypertension[ICD10: I10]

Diagnosis: Vitamin B12 deficiency (dietary) anemia[ICD10: D51.8]

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

Diagnosis: Localized edema[ICD10: R60.0] Franca mejia MD, [facility]

                          CPT-4: 16673              06/15/2021

 

                                        [phone] EST. PATIENT, LEVEL IV

Diagnosis: CHF (congestive heart failure)[ICD10: I50.9]

Diagnosis: Essential (primary) hypertension[ICD10: I10]

Diagnosis: Vitamin B12 deficiency (dietary) anemia[ICD10: D51.8] Franca Souza MD, [facility] CPT-4: 91937        2021

 

                                        [phone] EST. PATIENT, LEVEL IV

Diagnosis: Acute on chronic diastolic congestive heart failure[ICD10: I50.33]

Diagnosis: Anemia[ICD10: D64.9]

Diagnosis: Essential (primary) hypertension[ICD10: I10] Franca Souza MD, [facility]          CPT-4: 00103              2021

 

                                        [phone] EST. PATIENT, LEVEL IV

Diagnosis: Essential (primary) hypertension[ICD10: I10]

Diagnosis: CHF (congestive heart failure)[ICD10: I50.9]

Diagnosis: Atrophy of thyroid (acquired)[ICD10: E03.4] Kimberly Souza MD, [facility]          CPT-4: 02162              2021

 

                                        [phone] EST. PATIENT, LEVEL IV

Diagnosis: Essential (primary) hypertension[ICD10: I10]

Diagnosis: CHF (congestive heart failure)[ICD10: I50.9]

Diagnosis: On supplemental oxygen therapy[ICD10: Z99.81]

Diagnosis: Atrophy of thyroid (acquired)[ICD10: E03.4] [facility]          CPT-4: 83054              10/15/2020

 

                                        OFFICE  VISIT, NEW - LEVEL 3

Diagnosis: CHF (congestive heart failure)[ICD10: I50.9]

Diagnosis: Dementia without behavioral disturbance, unspecified dementia 
type[ICD10: F03.90] [facility] CPT-4: 50172    







Plan of Care





             Planned Activity Notes        Codes        Status       Date

 

             Patient Education: Patient Medication Summary                      

     Completed    10/25/2021

 

             Appointment:                            Nurse Visit  2021

 

             Patient Education: Patient Medication Summary                      

     Completed    2021

 

                          Visit Plan:               Hypertension - well controll

ed - continue with current medications,

continue with no added salt diet. Pt has been encouraged to exercise daily. The 
pt has been advised to call the office if there are any acute concerns about 
change in blood pressure readings at home. Hypothyroidism - pt with chronic 
hypothyroidism, continue with current medication, will monitor pt for signs or 
symptoms of lack of adequate supplementation. Pt is to continue with current 
dose of medication unless directed otherwise. Check labs at regular intervals q 
3 months or q 6 months based on previous levels of control. B12 def -injection 
today

                                                            2021

 

                                        Appointment: Franca Urbina 

WPtel:[phone]

                                        [address] [place], [place] [address]

                                              (30 min) Complex 2021

 

             Patient Education: Patient Medication Summary                      

     Completed    2021

 

             Appointment:                            Injection    2021

 

             Patient Education: Patient Medication Summary                      

     Completed    2021

 

             Patient Education: Patient Medication Summary                      

     Completed    2021

 

             Care Plan: Cbc With Differential                           Pending 

     2021

 

             Care Plan: Comp Metabolic                           Pending      

 

             Care Plan: Tsh                           Pending      2021

 

             Care Plan: Magnesium                           Pending      

021

 

             Care Plan: Free T4                           Pending      

 

                          Visit Plan:               Hypertension - well controll

ed - continue with current medications,

continue with no added salt diet. Pt has been encouraged to exercise daily. The 
pt has been advised to call the office if there are any acute concerns about 
change in blood pressure readings at home. Anemia- recheck labs -continue b12 
injections Edema - pt has been advised to elevate legs to prevent dependent 
edema, compression has been recommended to help to naturally decrease peripheral
edema. Diuretic use has been discussed and pt has been instructed in appropriate
use of such medication as necessary to further attempt to reduce peripheral 
edema. Hypothyroidism -check levels with next labs

                                                            06/15/2021

 

                                        Appointment: Franca Urbina 

WPtel:[phone]

                                        [address] [facility]

                                              (30 min) Complex 06/15/2021

 

             Patient Education: Patient Medication Summary                      

     Completed    06/15/2021

 

                                        Appointment: Franca Urbina 

WPtel:[phone]

                                        [address] [facility]

[place]                                              (30 min) Complex 2021

 

                                        Appointment: Kimberly Souza 

WPtel:[phone]

                                        [address] [facility]

[place]                                              (15 min) Moderate 04/15/2021

 

                          Visit Plan:               Hypertension - well controll

ed - continue with current medications,

continue with no added salt diet. Pt has been encouraged to exercise daily. The 
pt has been advised to call the office if there are any acute concerns about 
change in blood pressure readings at home. CHF - congestive heart failure - Pt 
has Chronic congestive heart failure - and is currently fairly well maintained 
on the current medications. Today there is no change in the treatment course. If
symptoms worsen, increase edema or more that 2-3 pound weight gain over a week 
without improvement in the symptoms with a decrease in the sodium of the diet, 
then the pt is to have the office alerted. Anemia- continue with B12 injections 
per 

                                                            2021

 

                                        Appointment: Franca Urbina 

WPtel:[phone]

                                        [address] [facility]

[place]                                              (30 min) Complex 2021

 

             Patient Education: Patient Medication Summary                      

     Completed    2021

 

                          Visit Plan:               Acute on chronic CHF - patie

nt refuses cardiology -continue with 

oxygen at all times- patient did not wear oxygen to appt- patient and sister 
verbalized understanding of plan. Did recommend follow up with cardiology due to
recurrent hospitalizations for CHF and patient continues to refuse Anemia- 
repeat labs today HTN -controlled- no change in medications today

                                                            2021

 

                                        Appointment: Franca Urbina 

WPtel:[phone]

                                        [address] [facility]

                                              (30 min) Complex 2021

 

             Patient Education: Patient Medication Summary                      

     Completed    2021

 

             Patient Education: Patient Medication Summary                      

     Completed    2021

 

                          Visit Plan:               Chronic Congestive heart sheryl

lure - symptoms stable on lasix -pt has

refused referral to Cardiology - she is not interested in any other physician 
and will not go to another specialist per her report - pt to continue with 
chronic oxygen therapy. Hypertension - well controlled - continue with current 
medications, continue with no added salt diet. Pt has been encouraged to 
exercise daily. The pt has been advised to call the office if there are any 
acute concerns about change in blood pressure readings at home. Hypothyroidism -
pt with chronic hypothyroidism, continue with current medication, will monitor 
pt for signs or symptoms of lack of adequate supplementation. Pt is to continue 
with current dose of medication unless directed otherwise. Check labs at regular
intervals q 3 months or q 6 months based on previous levels of control.

                                                            2021

 

             Patient Education: Patient Medication Summary                      

     Completed    2021

 

                                        Appointment: Kimberly Souza 

WPtel:[phone]

                                        [address] [facility]

                                              (30 min) Complex 2020

 

                          Visit Plan:               Chronic Congestive heart sheryl

lure - symptoms stable on lasix - need 

to initiate referral to Dr. Mendoza - if not already done - pt to continue with 
chronic oxygen therapy - due to her weakness and need for easier to transport of
the oxygen from place to place, I have recommended that she needs a portable 
oxygen concentrator. She is too weak to transport large oxygen bottles from 
place to place and is too weak to move them up the stairs in her home. She has 
tripped over her oxygen tubing from the large oxygen concentrator in her house, 
therefore the extra long tubing is not safe to have in her home. She also has 
trouble getting the oxygen tanks moved from house to vehicles and around places 
if she goes shopping. Hypertension - uncontrolled - the patient's medications 
have been modified as documented in the visit note. The patient has been 
counseled to cut back on salt in diet for a no added salt diet, low fat diet, 
start an exercise program with low weight bearing exercises and higher aerobic 
activity for heart health. The patient is to check blood pressure readings as an
outpatient and either fax, call, or email the readings to the office next week 
for practitioner to review. The pt is to call for acute concerns. Increase 
losartan from 50mg daily to 50mg twice daily. Hypothyroidism - pt with chronic 
hypothyroidism, continue with current medication, will monitor pt for signs or 
symptoms of lack of adequate supplementation. Pt is to continue with current 
dose of medication unless directed otherwise. Check labs at regular intervals q 
3 months or q 6 months based on previous levels of control. Fasting labs to be 
done to be drawn by home health.

                                                            10/15/2020

 

                                        Appointment: Kimberly Souza 

WPtel:[phone]

                                        [address] [place], [place] [address]

                                              (15 min) Moderate 10/15/2020

 

             Patient Education: Patient Medication Summary                      

     Completed    10/15/2020

 

                          Visit Plan:               CHF - congestive heart failu

re - Pt has Chronic congestive heart 

failure - and is currently fairly well maintained on the current medications. 
Today there is no change in the treatment course. If symptoms worsen, increase 
edema or more that 2-3 pound weight gain over a week without improvement in the 
symptoms with a decrease in the sodium of the diet, then the pt is to have the 
office alerted. Dementia - Pt with slowly progressive pattern. I have discussed 
with pt and family the prognosis of this disease state and the need for the 
family to anticipate further decline with behavior changes. Continue with 
current plan of treatment.

                                                            2020

 

             Patient Education: Patient Medication Summary                      

     Completed    2020







Instructions





                          Comment                   Date

 

                                        . Hypertension - well controlled - pop

nue with current medications, continue 

with no added salt diet.  Pt has been encouraged to exercise daily.

The pt has been advised to call the office if there are any acute concerns about
change in blood pressure readings at home.



Hypothyroidism - pt with chronic hypothyroidism, continue with current 
medication, will monitor pt for signs or symptoms of lack of adequate 
supplementation.  Pt is to continue with current dose of medication unless 
directed otherwise.  Check labs at regular intervals q 3 months or q 6 months 
based on previous levels of control.



B12 def -injection today 

                                        2021

 

                                        HAVE HOME HEALTH DRAW LABS WHEN THEY DO 

NEXT B12 INJECTION - CBC, CMP, TSH, FREE

T4, BNP, MAGNESIUM . Hypertension - well controlled - continue with current 
medications, continue with no added salt diet.  Pt has been encouraged to 
exercise daily.

The pt has been advised to call the office if there are any acute concerns about
change in blood pressure readings at home.



Anemia- recheck labs -continue b12 injections 



Edema - pt has been advised to elevate legs to prevent dependent edema, 
compression has been recommended to help to naturally decrease peripheral edema.
 Diuretic use has been discussed and pt has been instructed in appropriate use 
of such medication as necessary to further attempt to reduce peripheral edema.



Hypothyroidism -check levels with next labs  06/15/2021

 

                                        CONTINUE LOW SODIUM DIET



CONTINUE HOME HEALTH WITH [facility] B12 INJECTIONS



FOLLOW UP WITH DR OVALLE FOR PFTS AND CT SCAN

                                        . Hypertension - well controlled - pop

nue with current medications, continue 

with no added salt diet.  Pt has been encouraged to exercise daily.

The pt has been advised to call the office if there are any acute concerns about
change in blood pressure readings at home.



CHF - congestive heart failure - Pt has Chronic congestive heart failure - and 
is currently fairly well maintained on the current medications.  Today there is 
no change in the treatment course.  If symptoms worsen, increase edema or more 
that 2-3 pound weight gain over a week without improvement in the symptoms with 
a decrease in the sodium of the diet, then the pt is to have the office alerted.



Anemia- continue with B12 injections per  

                                        2021

 

                                        . Acute on chronic CHF - patient refuses

 cardiology -continue with oxygen at all

times- patient did not wear oxygen to appt- patient and sister verbalized 
understanding of plan. Did recommend follow up with cardiology due to recurrent 
hospitalizations for CHF and patient continues to refuse 



Anemia- repeat labs today 



HTN -controlled- no change in medications today  2021

 

                                        . Chronic Congestive heart failure - sym

ptoms stable on lasix  -pt has refused 

referral to Cardiology - she is not interested in any other physician and will 
not go to another specialist per her report - pt to continue with chronic oxygen
therapy.



Hypertension - well controlled - continue with current medications, continue 
with no added salt diet.  Pt has been encouraged to exercise daily.

The pt has been advised to call the office if there are any acute concerns about
change in blood pressure readings at home.



Hypothyroidism - pt with chronic hypothyroidism, continue with current 
medication, will monitor pt for signs or symptoms of lack of adequate 
supplementation.  Pt is to continue with current dose of medication unless 
directed otherwise.  Check labs at regular intervals q 3 months or q 6 months 
based on previous levels of control.

                                        2021

 

                                        . Chronic Congestive heart failure - sym

ptoms stable on lasix  - need to 

initiate referral to Dr. Mendoza - if not already done - pt to continue with 
chronic oxygen therapy - due to her weakness and need for easier to transport of
the oxygen from place to place, I have recommended that she needs a portable 
oxygen concentrator.  She is too weak to transport large oxygen bottles from 
place to place and is too weak to move them up the stairs in her home.  She has 
tripped over her oxygen tubing from the large oxygen concentrator in her house, 
therefore the extra long tubing is not safe to have in her home.  She also has 
trouble getting the oxygen tanks moved from house to vehicles and around places 
if she goes shopping.



Hypertension - uncontrolled - the patient's medications have been modified as 
documented in the visit note.  The patient has been counseled to cut back on 
salt in diet for a no added salt diet, low fat diet, start an exercise program 
with low weight bearing exercises and higher aerobic activity for heart health. 


The patient is to check blood pressure readings as an outpatient and either fax,
call, or email the readings to the office next week for practitioner to review.

The pt is to call for acute concerns.

Increase losartan from 50mg daily to 50mg twice daily.



Hypothyroidism - pt with chronic hypothyroidism, continue with current 
medication, will monitor pt for signs or symptoms of lack of adequate 
supplementation.  Pt is to continue with current dose of medication unless 
directed otherwise.  Check labs at regular intervals q 3 months or q 6 months 
based on previous levels of control.



Fasting labs to be done to be drawn by [facility]. 10/15/2020

 

                                        Will set up with [facility]



pt is to consider moving to assisted living



pt is to get a life alert bracelet. CHF - congestive heart failure - Pt has 
Chronic congestive heart failure - and is currently fairly well maintained on 
the current medications.  Today there is no change in the treatment course.  If 
symptoms worsen, increase edema or more that 2-3 pound weight gain over a week 
without improvement in the symptoms with a decrease in the sodium of the diet, 
then the pt is to have the office alerted.



Dementia - Pt with slowly progressive pattern.  I have discussed with pt and 
family the prognosis of this disease state and the need for the family to 
anticipate further decline with behavior changes.  Continue with current plan of
treatment.

                                        2020







Medical Equipment

No Medical Equipment data



Health Concerns Section

Health Concerns data not found



Goals Section

Goals data not found



Interventions Section

Interventions data not found



Health Status Evaluations/Outcomes Section

Health Status Evaluations/Outcomes data not found



Advance Directives

No Advance Directive data

## 2022-01-03 NOTE — XMS REPORT
CCD document using C-CDA

                             Created on: 2021



Tierra Rizvi

External Reference #: [id number]

: 1934

Sex: Female



Demographics





                          Address                   [address]  [place]  [address]

 

                          Home Phone                [phone]

 

                          Preferred Language        Unknown

 

                          Marital Status            Unknown

 

                          Hindu Affiliation     Unknown

 

                          Race                      White

 

                          Ethnic Group              Not  or 





Author





                          Author                    Tierra Moreno

 

                          Organization              Kimberly Souza MD, [facility]

 

                          Address                   [address] [place]  [address]



 

                          Phone                     [phone]







Care Team Providers





                    Care Team Member Name Role                Phone

 

                    Kimberly Souza     PP                  Unavailable

 

                          CCM                       Unavailable







Summary Purpose

Interface Exchange



Insurance Providers





             Payer name   Policy type / Coverage type Covered party ID Effective

 Begin Date 

Effective End Date

 

             [employer] Medicare Part B Medicare Part B [id number]  Unknown      Unkno

wn

 

             [facility] Medicare Part B [id number] Unkno

wn      Unknown







Family history





Sister



                          Diagnosis                 Age At Onset

 

                          Arthritis                 Unknown

 

                          Hypertension              Unknown

 

                          Anemia                    Unknown

 

                          Diabetes mellitus Type 2  Unknown

 

                          Osteoporosis              Unknown







Son



                          Diagnosis                 Age At Onset

 

                          Myocardial infarction     Unknown

 

                          Hypertension              Unknown

 

                          Diabetes mellitus Type 2  Unknown







Father



                          Diagnosis                 Age At Onset

 

                          Cancer                    Unknown

 

                          Alcoholism                Unknown

 

                          kidney disease            Unknown







Mother



                          Diagnosis                 Age At Onset

 

                          Cancer                    Unknown







Brother



                          Diagnosis                 Age At Onset

 

                          Hypertension              Unknown

 

                          Cancer                    Unknown

 

                          Alcoholism                Unknown

 

                          Diabetes mellitus Type 2  Unknown

 

                          kidney disease            Unknown

 

                          Myocardial infarction     Unknown







Social History





                Social History Element Codes           Description     Effective

 Dates

 

                Marital status  Unknown                  2020

 

                Employment      Unknown         Retired         2020

 

                    Tobacco history     SNOMED CT: 4828658  Quit over 10 years a

go

                          2020

 

                Alcohol history SNOMED CT: 335354697 Never drinks alcohol 2020







Allergies, Adverse Reactions, Alerts





           Substance  Reaction   Codes      Entered Date Inactivated Date Status

 

           Penicillin rash,      Unknown    2020 No Inactive Date Active

 

             * OTHER REACTION - SEE ANSWER BOX Tetracycline- Rash Unknown      0

2020   No 

Inactive Date                           Active







Problems





                Condition       Codes           Effective Dates Condition Status

 

                spinal stenosis Unknown         10/25/2021      Active

 

                          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

ication ICD-10: M48.062

ICD-9: 724.03             10/25/2021                Active

 

                          CHF (congestive heart failure) ICD-10: I50.9

ICD-9: 428.0              2020                Active

 

                          On supplemental oxygen therapy ICD-10: Z99.81

ICD-9: V46.2              10/15/2020                Active

 

                          Panlobular emphysema      ICD-10: J43.1

ICD-9: 492.8              2021                Active

 

                          Atrophy of thyroid (acquired) ICD-10: E03.4

ICD-9: 244.8              10/15/2020                Active

 

                          Essential (primary) hypertension ICD-10: I10

ICD-9: 401.1              10/15/2020                Active

 

                          Vitamin B12 deficiency (dietary) anemia ICD-10: D51.8

ICD-9: 281.1              2021                Active

 

                          Localized edema           ICD-10: R60.0

ICD-9: 782.3              06/15/2021                Active

 

                          Acute on chronic diastolic congestive heart failure IC

D-10: I50.33

ICD-9: 428.33             2021                Active

 

                          Anemia                    ICD-10: D64.9

ICD-9: 285.9              2021                Active

 

                          Dementia without behavioral disturbance, unspecified d

ementia type ICD-10: 

F03.90

ICD-9: 294.20             2020                Active







Medications





          Medication Codes     Instructions Start Date Stop Date Status    Fill 

Instructions

 

                    cyanocobalamin (vit B-12) 1,000 mcg/mL injection solution Rx

Norm: 944601      1 

Milliliter(s) Injection monthly 2021      Active          

will need 

syringe/needle for monthly injection dx b12 deficiency

 

                levothyroxine 112 mcg tablet RxNorm: 263502  Take 1 Tablet(s) Or

al every day 

2021          Active               

 

                    hydrocodone 5 mg-acetaminophen 325 mg tablet RxNorm: 227399 

     Take 1 Tablet(s) 

Oral three times a day as needed for pain 2021      Active

           

 

                    cyanocobalamin (vit B-12) 1,000 mcg/mL injection solution Rx

Norm: 904629      1 

Milliliter(s) Injection monthly 2021      Inactive        

will need 

syringe/needle for monthly injection dx b12 deficiency

 

                    cyanocobalamin (vit B-12) 1,000 mcg/mL injection solution Rx

Norm: 505849      1 

Milliliter(s) Injection monthly 2021      Inactive        

will need 

syringe/needle for monthly  injection dx b12 deficiency

 

                levothyroxine 112 mcg tablet RxNorm: 152896  1 Tablet(s) Oral ev

berta day 

2021          Inactive             

 

                    hydrocodone 5 mg-acetaminophen 325 mg tablet RxNorm: 292294 

     Take 1 Tablet(s) 

Oral three times a day as needed for pain 10/25/2021      10/25/2021      Inacti

ve         

 

                          albuterol sulfate 2.5 mg/3 mL (0.083 %) solution for n

ebulization RxNorm: 017228

             USE 1 VIAL IN NEBULIZER TWICE DAILY 10/19/2021   2021   Activ

e        

 

                          albuterol sulfate 2.5 mg/3 mL (0.083 %) solution for n

ebulization RxNorm: 507838

             USE 1 VIAL IN NEBULIZER TWICE DAILY 10/19/2021   10/19/2021   Inact

maryjo      

 

                levothyroxine 112 mcg tablet RxNorm: 945622  1 Tablet(s) Oral ev

berta day 

10/07/2021          10/07/2021          Inactive             

 

                levothyroxine 112 mcg tablet RxNorm: 039518  1 Tablet(s) Oral ev

berta day 

10/07/2021          10/07/2021          Inactive             

 

                levothyroxine 112 mcg tablet RxNorm: 805594  1 Tablet(s) Oral ev

berta day 

2021          10/07/2021          Inactive             

 

                    cyanocobalamin (vit B-12) 1,000 mcg/mL injection solution Rx

Norm: 515056      Take 

Milliliter(s) Injection 2021      Inactive         

 

                    cyanocobalamin (vit B-12) 1,000 mcg/mL injection solution Rx

Norm: 037547      Take 

Milliliter(s) Injection 2021      Inactive         

 

                          albuterol sulfate 2.5 mg/3 mL (0.083 %) solution for n

ebulization RxNorm: 716242

             1 Unit Dose Inhalation two times a day DX J43.1 2021   Inactive      

 

                losartan 50 mg tablet RxNorm: 445062  1 Tablet(s) Oral two times

 a day 2021          Active               

 

                          albuterol sulfate 2.5 mg/3 mL (0.083 %) solution for n

ebulization RxNorm: 668055

             1 Unit Dose Inhalation two times a day DX J43.1 2021   Inactive      

 

                          albuterol sulfate 2.5 mg/3 mL (0.083 %) solution for n

ebulization RxNorm: 594465

             1 Unit Dose Inhalation two times a day 2021   In

active      

 

                          albuterol sulfate 2.5 mg/3 mL (0.083 %) solution for n

ebulization RxNorm: 874578

             1 Unit Dose Inhalation two times a day 2021   In

active      

 

             Norvasc 5 mg tablet RxNorm: 263171 1 Tablet(s) Oral every day 2022                Active                     

 

             Norvasc 5 mg tablet RxNorm: 941385 1 Tablet(s) Oral every day 2021                Inactive                   

 

                    metoprolol succinate ER 25 mg tablet,extended release 24 hr 

RxNorm: 962392      1 

Tablet(s) Oral every day 2021      No Stop Date    Active           

 

                    Ventolin HFA 90 mcg/actuation aerosol inhaler RxNorm: 391052

      1-2 Puff(s) 

Inhalation Every 4 hrs as needed 2021      No Stop Date    Active         

  

 

             furosemide 20 mg tablet RxNorm: 908396 1 Tablet(s) Oral every day 0

2021   No 

Stop Date                 Active                     

 

                    furosemide 40 mg tablet RxNorm: 890256      1 Tablet(s) Oral

 as directed 40mg BID x 5

days then 40mg in am and 20mg afternoon thereafter 2020          

Inactive                                give qty sufficient

 

                    potassium chloride ER 10 mEq tablet,extended release RxNorm:

 090176      1 Tablet(s) 

Oral as directed 1 tab bid x 5 days then resume daily 2020          

Inactive                                qty sufficient

 

                    potassium bicarbonate-citric acid 10 mEq effervescent tablet

 RxNorm: 9110894     1 

Tablet(s) Oral every day 10/15/2020      2020      Inactive         

 

                levothyroxine 125 mcg tablet RxNorm: 595272  1 Tablet(s) Oral ev

berta day 

10/15/2020          2021          Inactive             

 

                losartan 50 mg tablet RxNorm: 646507  1 Tablet(s) Oral two times

 a day 10/15/2020

                    2021          Inactive             

 

             furosemide 40 mg tablet RxNorm: 535701 1 Tablet(s) Oral every day 1

0/15/2020   

2020                Inactive                   

 

             Zyrtec 10 mg tablet RxNorm: 7833627 1 Tablet(s) Oral every day 10/0

2020   

2021                Inactive                   

 

                levothyroxine 125 mcg tablet RxNorm: 902916  1 Tablet(s) Oral ev

berta day 

10/05/2020          10/14/2020          Inactive             

 

             Zyrtec 10 mg tablet RxNorm: 6808515 1 Tablet(s) Oral every day 10/0

2020   

10/04/2020                Inactive                   

 

                aspirin 81 mg tablet,delayed release RxNorm: 803674  1 Tablet(s)

 Oral every day 

2020          No Stop Date        Active               

 

             Vitamin C 250 mg tablet RxNorm: 697295 1 Tablet(s) Oral every day 0

2020   No 

Stop Date                 Active                     

 

                Vitamin D3 50 mcg (2,000 unit) tablet RxNorm: 287859  1 Tablet(s

) Oral every day 

2020          No Stop Date        Active               

 

                levothyroxine 88 mcg tablet RxNorm: 718157  1 Tablet(s) Oral ledy

ry day 2020

                    10/04/2020          Inactive             

 

             losartan 50 mg tablet RxNorm: 107336 1 Tablet(s) Oral every day 09/

   

10/14/2020                Inactive                   

 

             furosemide 40 mg tablet RxNorm: 829554 1 Tablet(s) Oral every day 0

2020   

10/14/2020                Inactive                   

 

                    potassium bicarbonate-citric acid 10 mEq effervescent tablet

 RxNorm: 0420063     1 

Tablet(s) Oral every day 2020      10/14/2020      Inactive         

 

          Women's Multivitamin oral RxNorm:   oral      2020           Act

maryjo     

 

          Calcium 600 oral RxNorm: 1897 oral      2020           Active   

  

 

          albuterol sulfate inhalation RxNorm: 247377 inhalation 2020     

      Active     







Medication Administered





             Medication   Codes        Instructions Start Date   Status

 

                    cyanocobalamin (vit B-12) 1,000 mcg/mL injection solution Rx

Norm: 357253      

Milliliter                2021                No longer Active

 

                    cyanocobalamin (vit B-12) 1,000 mcg/mL injection solution Rx

Norm: 031484      

Milliliter                2021                No longer Active







Immunizations





             Vaccine      Codes        Dose         Date         Status

 

             Covid-19     CVX: 207                  04/10/2021    

 

             Covid-19     CVX: 2021    

 

             Pneumococcal (Adult) Unknown                   2019    

 

             Zoster       CVX: 121                  2017    







Results





          Observation Observation Code Item      Item Code Result    Date      S

ervice Location

 

          Cbc With Differential Ord2      WBC                 8.64 K/ul 20

21 Unknown

 

          Cbc With Differential Ord2      RBC                 3.76 M/ul 20

21 Unknown

 

          Cbc With Differential Ord2      HGB                 9.9 g/dl  20

21 Unknown

 

          Cbc With Differential Ord2      Neut%               61.9 %    20

21 Unknown

 

          Cbc With Differential Ord2      HCT                 33.4 %    20

21 Unknown

 

          Cbc With Differential Ord2      MCV                 88.8 fl   20

21 Unknown

 

          Cbc With Differential Ord2      Lymph%              28.1 %    20

21 Unknown

 

          Cbc With Differential Ord2      Mono%               8.0 %     20

21 Unknown

 

          Cbc With Differential Ord2      MCH                 26.3 pg   20

21 Unknown

 

          Cbc With Differential Ord2      MCHC                29.6 pg   20

21 Unknown

 

          Cbc With Differential Ord2      Eos%                1.9 %     20

21 Unknown

 

          Cbc With Differential Ord2      Baso%               0.1 %     20

21 Unknown

 

          Cbc With Differential Ord2      PLT                 332 K/ul  20

21 Unknown

 

          Cbc With Differential Ord2      RDW                 15.2 %    20

21 Unknown

 

          Cbc With Differential Ord2      Neut ABS#           5.35 K/ul 20

21 Unknown

 

          Cbc With Differential Ord2      Lymph ABS#           2.43 K/ul 

021 Unknown

 

          Cbc With Differential Ord2      Mono ABS#           0.7 K/ul  20

21 Unknown

 

          Cbc With Differential Ord2      Eos ABS#            0.2 K/ul  20

21 Unknown

 

          Cbc With Differential Ord2      Baso ABS#           0.0 K/ul  20

21 Unknown

 

          Tsh       Ord6      TSH (3rd IS)           0.21 uIU/mL 2021 Unkn

own

 

          Comp Metabolic Fft613    NA                  141 mEq/L 2021 Unkn

own

 

          Comp Metabolic Qob028    K                   4.1 mEq/L 2021 Unkn

own

 

          Comp Metabolic Era386    CL                  104 mEq/L 2021 Unkn

own

 

          Comp Metabolic Bac514    CO2                 28.0 mEq/L 2021 Unk

nown

 

          Comp Metabolic Dwp650    ANION GAP           13        2021 Unkn

own

 

          Comp Metabolic Fdn391    GLUCOSE             78 mg/dL  2021 Unkn

own

 

          Comp Metabolic Oeh480    Creat               0.8 mg/dL 2021 Unkn

own

 

          Comp Metabolic Nvv096    eGFR                71 ml/min/1.73m2 20

21 Unknown

 

          Comp Metabolic Xpr709    BUN                 25 mg/dL  2021 Unkn

own

 

          Comp Metabolic Pjo950    B/C Ratio           30.9 Ratio 2021 Unk

nown

 

          Comp Metabolic Dog179    CALCIUM             8.7 mg/dL 2021 Unkn

own

 

          Comp Metabolic Eyr742    ALK PHOS            61 U/L    2021 Unkn

own

 

          Comp Metabolic Anm867    AST(SGOT)           17 U/L    2021 Unkn

own

 

          Comp Metabolic Fyk279    ALT(SGPT)           10 U/L    2021 Unkn

own

 

          Comp Metabolic Idk399    BILI T              0.6 mg/dL 2021 Unkn

own

 

          Comp Metabolic Mln399    ALBUMIN             3.7 g/dL  2021 Unkn

own

 

          Comp Metabolic Mfn609    TPRO                6.1 g/dL  2021 Unkn

own

 

          Comp Metabolic Rzr908    GLOB                2.4 g/dL  2021 Unkn

own

 

          Comp Metabolic Kcd806    A/G Ratio           1.6 Ratio 2021 Unkn

own

 

          Comp Metabolic Opl956    Osmo                285 mOsmo 2021 Unkn

own

 

          B12       Itw350    B12                 >1500.00 pg/ml 2021 Unkn

own

 

          Free T4   Zsb479    FREE T4             1.23 ng/dL 2021 Unknown

 

          Tibc      Ord40     Iron                32 ug/dl  2021 Unknown

 

          Tibc      Ord40     UIBC                402 ug/dL 2021 Unknown

 

          Tibc      Ord40     TIBC                434 ug/dL 2021 Unknown

 

          Tibc      Ord40     Fe-%Sat             7.4 %     2021 Unknown

 

          Ferritin  Ord22     FERRITIN            27.6 ng/mL 2021 Unknown

 

          B12       Boe063    B12                 187.00 pg/ml 2021 Unknow

n

 

          Comp Metabolic Deg216    NA                  139 mEq/L 2021 Unkn

own

 

          Comp Metabolic Uiy475    K                   4.1 mEq/L 2021 Unkn

own

 

          Comp Metabolic Gvj611    CL                  103 mEq/L 2021 Unkn

own

 

          Comp Metabolic Sjj643    CO2                 27.0 mEq/L 2021 Unk

nown

 

          Comp Metabolic Jcy846    ANION GAP           13        2021 Unkn

own

 

          Comp Metabolic Pqd905    GLUCOSE             79 mg/dL  2021 Unkn

own

 

          Comp Metabolic Nuy533    Creat               0.7 mg/dL 2021 Unkn

own

 

          Comp Metabolic Xgr694    eGFR                80 ml/min/1.73m2 20

21 Unknown

 

          Comp Metabolic Gck482    BUN                 21 mg/dL  2021 Unkn

own

 

          Comp Metabolic Dbf839    B/C Ratio           28.8 Ratio 2021 Unk

nown

 

          Comp Metabolic Ato169    CALCIUM             8.7 mg/dL 2021 Unkn

own

 

          Comp Metabolic Vvd635    ALK PHOS            74 U/L    2021 Unkn

own

 

          Comp Metabolic Rpj611    AST(SGOT)           15 U/L    2021 Unkn

own

 

          Comp Metabolic Avx128    ALT(SGPT)           17 U/L    2021 Unkn

own

 

          Comp Metabolic Ywx239    BILI T              0.4 mg/dL 2021 Unkn

own

 

          Comp Metabolic Tqx266    ALBUMIN             3.5 g/dL  2021 Unkn

own

 

          Comp Metabolic Jjy359    TPRO                5.9 g/dL  2021 Unkn

own

 

          Comp Metabolic Nvn658    GLOB                2.4 g/dL  2021 Unkn

own

 

          Comp Metabolic Otl443    A/G Ratio           1.4 Ratio 2021 Unkn

own

 

          Comp Metabolic Dpf986    Osmo                279 mOsmo 2021 Unkn

own

 

          Cbc With Differential Ord2      WBC                 7.63 K/ul 20

21 Unknown

 

          Cbc With Differential Ord2      RBC                 3.38 M/ul 20

21 Unknown

 

          Cbc With Differential Ord2      HGB                 9.4 g/dl  20

21 Unknown

 

          Cbc With Differential Ord2      Neut%               52.6 %    20

21 Unknown

 

          Cbc With Differential Ord2      HCT                 31.5 %    20

21 Unknown

 

          Cbc With Differential Ord2      MCV                 93.2 fl   20

21 Unknown

 

          Cbc With Differential Ord2      Lymph%              33.8 %    20

21 Unknown

 

          Cbc With Differential Ord2      MCH                 27.8 pg   20

21 Unknown

 

          Cbc With Differential Ord2      Mono%               7.9 %     20

21 Unknown

 

          Cbc With Differential Ord2      MCHC                29.8 pg   20

21 Unknown

 

          Cbc With Differential Ord2      Eos%                5.4 %     20

21 Unknown

 

          Cbc With Differential Ord2      Baso%               0.3 %     20

21 Unknown

 

          Cbc With Differential Ord2      PLT                 329 K/ul  20

21 Unknown

 

          Cbc With Differential Ord2      Neut ABS#           4.02 K/ul 20

21 Unknown

 

          Cbc With Differential Ord2      RDW                 15.2 %    20

21 Unknown

 

          Cbc With Differential Ord2      Lymph ABS#           2.58 K/ul 

021 Unknown

 

          Cbc With Differential Ord2      Mono ABS#           0.6 K/ul  20

21 Unknown

 

          Cbc With Differential Ord2      Eos ABS#            0.4 K/ul  20

21 Unknown

 

          Cbc With Differential Ord2      Baso ABS#           0.0 K/ul  20

21 Unknown







Procedures





                    Procedure           Codes               Date

 

                    THER/PROPH/DIAG INJ SC/IM CPT-4: 48009        2021

 

                    VITAMIN B12 INJECTION 1000 mcg CPT-4:         

 

                    THER/PROPH/DIAG INJ SC/IM CPT-4: 76846        2021







Vital Signs





                          Date                      Vital

 

                2021      SpO2: 96%       SpO2: 94%       SpO2: 87%

 

                2021      Blood Pressure 1: 138/82 Code: 8480-6 BMI: 33.5 

Code: 03798-7 Heart 

Rate 1: 74 bpm      Height: 5'3" Code: 8302-2 SpO2: 98%           Temperature: 3

6.2 (C) / 97.1 

(F)                                     Weight: 189 lbs  Code: 39185-1

 

                06/15/2021      Blood Pressure 1: 134/80 Code: 8480-6 Heart Rate

 1: 77 bpm Height: 

5'3" Code: 8302-2         SpO2: 93%                 Temperature: 36.3 (C) / 97.3

 (F)

 

                2021      Blood Pressure 1: 136/88 Code: 8480-6 Heart Rate

 1: 60 bpm Height:  

Code: 8302-2        SpO2: 93%           Temperature: 36.3 (C) / 97.3 (F) Weight:

 174 lbs  Code: 

45780-7

 

                2021      Blood Pressure 1: 164/92 Code: 8480-6 BMI: 31.4 

Code: 63873-2 Heart 

Rate 1: 67 bpm      Height: 5'3" Code: 8302-2 SpO2: 94%           Temperature: 3

6.2 (C) / 97.1 

(F)                                     Weight: 177 lbs  Code: 65788-4

 

                2021      Blood Pressure 1: 134/72 Code: 8480-6 BMI: 30.8 

Code: 40951-0 Heart 

Rate 1: 74 bpm  Height: 5'3" Code: 8302-2 Respiratory Rate: 18 bpm SpO2: 95%    

   

Temperature: 36.3 (C) / 97.4 (F)        Weight: 174 lbs  Code: 95526-8

 

                10/15/2020      Blood Pressure 1: 202/94 Code: 8480-6 BMI: 30.6 

Code: 03906-8 Heart 

Rate 1: 73 bpm  Height: 5'3" Code: 8302-2 Respiratory Rate: 17 bpm SpO2: 91%    

   

Temperature: 36.8 (C) / 98.2 (F)        Weight: 173 lbs  Code: 03625-7

 

                2020      Blood Pressure 1: 144/90 Code: 8480-6 BMI: 29.4 

Code: 31916-1 Heart 

Rate 1: 72 bpm      Height: 5'3" Code: 8302-2 SpO2: 98%           Temperature: 3

6.7 (C) / 98.0 

(F)                                     Weight: 166 lbs  Code: 94056-1







Functional Status

No Functional Status data



Reason For Visit





                    Reason For Visit    Effective Dates     Notes

 

                    hypertension        2021           

 

                    hypertension        06/15/2021           

 

                    hypertension        2021           

 

                    Hospital Follow Up  2021           

 

                    shortness of breath 2021           

 

                    shortness of breath 10/15/2020           

 

                    Hospital Follow Up  2020           







Encounters





             Encounter    Performer    Location     Codes        Date

 

                                        [phone] EST. PATIENT, LEVEL I

Diagnosis: CHF (congestive heart failure)[ICD10: I50.9]

Diagnosis: On supplemental oxygen therapy[ICD10: Z99.81]

Diagnosis: Panlobular emphysema[ICD10: J43.1] Kimberly rhodes MD[facility] 

[facility]                       CPT-4: 47060              2021

 

                                        [phone]) [phone] EST. PATIENT, LEVEL IV

Diagnosis: Vitamin B12 deficiency (dietary) anemia[ICD10: D51.8]

Diagnosis: Essential (primary) hypertension[ICD10: I10]

Diagnosis: Atrophy of thyroid (acquired)[ICD10: E03.4] Franca Souza MD, [facility]          CPT-4: 52310              2021

 

                                        [phone]) [phone] EST. PATIENT, LEVEL IV

Diagnosis: Essential (primary) hypertension[ICD10: I10]

Diagnosis: Vitamin B12 deficiency (dietary) anemia[ICD10: D51.8]

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

Diagnosis: Localized edema[ICD10: R60.0] Franca mejia MD, [facility]

                          CPT-4: 65545              06/15/2021

 

                                        [phone] EST. PATIENT, LEVEL IV

Diagnosis: CHF (congestive heart failure)[ICD10: I50.9]

Diagnosis: Essential (primary) hypertension[ICD10: I10]

Diagnosis: Vitamin B12 deficiency (dietary) anemia[ICD10: D51.8] Franca Souza MD, [facility] CPT-4: 54577        2021

 

                                        [phone]) [phone] EST. PATIENT, LEVEL IV

Diagnosis: Acute on chronic diastolic congestive heart failure[ICD10: I50.33]

Diagnosis: Anemia[ICD10: D64.9]

Diagnosis: Essential (primary) hypertension[ICD10: I10] Franca Souza MD, [facility]          CPT-4: 12108              2021

 

                                        [phone]) [phone] EST. PATIENT, LEVEL IV

Diagnosis: Essential (primary) hypertension[ICD10: I10]

Diagnosis: CHF (congestive heart failure)[ICD10: I50.9]

Diagnosis: Atrophy of thyroid (acquired)[ICD10: E03.4] Kimberly Souza MD, [facility]          CPT-4: 22643              2021

 

                                        [phone] EST. PATIENT, LEVEL IV

Diagnosis: Essential (primary) hypertension[ICD10: I10]

Diagnosis: CHF (congestive heart failure)[ICD10: I50.9]

Diagnosis: On supplemental oxygen therapy[ICD10: Z99.81]

Diagnosis: Atrophy of thyroid (acquired)[ICD10: E03.4] [facility]          CPT-4: 51358              10/15/2020

 

                                        OFFICE  VISIT, NEW - LEVEL 3

Diagnosis: CHF (congestive heart failure)[ICD10: I50.9]

Diagnosis: Dementia without behavioral disturbance, unspecified dementia 
type[ICD10: F03.90] [facility] CPT-4: 29441    







Plan of Care





             Planned Activity Notes        Codes        Status       Date

 

             Patient Education: Patient Medication Summary                      

     Completed    10/25/2021

 

             Appointment:                            Nurse Visit  2021

 

             Patient Education: Patient Medication Summary                      

     Completed    2021

 

                          Visit Plan:               Hypertension - well controll

ed - continue with current medications,

continue with no added salt diet. Pt has been encouraged to exercise daily. The 
pt has been advised to call the office if there are any acute concerns about 
change in blood pressure readings at home. Hypothyroidism - pt with chronic 
hypothyroidism, continue with current medication, will monitor pt for signs or 
symptoms of lack of adequate supplementation. Pt is to continue with current 
dose of medication unless directed otherwise. Check labs at regular intervals q 
3 months or q 6 months based on previous levels of control. B12 def -injection 
today

                                                            2021

 

                                        Appointment: Franca Urbina 

WPtel:[phone]

                                        [facility] [facility]

                                              (30 min) Complex 2021

 

             Patient Education: Patient Medication Summary                      

     Completed    2021

 

             Appointment:                            Injection    2021

 

             Patient Education: Patient Medication Summary                      

     Completed    2021

 

             Patient Education: Patient Medication Summary                      

     Completed    2021

 

             Care Plan: Cbc With Differential                           Pending 

     2021

 

             Care Plan: Comp Metabolic                           Pending      

 

             Care Plan: Tsh                           Pending      2021

 

             Care Plan: Magnesium                           Pending      

021

 

             Care Plan: Free T4                           Pending      

 

                          Visit Plan:               Hypertension - well controll

ed - continue with current medications,

continue with no added salt diet. Pt has been encouraged to exercise daily. The 
pt has been advised to call the office if there are any acute concerns about 
change in blood pressure readings at home. Anemia- recheck labs -continue b12 
injections Edema - pt has been advised to elevate legs to prevent dependent 
edema, compression has been recommended to help to naturally decrease peripheral
edema. Diuretic use has been discussed and pt has been instructed in appropriate
use of such medication as necessary to further attempt to reduce peripheral 
edema. Hypothyroidism -check levels with next labs

                                                            06/15/2021

 

                                        Appointment: Franca Urbina 

WPtel:[phone]

                                        [address] [facility]

                                              (30 min) Complex 06/15/2021

 

             Patient Education: Patient Medication Summary                      

     Completed    06/15/2021

 

                                        Appointment: Franca Urbina 

WPtel:[phone]

                                        [address] [facility]

[place]                                              (30 min) Complex 2021

 

                                        Appointment: Kimberly Souza 

WPtel:[phone]

                                        [address]                                              (15 min) Moderate 04/15/2021

 

                          Visit Plan:               Hypertension - well controll

ed - continue with current medications,

continue with no added salt diet. Pt has been encouraged to exercise daily. The 
pt has been advised to call the office if there are any acute concerns about 
change in blood pressure readings at home. CHF - congestive heart failure - Pt 
has Chronic congestive heart failure - and is currently fairly well maintained 
on the current medications. Today there is no change in the treatment course. If
symptoms worsen, increase edema or more that 2-3 pound weight gain over a week 
without improvement in the symptoms with a decrease in the sodium of the diet, 
then the pt is to have the office alerted. Anemia- continue with B12 injections 
per HH

                                                            2021

 

                                        Appointment: Franca Urbina 

WPtel:[phone]

                                        [address] [facility]

[place]                                              (30 min) Complex 2021

 

             Patient Education: Patient Medication Summary                      

     Completed    2021

 

                          Visit Plan:               Acute on chronic CHF - patie

nt refuses cardiology -continue with 

oxygen at all times- patient did not wear oxygen to appt- patient and sister 
verbalized understanding of plan. Did recommend follow up with cardiology due to
recurrent hospitalizations for CHF and patient continues to refuse Anemia- 
repeat labs today HTN -controlled- no change in medications today

                                                            2021

 

                                        Appointment: Franca Urbina 

WPtel:[phone]

                                        [address] [facility]

                                              (30 min) Complex 2021

 

             Patient Education: Patient Medication Summary                      

     Completed    2021

 

             Patient Education: Patient Medication Summary                      

     Completed    2021

 

                          Visit Plan:               Chronic Congestive heart sheryl

lure - symptoms stable on lasix -pt has

refused referral to Cardiology - she is not interested in any other physician 
and will not go to another specialist per her report - pt to continue with 
chronic oxygen therapy. Hypertension - well controlled - continue with current 
medications, continue with no added salt diet. Pt has been encouraged to 
exercise daily. The pt has been advised to call the office if there are any 
acute concerns about change in blood pressure readings at home. Hypothyroidism -
pt with chronic hypothyroidism, continue with current medication, will monitor 
pt for signs or symptoms of lack of adequate supplementation. Pt is to continue 
with current dose of medication unless directed otherwise. Check labs at regular
intervals q 3 months or q 6 months based on previous levels of control.

                                                            2021

 

             Patient Education: Patient Medication Summary                      

     Completed    2021

 

                                        Appointment: Kimberly Souza 

WPtel:[phone]

                                        [address] [facility]

[place]                                              (30 min) Complex 2020

 

                          Visit Plan:               Chronic Congestive heart sheryl

lure - symptoms stable on lasix - need 

to initiate referral to Dr. Mendoza - if not already done - pt to continue with 
chronic oxygen therapy - due to her weakness and need for easier to transport of
the oxygen from place to place, I have recommended that she needs a portable 
oxygen concentrator. She is too weak to transport large oxygen bottles from 
place to place and is too weak to move them up the stairs in her home. She has 
tripped over her oxygen tubing from the large oxygen concentrator in her house, 
therefore the extra long tubing is not safe to have in her home. She also has 
trouble getting the oxygen tanks moved from house to vehicles and around places 
if she goes shopping. Hypertension - uncontrolled - the patient's medications 
have been modified as documented in the visit note. The patient has been 
counseled to cut back on salt in diet for a no added salt diet, low fat diet, 
start an exercise program with low weight bearing exercises and higher aerobic 
activity for heart health. The patient is to check blood pressure readings as an
outpatient and either fax, call, or email the readings to the office next week 
for practitioner to review. The pt is to call for acute concerns. Increase 
losartan from 50mg daily to 50mg twice daily. Hypothyroidism - pt with chronic 
hypothyroidism, continue with current medication, will monitor pt for signs or 
symptoms of lack of adequate supplementation. Pt is to continue with current 
dose of medication unless directed otherwise. Check labs at regular intervals q 
3 months or q 6 months based on previous levels of control. Fasting labs to be 
done to be drawn by home health.

                                                            10/15/2020

 

                                        Appointment: Kimberly Souza 

WPtel:[phone]

                                        [address] [facility]

                                              (15 min) Moderate 10/15/2020

 

             Patient Education: Patient Medication Summary                      

     Completed    10/15/2020

 

                          Visit Plan:               CHF - congestive heart failu

re - Pt has Chronic congestive heart 

failure - and is currently fairly well maintained on the current medications. 
Today there is no change in the treatment course. If symptoms worsen, increase 
edema or more that 2-3 pound weight gain over a week without improvement in the 
symptoms with a decrease in the sodium of the diet, then the pt is to have the 
office alerted. Dementia - Pt with slowly progressive pattern. I have discussed 
with pt and family the prognosis of this disease state and the need for the 
family to anticipate further decline with behavior changes. Continue with 
current plan of treatment.

                                                            2020

 

             Patient Education: Patient Medication Summary                      

     Completed    2020







Instructions





                          Comment                   Date

 

                                        . Hypertension - well controlled - pop

nue with current medications, continue 

with no added salt diet.  Pt has been encouraged to exercise daily.

The pt has been advised to call the office if there are any acute concerns about
change in blood pressure readings at home.



Hypothyroidism - pt with chronic hypothyroidism, continue with current 
medication, will monitor pt for signs or symptoms of lack of adequate 
supplementation.  Pt is to continue with current dose of medication unless 
directed otherwise.  Check labs at regular intervals q 3 months or q 6 months 
based on previous levels of control.



B12 def -injection today 

                                        2021

 

                                        HAVE HOME HEALTH DRAW LABS WHEN THEY DO 

NEXT B12 INJECTION - CBC, CMP, TSH, FREE

T4, BNP, MAGNESIUM . Hypertension - well controlled - continue with current 
medications, continue with no added salt diet.  Pt has been encouraged to 
exercise daily.

The pt has been advised to call the office if there are any acute concerns about
change in blood pressure readings at home.



Anemia- recheck labs -continue b12 injections 



Edema - pt has been advised to elevate legs to prevent dependent edema, 
compression has been recommended to help to naturally decrease peripheral edema.
 Diuretic use has been discussed and pt has been instructed in appropriate use 
of such medication as necessary to further attempt to reduce peripheral edema.



Hypothyroidism -check levels with next labs  06/15/2021

 

                                        CONTINUE LOW SODIUM DIET



CONTINUE HOME HEALTH WITH [facility] B12 INJECTIONS



FOLLOW UP WITH DR OVALLE FOR PFTS AND CT SCAN

                                        . Hypertension - well controlled - pop

nue with current medications, continue 

with no added salt diet.  Pt has been encouraged to exercise daily.

The pt has been advised to call the office if there are any acute concerns about
change in blood pressure readings at home.



CHF - congestive heart failure - Pt has Chronic congestive heart failure - and 
is currently fairly well maintained on the current medications.  Today there is 
no change in the treatment course.  If symptoms worsen, increase edema or more 
that 2-3 pound weight gain over a week without improvement in the symptoms with 
a decrease in the sodium of the diet, then the pt is to have the office alerted.



Anemia- continue with B12 injections per  

                                        2021

 

                                        . Acute on chronic CHF - patient refuses

 cardiology -continue with oxygen at all

times- patient did not wear oxygen to appt- patient and sister verbalized 
understanding of plan. Did recommend follow up with cardiology due to recurrent 
hospitalizations for CHF and patient continues to refuse 



Anemia- repeat labs today 



HTN -controlled- no change in medications today  2021

 

                                        . Chronic Congestive heart failure - sym

ptoms stable on lasix  -pt has refused 

referral to Cardiology - she is not interested in any other physician and will 
not go to another specialist per her report - pt to continue with chronic oxygen
therapy.



Hypertension - well controlled - continue with current medications, continue 
with no added salt diet.  Pt has been encouraged to exercise daily.

The pt has been advised to call the office if there are any acute concerns about
change in blood pressure readings at home.



Hypothyroidism - pt with chronic hypothyroidism, continue with current 
medication, will monitor pt for signs or symptoms of lack of adequate 
supplementation.  Pt is to continue with current dose of medication unless 
directed otherwise.  Check labs at regular intervals q 3 months or q 6 months 
based on previous levels of control.

                                        2021

 

                                        . Chronic Congestive heart failure - sym

ptoms stable on lasix  - need to 

initiate referral to Dr. Mendoza - if not already done - pt to continue with 
chronic oxygen therapy - due to her weakness and need for easier to transport of
the oxygen from place to place, I have recommended that she needs a portable 
oxygen concentrator.  She is too weak to transport large oxygen bottles from 
place to place and is too weak to move them up the stairs in her home.  She has 
tripped over her oxygen tubing from the large oxygen concentrator in her house, 
therefore the extra long tubing is not safe to have in her home.  She also has 
trouble getting the oxygen tanks moved from house to vehicles and around places 
if she goes shopping.



Hypertension - uncontrolled - the patient's medications have been modified as 
documented in the visit note.  The patient has been counseled to cut back on 
salt in diet for a no added salt diet, low fat diet, start an exercise program 
with low weight bearing exercises and higher aerobic activity for heart health. 


The patient is to check blood pressure readings as an outpatient and either fax,
call, or email the readings to the office next week for practitioner to review.

The pt is to call for acute concerns.

Increase losartan from 50mg daily to 50mg twice daily.



Hypothyroidism - pt with chronic hypothyroidism, continue with current 
medication, will monitor pt for signs or symptoms of lack of adequate 
supplementation.  Pt is to continue with current dose of medication unless 
directed otherwise.  Check labs at regular intervals q 3 months or q 6 months 
based on previous levels of control.



Fasting labs to be done to be drawn by [facility]. 10/15/2020

 

                                        Will set up with [facility]



pt is to consider moving to assisted living



pt is to get a life alert bracelet. CHF - congestive heart failure - Pt has 
Chronic congestive heart failure - and is currently fairly well maintained on 
the current medications.  Today there is no change in the treatment course.  If 
symptoms worsen, increase edema or more that 2-3 pound weight gain over a week 
without improvement in the symptoms with a decrease in the sodium of the diet, 
then the pt is to have the office alerted.



Dementia - Pt with slowly progressive pattern.  I have discussed with pt and 
family the prognosis of this disease state and the need for the family to 
anticipate further decline with behavior changes.  Continue with current plan of
treatment.

                                        2020







Medical Equipment

No Medical Equipment data



Health Concerns Section

Health Concerns data not found



Goals Section

Goals data not found



Interventions Section

Interventions data not found



Health Status Evaluations/Outcomes Section

Health Status Evaluations/Outcomes data not found



Advance Directives

No Advance Directive data

## 2022-01-03 NOTE — XMS REPORT
CCD document using C-CDA

                             Created on: 2021



Tierra Rizvi

External Reference #: [id number]

: 1934

Sex: Female



Demographics





                          Address                   [address]  [place]  [address]

 

                          Home Phone                [phone]

 

                          Preferred Language        Unknown

 

                          Marital Status            Unknown

 

                          Faith Affiliation     Unknown

 

                          Race                      White

 

                          Ethnic Group              Not  or 





Author





                          Author                    Tierra Moreno

 

                          Organization              Kimberly Souza MD, [facility]

 

                          Address                   [address] [place]  [address]



 

                          Phone                     [phone]







Care Team Providers





                    Care Team Member Name Role                Phone

 

                    Kimberly Souza     PP                  Unavailable

 

                          CCM                       Unavailable







Summary Purpose

Interface Exchange



Insurance Providers





             Payer name   Policy type / Coverage type Covered party ID Effective

 Begin Date 

Effective End Date

 

             [employer] Medicare Part B Medicare Part B [id number]  Unknown      Unkno

wn

 

             [facility] Medicare Part B [id number] Unkno

wn      Unknown







Family history





Sister



                          Diagnosis                 Age At Onset

 

                          Arthritis                 Unknown

 

                          Hypertension              Unknown

 

                          Anemia                    Unknown

 

                          Diabetes mellitus Type 2  Unknown

 

                          Osteoporosis              Unknown







Son



                          Diagnosis                 Age At Onset

 

                          Myocardial infarction     Unknown

 

                          Hypertension              Unknown

 

                          Diabetes mellitus Type 2  Unknown







Father



                          Diagnosis                 Age At Onset

 

                          Cancer                    Unknown

 

                          Alcoholism                Unknown

 

                          kidney disease            Unknown







Mother



                          Diagnosis                 Age At Onset

 

                          Cancer                    Unknown







Brother



                          Diagnosis                 Age At Onset

 

                          Hypertension              Unknown

 

                          Cancer                    Unknown

 

                          Alcoholism                Unknown

 

                          Diabetes mellitus Type 2  Unknown

 

                          kidney disease            Unknown

 

                          Myocardial infarction     Unknown







Social History





                Social History Element Codes           Description     Effective

 Dates

 

                Marital status  Unknown                  2020

 

                Employment      Unknown         Retired         2020

 

                    Tobacco history     SNOMED CT: 1734587  Quit over 10 years a

go

                          2020

 

                Alcohol history SNOMED CT: 581558844 Never drinks alcohol 2020







Allergies, Adverse Reactions, Alerts





           Substance  Reaction   Codes      Entered Date Inactivated Date Status

 

           Penicillin rash,      Unknown    2020 No Inactive Date Active

 

             * OTHER REACTION - SEE ANSWER BOX Tetracycline- Rash Unknown      0

2020   No 

Inactive Date                           Active







Problems





                Condition       Codes           Effective Dates Condition Status

 

                          Anemia                    ICD-10: D64.9

ICD-9: 285.9              2021                Active

 

                spinal stenosis Unknown         10/25/2021      Active

 

                          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

ication ICD-10: M48.062

ICD-9: 724.03             10/25/2021                Active

 

                          CHF (congestive heart failure) ICD-10: I50.9

ICD-9: 428.0              2020                Active

 

                          On supplemental oxygen therapy ICD-10: Z99.81

ICD-9: V46.2              10/15/2020                Active

 

                          Panlobular emphysema      ICD-10: J43.1

ICD-9: 492.8              2021                Active

 

                          Atrophy of thyroid (acquired) ICD-10: E03.4

ICD-9: 244.8              10/15/2020                Active

 

                          Essential (primary) hypertension ICD-10: I10

ICD-9: 401.1              10/15/2020                Active

 

                          Vitamin B12 deficiency (dietary) anemia ICD-10: D51.8

ICD-9: 281.1              2021                Active

 

                          Localized edema           ICD-10: R60.0

ICD-9: 782.3              06/15/2021                Active

 

                          Acute on chronic diastolic congestive heart failure IC

D-10: I50.33

ICD-9: 428.33             2021                Active

 

                          Dementia without behavioral disturbance, unspecified d

ementia type ICD-10: 

F03.90

ICD-9: 294.20             2020                Active







Medications





          Medication Codes     Instructions Start Date Stop Date Status    Fill 

Instructions

 

                          albuterol sulfate 2.5 mg/3 mL (0.083 %) solution for n

ebulization RxNorm: 164773

             USE 1 VIAL IN NEBULIZER TWICE DAILY 2021   Activ

e        

 

                          albuterol sulfate 2.5 mg/3 mL (0.083 %) solution for n

ebulization RxNorm: 578973

             USE 1 VIAL IN NEBULIZER TWICE DAILY 2021   Activ

e        

 

                          albuterol sulfate 2.5 mg/3 mL (0.083 %) solution for n

ebulization RxNorm: 492935

             USE 1 VIAL IN NEBULIZER TWICE DAILY 2021   Activ

e        

 

                losartan 50 mg tablet RxNorm: 168997  Take 1 Tablet(s) Oral two 

times a day 

12/10/2021          2022          Active               

 

                    cyanocobalamin (vit B-12) 1,000 mcg/mL injection solution Rx

Norm: 097025      1 

Milliliter(s) Injection monthly 2021      Active          

will need 

syringe/needle for monthly injection dx b12 deficiency

 

                levothyroxine 112 mcg tablet RxNorm: 958975  Take 1 Tablet(s) Or

al every day 

2021          Active               

 

                    hydrocodone 5 mg-acetaminophen 325 mg tablet RxNorm: 144267 

     Take 1 Tablet(s) 

Oral three times a day as needed for pain 2021      Active

           

 

                    cyanocobalamin (vit B-12) 1,000 mcg/mL injection solution Rx

Norm: 252960      1 

Milliliter(s) Injection monthly 2021      Inactive        

will need 

syringe/needle for monthly injection dx b12 deficiency

 

                    cyanocobalamin (vit B-12) 1,000 mcg/mL injection solution Rx

Norm: 946964      1 

Milliliter(s) Injection monthly 2021      Inactive        

will need 

syringe/needle for monthly  injection dx b12 deficiency

 

                levothyroxine 112 mcg tablet RxNorm: 336031  1 Tablet(s) Oral ev

berta day 

2021          Inactive             

 

                    hydrocodone 5 mg-acetaminophen 325 mg tablet RxNorm: 446339 

     Take 1 Tablet(s) 

Oral three times a day as needed for pain 10/25/2021      10/25/2021      Inacti

ve         

 

                          albuterol sulfate 2.5 mg/3 mL (0.083 %) solution for n

ebulization RxNorm: 069600

             USE 1 VIAL IN NEBULIZER TWICE DAILY 10/19/2021   10/19/2021   Inact

maryjo      

 

                          albuterol sulfate 2.5 mg/3 mL (0.083 %) solution for n

ebulization RxNorm: 685336

             USE 1 VIAL IN NEBULIZER TWICE DAILY 10/19/2021   10/19/2021   Inact

maryjo      

 

                levothyroxine 112 mcg tablet RxNorm: 531632  1 Tablet(s) Oral ev

berta day 

10/07/2021          10/07/2021          Inactive             

 

                levothyroxine 112 mcg tablet RxNorm: 800242  1 Tablet(s) Oral ev

berta day 

10/07/2021          10/07/2021          Inactive             

 

                levothyroxine 112 mcg tablet RxNorm: 152671  1 Tablet(s) Oral ev

berta day 

2021          10/07/2021          Inactive             

 

                    cyanocobalamin (vit B-12) 1,000 mcg/mL injection solution Rx

Norm: 579955      Take 

Milliliter(s) Injection 2021      Inactive         

 

                    cyanocobalamin (vit B-12) 1,000 mcg/mL injection solution Rx

Norm: 563888      Take 

Milliliter(s) Injection 2021      Inactive         

 

                          albuterol sulfate 2.5 mg/3 mL (0.083 %) solution for n

ebulization RxNorm: 315153

             1 Unit Dose Inhalation two times a day DX J43.1 2021   Inactive      

 

                losartan 50 mg tablet RxNorm: 994711  1 Tablet(s) Oral two times

 a day 2021          Inactive             

 

                          albuterol sulfate 2.5 mg/3 mL (0.083 %) solution for n

ebulization RxNorm: 697857

             1 Unit Dose Inhalation two times a day DX J43.1 2021   Inactive      

 

                          albuterol sulfate 2.5 mg/3 mL (0.083 %) solution for n

ebulization RxNorm: 964266

             1 Unit Dose Inhalation two times a day 2021   In

active      

 

                          albuterol sulfate 2.5 mg/3 mL (0.083 %) solution for n

ebulization RxNorm: 882334

             1 Unit Dose Inhalation two times a day 2021   In

active      

 

             Norvasc 5 mg tablet RxNorm: 455957 1 Tablet(s) Oral every day 2022                Active                     

 

             Norvasc 5 mg tablet RxNorm: 711721 1 Tablet(s) Oral every day 2021                Inactive                   

 

                    metoprolol succinate ER 25 mg tablet,extended release 24 hr 

RxNorm: 754997      1 

Tablet(s) Oral every day 2021      No Stop Date    Active           

 

                    Ventolin HFA 90 mcg/actuation aerosol inhaler RxNorm: 773254

      1-2 Puff(s) 

Inhalation Every 4 hrs as needed 2021      No Stop Date    Active         

  

 

             furosemide 20 mg tablet RxNorm: 718832 1 Tablet(s) Oral every day 0

2021   No 

Stop Date                 Active                     

 

                    furosemide 40 mg tablet RxNorm: 816529      1 Tablet(s) Oral

 as directed 40mg BID x 5

days then 40mg in am and 20mg afternoon thereafter 2020          

Inactive                                give qty sufficient

 

                    potassium chloride ER 10 mEq tablet,extended release RxNorm:

 234420      1 Tablet(s) 

Oral as directed 1 tab bid x 5 days then resume daily 2020          

Inactive                                qty sufficient

 

                    potassium bicarbonate-citric acid 10 mEq effervescent tablet

 RxNorm: 6066403     1 

Tablet(s) Oral every day 10/15/2020      2020      Inactive         

 

                levothyroxine 125 mcg tablet RxNorm: 069961  1 Tablet(s) Oral ev

berta day 

10/15/2020          2021          Inactive             

 

                losartan 50 mg tablet RxNorm: 857435  1 Tablet(s) Oral two times

 a day 10/15/2020

                    2021          Inactive             

 

             furosemide 40 mg tablet RxNorm: 518318 1 Tablet(s) Oral every day 1

0/15/2020   

2020                Inactive                   

 

             Zyrtec 10 mg tablet RxNorm: 0983255 1 Tablet(s) Oral every day 10/0

2020   

2021                Inactive                   

 

                levothyroxine 125 mcg tablet RxNorm: 674064  1 Tablet(s) Oral ev

berta day 

10/05/2020          10/14/2020          Inactive             

 

             Zyrtec 10 mg tablet RxNorm: 2513313 1 Tablet(s) Oral every day 10/0

2020   

10/04/2020                Inactive                   

 

                aspirin 81 mg tablet,delayed release RxNorm: 785123  1 Tablet(s)

 Oral every day 

2020          No Stop Date        Active               

 

             Vitamin C 250 mg tablet RxNorm: 398838 1 Tablet(s) Oral every day 0

2020   No 

Stop Date                 Active                     

 

                Vitamin D3 50 mcg (2,000 unit) tablet RxNorm: 749394  1 Tablet(s

) Oral every day 

2020          No Stop Date        Active               

 

                levothyroxine 88 mcg tablet RxNorm: 030698  1 Tablet(s) Oral ledy

ry day 2020

                    10/04/2020          Inactive             

 

             losartan 50 mg tablet RxNorm: 315910 1 Tablet(s) Oral every day 09/

   

10/14/2020                Inactive                   

 

             furosemide 40 mg tablet RxNorm: 158946 1 Tablet(s) Oral every day 0

2020   

10/14/2020                Inactive                   

 

                    potassium bicarbonate-citric acid 10 mEq effervescent tablet

 RxNorm: 9109327     1 

Tablet(s) Oral every day 2020      10/14/2020      Inactive         

 

          Women's Multivitamin oral RxNorm:   oral      2020           Act

maryjo     

 

          Calcium 600 oral RxNorm: 1897 oral      2020           Active   

  

 

          albuterol sulfate inhalation RxNorm: 446491 inhalation 2020     

      Active     







Medication Administered





             Medication   Codes        Instructions Start Date   Status

 

                    cyanocobalamin (vit B-12) 1,000 mcg/mL injection solution Rx

Norm: 848013      

Milliliter                2021                No longer Active

 

                    cyanocobalamin (vit B-12) 1,000 mcg/mL injection solution Rx

Norm: 776778      

Milliliter                2021                No longer Active







Immunizations





             Vaccine      Codes        Dose         Date         Status

 

             Covid-19     CVX: 207                  04/10/2021    

 

             Covid-19     CVX: 207                  2021    

 

             Pneumococcal (Adult) Unknown                   2019    

 

             Zoster       CVX: 121                  2017    







Results





          Observation Observation Code Item      Item Code Result    Date      S

ervice Location

 

          Ferritin  Ord22     FERRITIN            16.7 ng/mL 2021 Unknown

 

          [facility]      Ord40     Iron                28 ug/dl  2021 Unknown

 

          [facility]      Ord40     UIBC                491 ug/dL 2021 Unknown

 

          [facility]      Ord40     TIBC                519 ug/dL 2021 Unknown

 

          [facility]      Ord40     Fe-%Sat             5.4 %     2021 Unknown

 

          Cbc With Differential Ord2      WBC                 8.78 K/ul 20 Unknown

 

          Cbc With Differential Ord2      RBC                 3.36 M/ul 20 Unknown

 

          Cbc With Differential Ord2      HGB                 8.8 g/dl  20 Unknown

 

          Cbc With Differential Ord2      Neut%               57.3 %    20 Unknown

 

          Cbc With Differential Ord2      HCT                 29.6 %    20 Unknown

 

          Cbc With Differential Ord2      Lymph%              32.8 %    20 Unknown

 

          Cbc With Differential Ord2      MCV                 88.1 fl   20 Unknown

 

          Cbc With Differential Ord2      MCH                 26.2 pg   20 Unknown

 

          Cbc With Differential Ord2      Mono%               7.2 %     12/21/20

21 Unknown

 

          Cbc With Differential Ord2      Eos%                2.6 %     20

21 Unknown

 

          Cbc With Differential Ord2      MCHC                29.7 pg   20

21 Unknown

 

          Cbc With Differential Ord2      Baso%               0.1 %     20

21 Unknown

 

          Cbc With Differential Ord2      PLT                 343 K/ul  20

21 Unknown

 

          Cbc With Differential Ord2      RDW                 14.8 %    20

21 Unknown

 

          Cbc With Differential Ord2      Neut ABS#           5.03 K/ul 20

21 Unknown

 

          Cbc With Differential Ord2      Lymph ABS#           2.88 K/ul 

021 Unknown

 

          Cbc With Differential Ord2      Mono ABS#           0.6 K/ul  20

21 Unknown

 

          Cbc With Differential Ord2      Eos ABS#            0.2 K/ul  20

21 Unknown

 

          Cbc With Differential Ord2      Baso ABS#           0.0 K/ul  20

21 Unknown

 

          Tsh       Ord6      TSH (3rd IS)           1.03 uIU/mL 2021 Un[facility]

own

 

          Free T4   Dvn150    FREE T4             1.11 ng/dL 2021 Unknown

 

          Cbc With Differential Ord2      WBC                 8.64 K/ul 20

21 Unknown

 

          Cbc With Differential Ord2      RBC                 3.76 M/ul 20

21 Unknown

 

          Cbc With Differential Ord2      HGB                 9.9 g/dl  20

21 Unknown

 

          Cbc With Differential Ord2      Neut%               61.9 %    20

21 Unknown

 

          Cbc With Differential Ord2      HCT                 33.4 %    20

21 Unknown

 

          Cbc With Differential Ord2      MCV                 88.8 fl   20

21 Unknown

 

          Cbc With Differential Ord2      Lymph%              28.1 %    20

21 Unknown

 

          Cbc With Differential Ord2      Mono%               8.0 %     20

21 Unknown

 

          Cbc With Differential Ord2      MCH                 26.3 pg   20

21 Unknown

 

          Cbc With Differential Ord2      MCHC                29.6 pg   20

21 Unknown

 

          Cbc With Differential Ord2      Eos%                1.9 %     20

21 Unknown

 

          Cbc With Differential Ord2      PLT                 332 K/ul  20

21 Unknown

 

          Cbc With Differential Ord2      Baso%               0.1 %     20

21 Unknown

 

          Cbc With Differential Ord2      RDW                 15.2 %    20

21 Unknown

 

          Cbc With Differential Ord2      Neut ABS#           5.35 K/ul 20

21 Unknown

 

          Cbc With Differential Ord2      Lymph ABS#           2.43 K/ul 

021 Unknown

 

          Cbc With Differential Ord2      Mono ABS#           0.7 K/ul  20

21 Unknown

 

          Cbc With Differential Ord2      Eos ABS#            0.2 K/ul  20

21 Unknown

 

          Cbc With Differential Ord2      Baso ABS#           0.0 K/ul  20

21 Unknown

 

          Tsh       Ord6      TSH (3rd IS)           0.21 uIU/mL 2021 Un[facility]

own

 

          Comp Metabolic Iko280    NA                  141 mEq/L 2021 [facility]

own

 

          Comp Metabolic Nxb417    K                   4.1 mEq/L 2021 [facility]

own

 

          Comp Metabolic Zin617    CL                  104 mEq/L 2021 [facility]

own

 

          Comp Metabolic Leb112    CO2                 28.0 mEq/L 2021 Unk

nown

 

          Comp Metabolic Uvr866    ANION GAP           13        2021 [facility]

own

 

          Comp Metabolic Qse239    GLUCOSE             78 mg/dL  2021 Un[facility]

own

 

          Comp Metabolic Txv694    Creat               0.8 mg/dL 2021 Un[facility]

own

 

          Comp Metabolic Ozo939    eGFR                71 ml/min/1.73m2 20

21 Unknown

 

          Comp Metabolic Uop558    BUN                 25 mg/dL  2021 Un[facility]

own

 

          Comp Metabolic Rke779    B/C Ratio           30.9 Ratio 2021 Unk

nown

 

          Comp Metabolic Scq170    CALCIUM             8.7 mg/dL 2021 Un[facility]

own

 

          Comp Metabolic Hfs103    ALK PHOS            61 U/L    2021 Un[facility]

own

 

          Comp Metabolic Jyu244    AST(SGOT)           17 U/L    2021 Un[facility]

own

 

          Comp Metabolic Exm292    ALT(SGPT)           10 U/L    2021 Unkn

own

 

          Comp Metabolic Hnu325    BILI T              0.6 mg/dL 2021 Un[facility]

own

 

          Comp Metabolic Qlm034    ALBUMIN             3.7 g/dL  2021 Un[facility]

own

 

          Comp Metabolic Qed250    TPRO                6.1 g/dL  2021 Unkn

own

 

          Comp Metabolic Gpi603    GLOB                2.4 g/dL  2021 Unkn

own

 

          Comp Metabolic Zqc708    A/G Ratio           1.6 Ratio 2021 Unkn

own

 

          Comp Metabolic Vox160    Osmo                285 mOsmo 2021 Unkn

own

 

          B12       Sqq744    B12                 >1500.00 pg/ml 2021 Un[facility]

own

 

          Free T4   Pii622    FREE T4             1.23 ng/dL 2021 Unknown

 

          [facility]      Ord40     Iron                32 ug/dl  2021 Unknown

 

          [facility]      Ord40     UIBC                402 ug/dL 2021 Unknown

 

          [facility]      Ord40     TIBC                434 ug/dL 2021 Unknown

 

          [facility]      Ord40     Fe-%Sat             7.4 %     2021 Unknown

 

          Ferritin  Ord22     FERRITIN            27.6 ng/mL 2021 Unknown

 

          B12       Jkt639    B12                 187.00 pg/ml 2021 Unknow

n

 

          Comp Metabolic Caz155    NA                  139 mEq/L 2021 [facility]

own

 

          Comp Metabolic Khn347    K                   4.1 mEq/L 2021 [facility]

own

 

          Comp Metabolic Ywi957    CL                  103 mEq/L 2021 [facility]

own

 

          Comp Metabolic Waw289    CO2                 27.0 mEq/L 2021 Unk

nown

 

          Comp Metabolic Ywk863    ANION GAP           13        2021 Un[facility]

own

 

          Comp Metabolic Bcw957    GLUCOSE             79 mg/dL  2021 [facility]

own

 

          Comp Metabolic Zfi190    Creat               0.7 mg/dL 2021 [facility]

own

 

          Comp Metabolic Nao572    eGFR                80 ml/min/1.73m2 20

21 Unknown

 

          Comp Metabolic Ert482    BUN                 21 mg/dL  2021 [facility]

own

 

          Comp Metabolic Hot299    B/C Ratio           28.8 Ratio 2021 Unk

nown

 

          Comp Metabolic Wvu214    CALCIUM             8.7 mg/dL 2021 [facility]

own

 

          Comp Metabolic Wxe370    ALK PHOS            74 U/L    2021 [facility]

own

 

          Comp Metabolic Hvi729    AST(SGOT)           15 U/L    2021 [facility]

own

 

          Comp Metabolic Skj458    ALT(SGPT)           17 U/L    2021 Un[facility]

own

 

          Comp Metabolic Rxh468    BILI T              0.4 mg/dL 2021 [facility]

own

 

          Comp Metabolic Lym693    ALBUMIN             3.5 g/dL  2021 [facility]

own

 

          Comp Metabolic Jtm350    TPRO                5.9 g/dL  2021 [facility]

own

 

          Comp Metabolic Hkb470    GLOB                2.4 g/dL  2021 Un[facility]

own

 

          Comp Metabolic Dso564    A/G Ratio           1.4 Ratio 2021 Unkn

own

 

          Comp Metabolic Bvy463    Osmo                279 mOsmo 2021 Un[facility]

own

 

          Cbc With Differential Ord2      WBC                 7.63 K/ul 20

21 Unknown

 

          Cbc With Differential Ord2      RBC                 3.38 M/ul 20

21 Unknown

 

          Cbc With Differential Ord2      HGB                 9.4 g/dl  20

21 Unknown

 

          Cbc With Differential Ord2      HCT                 31.5 %    20

21 Unknown

 

          Cbc With Differential Ord2      Neut%               52.6 %    20

21 Unknown

 

          Cbc With Differential Ord2      MCV                 93.2 fl   20

21 Unknown

 

          Cbc With Differential Ord2      Lymph%              33.8 %    20

21 Unknown

 

          Cbc With Differential Ord2      MCH                 27.8 pg   20

21 Unknown

 

          Cbc With Differential Ord2      Mono%               7.9 %     20

21 Unknown

 

          Cbc With Differential Ord2      Eos%                5.4 %     20

21 Unknown

 

          Cbc With Differential Ord2      MCHC                29.8 pg   20

21 Unknown

 

          Cbc With Differential Ord2      PLT                 329 K/ul  20

21 Unknown

 

          Cbc With Differential Ord2      Baso%               0.3 %     20

21 Unknown

 

          Cbc With Differential Ord2      RDW                 15.2 %    20

21 Unknown

 

          Cbc With Differential Ord2      Neut ABS#           4.02 K/ul 20

21 Unknown

 

          Cbc With Differential Ord2      Lymph ABS#           2.58 K/ul 

021 Unknown

 

          Cbc With Differential Ord2      Mono ABS#           0.6 K/ul  20

21 Unknown

 

          Cbc With Differential Ord2      Eos ABS#            0.4 K/ul  20

21 Unknown

 

          Cbc With Differential Ord2      Baso ABS#           0.0 K/ul  20

21 Unknown







Procedures





                    Procedure           Codes               Date

 

                    THER/PROPH/DIAG INJ SC/IM CPT-4: 80099        2021

 

                    VITAMIN B12 INJECTION 1000 mcg CPT-4:         

 

                    THER/PROPH/DIAG INJ SC/IM CPT-4: 11968        2021







Vital Signs





                          Date                      Vital

 

                2021      SpO2: 96%       SpO2: 87%       SpO2: 94%

 

                2021      Blood Pressure 1: 138/82 Code: 8480-6 BMI: 33.5 

Code: 25846-6 Heart 

Rate 1: 74 bpm      Height: 5'3" Code: 8302-2 SpO2: 98%           Temperature: 3

6.2 (C) / 97.1 

(F)                                     Weight: 189 lbs  Code: 44308-2

 

                06/15/2021      Blood Pressure 1: 134/80 Code: 8480-6 Heart Rate

 1: 77 bpm Height: 

5'3" Code: 8302-2         SpO2: 93%                 Temperature: 36.3 (C) / 97.3

 (F)

 

                2021      Blood Pressure 1: 136/88 Code: 8480-6 Heart Rate

 1: 60 bpm Height:  

Code: 8302-2        SpO2: 93%           Temperature: 36.3 (C) / 97.3 (F) Weight:

 174 lbs  Code: 

44718-5

 

                2021      Blood Pressure 1: 164/92 Code: 8480-6 BMI: 31.4 

Code: 66138-8 Heart 

Rate 1: 67 bpm      Height: 5'3" Code: 8302-2 SpO2: 94%           Temperature: 3

6.2 (C) / 97.1 

(F)                                     Weight: 177 lbs  Code: 24178-2

 

                2021      Blood Pressure 1: 134/72 Code: 8480-6 BMI: 30.8 

Code: 72531-4 Heart 

Rate 1: 74 bpm  Height: 5'3" Code: 8302-2 Respiratory Rate: 18 bpm SpO2: 95%    

   

Temperature: 36.3 (C) / 97.4 (F)        Weight: 174 lbs  Code: 10848-8

 

                10/15/2020      Blood Pressure 1: 202/94 Code: 8480-6 BMI: 30.6 

Code: 65304-2 Heart 

Rate 1: 73 bpm  Height: 5'3" Code: 8302-2 Respiratory Rate: 17 bpm SpO2: 91%    

   

Temperature: 36.8 (C) / 98.2 (F)        Weight: 173 lbs  Code: 59536-4

 

                2020      Blood Pressure 1: 144/90 Code: 8480-6 BMI: 29.4 

Code: 31366-9 Heart 

Rate 1: 72 bpm      Height: 5'3" Code: 8302-2 SpO2: 98%           Temperature: 3

6.7 (C) / 98.0 

(F)                                     Weight: 166 lbs  Code: 93015-3







Functional Status

No Functional Status data



Reason For Visit





                    Reason For Visit    Effective Dates     Notes

 

                    hypertension        2021           

 

                    hypertension        06/15/2021           

 

                    hypertension        2021           

 

                    Hospital Follow Up  2021           

 

                    shortness of breath 2021           

 

                    shortness of breath 10/15/2020           

 

                    Hospital Follow Up  2020           







Encounters





             Encounter    Performer    Location     Codes        Date

 

                                         EST. PATIENT, LEVEL I

Diagnosis: CHF (congestive heart failure)[ICD10: I50.9]

Diagnosis: On supplemental oxygen therapy[ICD10: Z99.81]

Diagnosis: Panlobular emphysema[ICD10: J43.1] Kimberly rhodes MD, 

[facility]                       CPT-4: 25670              2021

 

                                        [phone] EST. PATIENT, LEVEL IV

Diagnosis: Vitamin B12 deficiency (dietary) anemia[ICD10: D51.8]

Diagnosis: Essential (primary) hypertension[ICD10: I10]

Diagnosis: Atrophy of thyroid (acquired)[ICD10: E03.4] Franca Souza MD, [facility]          CPT-4: 95680              2021

 

                                        [phone] EST. PATIENT, LEVEL IV

Diagnosis: Essential (primary) hypertension[ICD10: I10]

Diagnosis: Vitamin B12 deficiency (dietary) anemia[ICD10: D51.8]

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

Diagnosis: Localized edema[ICD10: R60.0] Franca mejia MD, [facility]

                          CPT-4: 55518              06/15/2021

 

                                        [phone] EST. PATIENT, LEVEL IV

Diagnosis: CHF (congestive heart failure)[ICD10: I50.9]

Diagnosis: Essential (primary) hypertension[ICD10: I10]

Diagnosis: Vitamin B12 deficiency (dietary) anemia[ICD10: D51.8] Franca Souza MD, [facility] CPT-4: 08156        2021

 

                                        [phone] EST. PATIENT, LEVEL IV

Diagnosis: Acute on chronic diastolic congestive heart failure[ICD10: I50.33]

Diagnosis: Anemia[ICD10: D64.9]

Diagnosis: Essential (primary) hypertension[ICD10: I10] [facility]          CPT-4: 60787              2021

 

                                        [phone] EST. PATIENT, LEVEL IV

Diagnosis: Essential (primary) hypertension[ICD10: I10]

Diagnosis: CHF (congestive heart failure)[ICD10: I50.9]

Diagnosis: Atrophy of thyroid (acquired)[ICD10: E03.4] [facility]          CPT-4: 81920              2021

 

                                        [phone] EST. PATIENT, LEVEL IV

Diagnosis: Essential (primary) hypertension[ICD10: I10]

Diagnosis: CHF (congestive heart failure)[ICD10: I50.9]

Diagnosis: On supplemental oxygen therapy[ICD10: Z99.81]

Diagnosis: Atrophy of thyroid (acquired)[ICD10: E03.4] [facility]          CPT-4: 49317              10/15/2020

 

                                        OFFICE  VISIT, NEW - LEVEL 3

Diagnosis: CHF (congestive heart failure)[ICD10: I50.9]

Diagnosis: Dementia without behavioral disturbance, unspecified dementia 
type[ICD10: F03.90] [facility] CPT-4: 57219    







Plan of Care





             Planned Activity Notes        Codes        Status       Date

 

             Patient Education: Patient Medication Summary                      

     Completed    2021

 

             Patient Education: Patient Medication Summary                      

     Completed    10/25/2021

 

             Appointment:                            Nurse Visit  2021

 

             Patient Education: Patient Medication Summary                      

     Completed    2021

 

                          Visit Plan:               Hypertension - well controll

ed - continue with current medications,

continue with no added salt diet. Pt has been encouraged to exercise daily. The 
pt has been advised to call the office if there are any acute concerns about 
change in blood pressure readings at home. Hypothyroidism - pt with chronic 
hypothyroidism, continue with current medication, will monitor pt for signs or 
symptoms of lack of adequate supplementation. Pt is to continue with current 
dose of medication unless directed otherwise. Check labs at regular intervals q 
3 months or q 6 months based on previous levels of control. B12 def -injection 
today

                                                            2021

 

                                        Appointment: Franca Urbina 

WPtel:[phone]

                                        [address] [place], [place] [id number]

                                              (30 min) Complex 2021

 

             Patient Education: Patient Medication Summary                      

     Completed    2021

 

             Appointment:                            Injection    2021

 

             Patient Education: Patient Medication Summary                      

     Completed    2021

 

             Patient Education: Patient Medication Summary                      

     Completed    2021

 

             Care Plan: Cbc With Differential                           Pending 

     2021

 

             Care Plan: Comp Metabolic                           Pending      

 

             Care Plan: Tsh                           Pending      2021

 

             Care Plan: Magnesium                           Pending      

021

 

             Care Plan: Free T4                           Pending      

 

                          Visit Plan:               Hypertension - well controll

ed - continue with current medications,

continue with no added salt diet. Pt has been encouraged to exercise daily. The 
pt has been advised to call the office if there are any acute concerns about 
change in blood pressure readings at home. Anemia- recheck labs -continue b12 
injections Edema - pt has been advised to elevate legs to prevent dependent 
edema, compression has been recommended to help to naturally decrease peripheral
edema. Diuretic use has been discussed and pt has been instructed in appropriate
use of such medication as necessary to further attempt to reduce peripheral 
edema. Hypothyroidism -check levels with next labs

                                                            06/15/2021

 

                                        Appointment: Franca Urbina 

WPtel:[phone]

                                        [address] [facility]

[place]                                              (30 min) Complex 06/15/2021

 

             Patient Education: Patient Medication Summary                      

     Completed    06/15/2021

 

                                        Appointment: Franca Urbina 

WPtel:[phone]

                                        [address] [facility]

[place]                                              (30 min) Complex 2021

 

                                        Appointment: Kimberly Souza 

WPtel:[phone]

                                        [address] [facility]

[place]                                              (15 min) Moderate 04/15/2021

 

                          Visit Plan:               Hypertension - well controll

ed - continue with current medications,

continue with no added salt diet. Pt has been encouraged to exercise daily. The 
pt has been advised to call the office if there are any acute concerns about 
change in blood pressure readings at home. CHF - congestive heart failure - Pt 
has Chronic congestive heart failure - and is currently fairly well maintained 
on the current medications. Today there is no change in the treatment course. If
symptoms worsen, increase edema or more that 2-3 pound weight gain over a week 
without improvement in the symptoms with a decrease in the sodium of the diet, 
then the pt is to have the office alerted. Anemia- continue with B12 injections 
per 

                                                            2021

 

                                        Appointment: Franca Urbina 

WPtel:[phone] [address] [facility]

[place]                                              (30 min) Complex 2021

 

             Patient Education: Patient Medication Summary                      

     Completed    2021

 

                          Visit Plan:               Acute on chronic CHF - patie

nt refuses cardiology -continue with 

oxygen at all times- patient did not wear oxygen to appt- patient and sister 
verbalized understanding of plan. Did recommend follow up with cardiology due to
recurrent hospitalizations for CHF and patient continues to refuse Anemia- 
repeat labs today HTN -controlled- no change in medications today

                                                            2021

 

                                        Appointment: Franca Urbina 

WPtel:[phone] [address] [facility]

[place]                                              (30 min) Complex 2021

 

             Patient Education: Patient Medication Summary                      

     Completed    2021

 

             Patient Education: Patient Medication Summary                      

     Completed    2021

 

                          Visit Plan:               Chronic Congestive heart sheryl

lure - symptoms stable on lasix -pt has

refused referral to Cardiology - she is not interested in any other physician 
and will not go to another specialist per her report - pt to continue with 
chronic oxygen therapy. Hypertension - well controlled - continue with current 
medications, continue with no added salt diet. Pt has been encouraged to 
exercise daily. The pt has been advised to call the office if there are any 
acute concerns about change in blood pressure readings at home. Hypothyroidism -
pt with chronic hypothyroidism, continue with current medication, will monitor 
pt for signs or symptoms of lack of adequate supplementation. Pt is to continue 
with current dose of medication unless directed otherwise. Check labs at regular
intervals q 3 months or q 6 months based on previous levels of control.

                                                            2021

 

             Patient Education: Patient Medication Summary                      

     Completed    2021

 

                                        Appointment: Kimberly Souza 

WPtel:[phone] [address] [facility]

[place]                                              (30 min) Complex 2020

 

                          Visit Plan:               Chronic Congestive heart sheryl

lure - symptoms stable on lasix - need 

to initiate referral to Dr. Mendoza - if not already done - pt to continue with 
chronic oxygen therapy - due to her weakness and need for easier to transport of
the oxygen from place to place, I have recommended that she needs a portable 
oxygen concentrator. She is too weak to transport large oxygen bottles from 
place to place and is too weak to move them up the stairs in her home. She has 
tripped over her oxygen tubing from the large oxygen concentrator in her house, 
therefore the extra long tubing is not safe to have in her home. She also has 
trouble getting the oxygen tanks moved from house to vehicles and around places 
if she goes shopping. Hypertension - uncontrolled - the patient's medications 
have been modified as documented in the visit note. The patient has been 
counseled to cut back on salt in diet for a no added salt diet, low fat diet, 
start an exercise program with low weight bearing exercises and higher aerobic 
activity for heart health. The patient is to check blood pressure readings as an
outpatient and either fax, call, or email the readings to the office next week 
for practitioner to review. The pt is to call for acute concerns. Increase 
losartan from 50mg daily to 50mg twice daily. Hypothyroidism - pt with chronic 
hypothyroidism, continue with current medication, will monitor pt for signs or 
symptoms of lack of adequate supplementation. Pt is to continue with current 
dose of medication unless directed otherwise. Check labs at regular intervals q 
3 months or q 6 months based on previous levels of control. Fasting labs to be 
done to be drawn by [facility].

                                                            10/15/2020

 

                                        Appointment: Kimberly Souza 

WPtel:[phone]

                                        [address] [place] [id number]

                                              (15 min) Moderate 10/15/2020

 

             Patient Education: Patient Medication Summary                      

     Completed    10/15/2020

 

                          Visit Plan:               CHF - congestive heart failu

re - Pt has Chronic congestive heart 

failure - and is currently fairly well maintained on the current medications. 
Today there is no change in the treatment course. If symptoms worsen, increase 
edema or more that 2-3 pound weight gain over a week without improvement in the 
symptoms with a decrease in the sodium of the diet, then the pt is to have the 
office alerted. Dementia - Pt with slowly progressive pattern. I have discussed 
with pt and family the prognosis of this disease state and the need for the 
family to anticipate further decline with behavior changes. Continue with 
current plan of treatment.

                                                            2020

 

             Patient Education: Patient Medication Summary                      

     Completed    2020







Instructions





                          Comment                   Date

 

                                        . Hypertension - well controlled - pop

nue with current medications, continue 

with no added salt diet.  Pt has been encouraged to exercise daily.

The pt has been advised to call the office if there are any acute concerns about
change in blood pressure readings at home.



Hypothyroidism - pt with chronic hypothyroidism, continue with current 
medication, will monitor pt for signs or symptoms of lack of adequate 
supplementation.  Pt is to continue with current dose of medication unless 
directed otherwise.  Check labs at regular intervals q 3 months or q 6 months 
based on previous levels of control.



B12 def -injection today 

                                        2021

 

                                        HAVE HOME HEALTH DRAW LABS WHEN THEY DO 

NEXT B12 INJECTION - CBC, CMP, TSH, FREE

T4, BNP, MAGNESIUM . Hypertension - well controlled - continue with current 
medications, continue with no added salt diet.  Pt has been encouraged to 
exercise daily.

The pt has been advised to call the office if there are any acute concerns about
change in blood pressure readings at home.



Anemia- recheck labs -continue b12 injections 



Edema - pt has been advised to elevate legs to prevent dependent edema, 
compression has been recommended to help to naturally decrease peripheral edema.
 Diuretic use has been discussed and pt has been instructed in appropriate use 
of such medication as necessary to further attempt to reduce peripheral edema.



Hypothyroidism -check levels with next labs  06/15/2021

 

                                        CONTINUE LOW SODIUM DIET



CONTINUE HOME HEALTH WITH [facility] VITAMIN B12 INJECTIONS



FOLLOW UP WITH DR OVALLE FOR PFTS AND CT SCAN

                                        . Hypertension - well controlled - pop

nue with current medications, continue 

with no added salt diet.  Pt has been encouraged to exercise daily.

The pt has been advised to call the office if there are any acute concerns about
change in blood pressure readings at home.



CHF - congestive heart failure - Pt has Chronic congestive heart failure - and 
is currently fairly well maintained on the current medications.  Today there is 
no change in the treatment course.  If symptoms worsen, increase edema or more 
that 2-3 pound weight gain over a week without improvement in the symptoms with 
a decrease in the sodium of the diet, then the pt is to have the office alerted.



Anemia- continue with B12 injections per  

                                        2021

 

                                        . Acute on chronic CHF - patient refuses

 cardiology -continue with oxygen at all

times- patient did not wear oxygen to appt- patient and sister verbalized 
understanding of plan. Did recommend follow up with cardiology due to recurrent 
hospitalizations for CHF and patient continues to refuse 



Anemia- repeat labs today 



HTN -controlled- no change in medications today  2021

 

                                        . Chronic Congestive heart failure - sym

ptoms stable on lasix  -pt has refused 

referral to Cardiology - she is not interested in any other physician and will 
not go to another specialist per her report - pt to continue with chronic oxygen
therapy.



Hypertension - well controlled - continue with current medications, continue 
with no added salt diet.  Pt has been encouraged to exercise daily.

The pt has been advised to call the office if there are any acute concerns about
change in blood pressure readings at home.



Hypothyroidism - pt with chronic hypothyroidism, continue with current 
medication, will monitor pt for signs or symptoms of lack of adequate 
supplementation.  Pt is to continue with current dose of medication unless 
directed otherwise.  Check labs at regular intervals q 3 months or q 6 months 
based on previous levels of control.

                                        2021

 

                                        . Chronic Congestive heart failure - sym

ptoms stable on lasix  - need to 

initiate referral to Dr. Mendoza - if not already done - pt to continue with 
chronic oxygen therapy - due to her weakness and need for easier to transport of
the oxygen from place to place, I have recommended that she needs a portable 
oxygen concentrator.  She is too weak to transport large oxygen bottles from 
place to place and is too weak to move them up the stairs in her home.  She has 
tripped over her oxygen tubing from the large oxygen concentrator in her house, 
therefore the extra long tubing is not safe to have in her home.  She also has 
trouble getting the oxygen tanks moved from house to vehicles and around places 
if she goes shopping.



Hypertension - uncontrolled - the patient's medications have been modified as 
documented in the visit note.  The patient has been counseled to cut back on 
salt in diet for a no added salt diet, low fat diet, start an exercise program 
with low weight bearing exercises and higher aerobic activity for heart health. 


The patient is to check blood pressure readings as an outpatient and either fax,
call, or email the readings to the office next week for practitioner to review.

The pt is to call for acute concerns.

Increase losartan from 50mg daily to 50mg twice daily.



Hypothyroidism - pt with chronic hypothyroidism, continue with current 
medication, will monitor pt for signs or symptoms of lack of adequate 
supplementation.  Pt is to continue with current dose of medication unless 
directed otherwise.  Check labs at regular intervals q 3 months or q 6 months 
based on previous levels of control.



Fasting labs to be done to be drawn by [facility]. 10/15/2020

 

                                        Will set up with [facility]



pt is to consider moving to assisted living



pt is to get a life alert bracelet. CHF - congestive heart failure - Pt has 
Chronic congestive heart failure - and is currently fairly well maintained on 
the current medications.  Today there is no change in the treatment course.  If 
symptoms worsen, increase edema or more that 2-3 pound weight gain over a week 
without improvement in the symptoms with a decrease in the sodium of the diet, 
then the pt is to have the office alerted.



Dementia - Pt with slowly progressive pattern.  I have discussed with pt and 
family the prognosis of this disease state and the need for the family to 
anticipate further decline with behavior changes.  Continue with current plan of
treatment.

                                        2020







Medical Equipment

No Medical Equipment data



Health Concerns Section

Health Concerns data not found



Goals Section

Goals data not found



Interventions Section

Interventions data not found



Health Status Evaluations/Outcomes Section

Health Status Evaluations/Outcomes data not found



Advance Directives

No Advance Directive data

## 2022-01-03 NOTE — DIAGNOSTIC IMAGING REPORT
PROCEDURE: CT lumbar spine without contrast.



TECHNIQUE: Multiple contiguous axial images were obtained through

the lumbar spine without the use of intravenous contrast.

Sagittal and coronal reformations were then performed. Auto

Exposure Controls were utilized during the CT exam to meet ALARA

standards for radiation dose reduction. 



INDICATION: Fall with back pain.



COMPARISON: 10/18/2021.



FINDINGS: There has been development of compression fracture

deformity involving the superior endplate of L5. There is stable

appearance of the treated L2 vertebral body with associated

posterior cortex retropulsion. There is extensive disc bulging of

the L4-L5 disc with associated ligamentum flavum hypertrophy

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 There is

also disc bulging and endplate spurring, greater toward the right

at L5-S1 causing moderate right neuroforaminal stenosis. There is

mild amount of right pleural fluid.



IMPRESSION:

1. New mild-to-moderate compression fracture of L5 vertebral body

since 10/18/2021. Correlation with site of fall and development

of pain would be useful.

2. 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

moderate right neuroforaminal stenosis at L5-S1 as described.



Dictated by: 



  Dictated on workstation # [id number]

## 2022-01-03 NOTE — XMS REPORT
CCD document using C-CDA

                             Created on: 2021



Tierra Rizvi

External Reference #: [id number]

: 1934

Sex: Female



Demographics





                          Address                   [address]  [place]  [address]

 

                          Home Phone                [phone]

 

                          Preferred Language        Unknown

 

                          Marital Status            Unknown

 

                          Restoration Affiliation     Unknown

 

                          Race                      White

 

                          Ethnic Group              Not  or 





Author





                          Author                    Tierra Moreno

 

                          Organization              Kimberly Souza MD, [facility]

 

                          Address                   [address] [place]  [address]



 

                          Phone                     [phone]







Care Team Providers





                    Care Team Member Name Role                Phone

 

                    Kimberly Souza     PP                  Unavailable

 

                          CCM                       Unavailable







Summary Purpose

Interface Exchange



Insurance Providers





             Payer name   Policy type / Coverage type Covered party ID Effective

 Begin Date 

Effective End Date

 

             [employer] Medicare Part B Medicare Part B [id number]  Unknown      Unkno

wn

 

             [facility] Medicare Part B [id number] Unkno

wn      Unknown







Family history





Sister



                          Diagnosis                 Age At Onset

 

                          Arthritis                 Unknown

 

                          Hypertension              Unknown

 

                          Anemia                    Unknown

 

                          Diabetes mellitus Type 2  Unknown

 

                          Osteoporosis              Unknown







Son



                          Diagnosis                 Age At Onset

 

                          Myocardial infarction     Unknown

 

                          Hypertension              Unknown

 

                          Diabetes mellitus Type 2  Unknown







Father



                          Diagnosis                 Age At Onset

 

                          Cancer                    Unknown

 

                          Alcoholism                Unknown

 

                          kidney disease            Unknown







Mother



                          Diagnosis                 Age At Onset

 

                          Cancer                    Unknown







Brother



                          Diagnosis                 Age At Onset

 

                          Hypertension              Unknown

 

                          Cancer                    Unknown

 

                          Alcoholism                Unknown

 

                          Diabetes mellitus Type 2  Unknown

 

                          kidney disease            Unknown

 

                          Myocardial infarction     Unknown







Social History





                Social History Element Codes           Description     Effective

 Dates

 

                Marital status  Unknown                  2020

 

                Employment      Unknown         Retired         2020

 

                    Tobacco history     SNOMED CT: 7847627  Quit over 10 years a

go

                          2020

 

                Alcohol history SNOMED CT: 711918526 Never drinks alcohol 2020







Allergies, Adverse Reactions, Alerts





           Substance  Reaction   Codes      Entered Date Inactivated Date Status

 

           Penicillin rash,      Unknown    2020 No Inactive Date Active

 

             * OTHER REACTION - SEE ANSWER BOX Tetracycline- Rash Unknown      0

2020   No 

Inactive Date                           Active







Problems





                Condition       Codes           Effective Dates Condition Status

 

                spinal stenosis Unknown         10/25/2021      Active

 

                          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

ication ICD-10: M48.062

ICD-9: 724.03             10/25/2021                Active

 

                          CHF (congestive heart failure) ICD-10: I50.9

ICD-9: 428.0              2020                Active

 

                          On supplemental oxygen therapy ICD-10: Z99.81

ICD-9: V46.2              10/15/2020                Active

 

                          Panlobular emphysema      ICD-10: J43.1

ICD-9: 492.8              2021                Active

 

                          Atrophy of thyroid (acquired) ICD-10: E03.4

ICD-9: 244.8              10/15/2020                Active

 

                          Essential (primary) hypertension ICD-10: I10

ICD-9: 401.1              10/15/2020                Active

 

                          Vitamin B12 deficiency (dietary) anemia ICD-10: D51.8

ICD-9: 281.1              2021                Active

 

                          Localized edema           ICD-10: R60.0

ICD-9: 782.3              06/15/2021                Active

 

                          Acute on chronic diastolic congestive heart failure IC

D-10: I50.33

ICD-9: 428.33             2021                Active

 

                          Anemia                    ICD-10: D64.9

ICD-9: 285.9              2021                Active

 

                          Dementia without behavioral disturbance, unspecified d

ementia type ICD-10: 

F03.90

ICD-9: 294.20             2020                Active







Medications





          Medication Codes     Instructions Start Date Stop Date Status    Fill 

Instructions

 

                    cyanocobalamin (vit B-12) 1,000 mcg/mL injection solution Rx

Norm: 595541      1 

Milliliter(s) Injection monthly 2021      Active          

will need 

syringe/needle for monthly injection dx b12 deficiency

 

                    hydrocodone 5 mg-acetaminophen 325 mg tablet RxNorm: 294416 

     Take 1 Tablet(s) 

Oral three times a day as needed for pain 2021      Active

           

 

                levothyroxine 112 mcg tablet RxNorm: 026406  1 Tablet(s) Oral ev

berta day 

2021          2022          Active               

 

                    cyanocobalamin (vit B-12) 1,000 mcg/mL injection solution Rx

Norm: 638213      1 

Milliliter(s) Injection monthly 2021      Inactive        

will need 

syringe/needle for monthly injection dx b12 deficiency

 

                    cyanocobalamin (vit B-12) 1,000 mcg/mL injection solution Rx

Norm: 471801      1 

Milliliter(s) Injection monthly 2021      Inactive        

will need 

syringe/needle for monthly  injection dx b12 deficiency

 

                    hydrocodone 5 mg-acetaminophen 325 mg tablet RxNorm: 992675 

     Take 1 Tablet(s) 

Oral three times a day as needed for pain 10/25/2021      10/25/2021      Inacti

ve         

 

                          albuterol sulfate 2.5 mg/3 mL (0.083 %) solution for n

ebulization RxNorm: 145227

             USE 1 VIAL IN NEBULIZER TWICE DAILY 10/19/2021   2021   Activ

e        

 

                          albuterol sulfate 2.5 mg/3 mL (0.083 %) solution for n

ebulization RxNorm: 551692

             USE 1 VIAL IN NEBULIZER TWICE DAILY 10/19/2021   10/19/2021   Inact

maryjo      

 

                levothyroxine 112 mcg tablet RxNorm: 718649  1 Tablet(s) Oral ev

berta day 

10/07/2021          10/07/2021          Inactive             

 

                levothyroxine 112 mcg tablet RxNorm: 259446  1 Tablet(s) Oral ev

berta day 

10/07/2021          10/07/2021          Inactive             

 

                levothyroxine 112 mcg tablet RxNorm: 691101  1 Tablet(s) Oral ev

berta day 

2021          10/07/2021          Inactive             

 

                    cyanocobalamin (vit B-12) 1,000 mcg/mL injection solution Rx

Norm: 202915      Take 

Milliliter(s) Injection 2021      Inactive         

 

                    cyanocobalamin (vit B-12) 1,000 mcg/mL injection solution Rx

Norm: 276532      Take 

Milliliter(s) Injection 2021      Inactive         

 

                          albuterol sulfate 2.5 mg/3 mL (0.083 %) solution for n

ebulization RxNorm: 097074

             1 Unit Dose Inhalation two times a day DX J43.1 2021   Inactive      

 

                losartan 50 mg tablet RxNorm: 798970  1 Tablet(s) Oral two times

 a day 2021          Active               

 

                          albuterol sulfate 2.5 mg/3 mL (0.083 %) solution for n

ebulization RxNorm: 634053

             1 Unit Dose Inhalation two times a day DX J43.1 2021   Inactive      

 

                          albuterol sulfate 2.5 mg/3 mL (0.083 %) solution for n

ebulization RxNorm: 506727

             1 Unit Dose Inhalation two times a day 2021   In

active      

 

                          albuterol sulfate 2.5 mg/3 mL (0.083 %) solution for n

ebulization RxNorm: 602798

             1 Unit Dose Inhalation two times a day 2021   In

active      

 

             Norvasc 5 mg tablet RxNorm: 476920 1 Tablet(s) Oral every day 2022                Active                     

 

             Norvasc 5 mg tablet RxNorm: 563449 1 Tablet(s) Oral every day 2021                Inactive                   

 

                    metoprolol succinate ER 25 mg tablet,extended release 24 hr 

RxNorm: 097541      1 

Tablet(s) Oral every day 2021      No Stop Date    Active           

 

                    Ventolin HFA 90 mcg/actuation aerosol inhaler RxNorm: 256454

      1-2 Puff(s) 

Inhalation Every 4 hrs as needed 2021      No Stop Date    Active         

  

 

             furosemide 20 mg tablet RxNorm: 446064 1 Tablet(s) Oral every day 0

2021   No 

Stop Date                 Active                     

 

                    furosemide 40 mg tablet RxNorm: 279393      1 Tablet(s) Oral

 as directed 40mg BID x 5

days then 40mg in am and 20mg afternoon thereafter 2020          

Inactive                                give qty sufficient

 

                    potassium chloride ER 10 mEq tablet,extended release RxNorm:

 911407      1 Tablet(s) 

Oral as directed 1 tab bid x 5 days then resume daily 2020          

Inactive                                qty sufficient

 

                    potassium bicarbonate-citric acid 10 mEq effervescent tablet

 RxNorm: 3654068     1 

Tablet(s) Oral every day 10/15/2020      2020      Inactive         

 

                levothyroxine 125 mcg tablet RxNorm: 166118  1 Tablet(s) Oral ev

berta day 

10/15/2020          2021          Inactive             

 

                losartan 50 mg tablet RxNorm: 993536  1 Tablet(s) Oral two times

 a day 10/15/2020

                    2021          Inactive             

 

             furosemide 40 mg tablet RxNorm: 904774 1 Tablet(s) Oral every day 1

0/15/2020   

2020                Inactive                   

 

             Zyrtec 10 mg tablet RxNorm: 4330297 1 Tablet(s) Oral every day 10/0

2020   

2021                Inactive                   

 

                levothyroxine 125 mcg tablet RxNorm: 926256  1 Tablet(s) Oral ev

berta day 

10/05/2020          10/14/2020          Inactive             

 

             Zyrtec 10 mg tablet RxNorm: 7905820 1 Tablet(s) Oral every day 10/0

2020   

10/04/2020                Inactive                   

 

                aspirin 81 mg tablet,delayed release RxNorm: 357847  1 Tablet(s)

 Oral every day 

2020          No Stop Date        Active               

 

             Vitamin C 250 mg tablet RxNorm: 752175 1 Tablet(s) Oral every day 0

2020   No 

Stop Date                 Active                     

 

                Vitamin D3 50 mcg (2,000 unit) tablet RxNorm: 375375  1 Tablet(s

) Oral every day 

2020          No Stop Date        Active               

 

                levothyroxine 88 mcg tablet RxNorm: 161892  1 Tablet(s) Oral ledy

ry day 2020

                    10/04/2020          Inactive             

 

             losartan 50 mg tablet RxNorm: 917306 1 Tablet(s) Oral every day 09/

   

10/14/2020                Inactive                   

 

             furosemide 40 mg tablet RxNorm: 847670 1 Tablet(s) Oral every day 0

2020   

10/14/2020                Inactive                   

 

                    potassium bicarbonate-citric acid 10 mEq effervescent tablet

 RxNorm: 0807037     1 

Tablet(s) Oral every day 2020      10/14/2020      Inactive         

 

          Women's Multivitamin oral RxNorm:   oral      2020           Act

maryjo     

 

          Calcium 600 oral RxNorm: 1897 oral      2020           Active   

  

 

          albuterol sulfate inhalation RxNorm: 422708 inhalation 2020     

      Active     







Medication Administered





             Medication   Codes        Instructions Start Date   Status

 

                    cyanocobalamin (vit B-12) 1,000 mcg/mL injection solution Rx

Norm: 999519      

Milliliter                2021                No longer Active

 

                    cyanocobalamin (vit B-12) 1,000 mcg/mL injection solution Rx

Norm: 771490      

Milliliter                2021                No longer Active







Immunizations





             Vaccine      Codes        Dose         Date         Status

 

             Covid-19     CVX: 207                  04/10/2021    

 

             Covid-19     CVX: 207                  2021    

 

             Pneumococcal (Adult) Unknown                   2019    

 

             Zoster       CVX: 121                  2017    







Results





          Observation Observation Code Item      Item Code Result    Date      S

ervice Location

 

          Cbc With Differential Ord2      WBC                 8.64 K/ul 20

21 Unknown

 

          Cbc With Differential Ord2      RBC                 3.76 M/ul 20

21 Unknown

 

          Cbc With Differential Ord2      HGB                 9.9 g/dl  20

21 Unknown

 

          Cbc With Differential Ord2      Neut%               61.9 %    20

21 Unknown

 

          Cbc With Differential Ord2      HCT                 33.4 %    20

21 Unknown

 

          Cbc With Differential Ord2      MCV                 88.8 fl   20

21 Unknown

 

          Cbc With Differential Ord2      Lymph%              28.1 %    20

21 Unknown

 

          Cbc With Differential Ord2      Mono%               8.0 %     20

21 Unknown

 

          Cbc With Differential Ord2      MCH                 26.3 pg   20

21 Unknown

 

          Cbc With Differential Ord2      MCHC                29.6 pg   20

21 Unknown

 

          Cbc With Differential Ord2      Eos%                1.9 %     20

21 Unknown

 

          Cbc With Differential Ord2      Baso%               0.1 %     20

21 Unknown

 

          Cbc With Differential Ord2      PLT                 332 K/ul  20

21 Unknown

 

          Cbc With Differential Ord2      RDW                 15.2 %    20

21 Unknown

 

          Cbc With Differential Ord2      Neut ABS#           5.35 K/ul 20

21 Unknown

 

          Cbc With Differential Ord2      Lymph ABS#           2.43 K/ul 

021 Unknown

 

          Cbc With Differential Ord2      Mono ABS#           0.7 K/ul  20

21 Unknown

 

          Cbc With Differential Ord2      Eos ABS#            0.2 K/ul  20

21 Unknown

 

          Cbc With Differential Ord2      Baso ABS#           0.0 K/ul  20

21 Unknown

 

          Tsh       Ord6      TSH (3rd IS)           0.21 uIU/mL 2021 Unkn

own

 

          Comp Metabolic Rfi448    NA                  141 mEq/L 2021 Unkn

own

 

          Comp Metabolic Gdc446    K                   4.1 mEq/L 2021 Unkn

own

 

          Comp Metabolic Xsa159    CL                  104 mEq/L 2021 Unkn

own

 

          Comp Metabolic Sbl011    CO2                 28.0 mEq/L 2021 Unk

nown

 

          Comp Metabolic Wub404    ANION GAP           13        2021 Unkn

own

 

          Comp Metabolic Ulq692    GLUCOSE             78 mg/dL  2021 Unkn

own

 

          Comp Metabolic Zqx603    Creat               0.8 mg/dL 2021 Unkn

own

 

          Comp Metabolic Hgo413    eGFR                71 ml/min/1.73m2 20

21 Unknown

 

          Comp Metabolic Fqz897    BUN                 25 mg/dL  2021 Unkn

own

 

          Comp Metabolic Eni556    B/C Ratio           30.9 Ratio 2021 Unk

nown

 

          Comp Metabolic Ciz745    CALCIUM             8.7 mg/dL 2021 Unkn

own

 

          Comp Metabolic Elw752    ALK PHOS            61 U/L    2021 Unkn

own

 

          Comp Metabolic Hvh631    AST(SGOT)           17 U/L    2021 Unkn

own

 

          Comp Metabolic Fzl711    ALT(SGPT)           10 U/L    2021 Unkn

own

 

          Comp Metabolic Fgk377    BILI T              0.6 mg/dL 2021 Unkn

own

 

          Comp Metabolic Sgk927    ALBUMIN             3.7 g/dL  2021 Unkn

own

 

          Comp Metabolic Bys878    TPRO                6.1 g/dL  2021 Unkn

own

 

          Comp Metabolic Pik757    GLOB                2.4 g/dL  2021 Unkn

own

 

          Comp Metabolic Qoc143    A/G Ratio           1.6 Ratio 2021 Unkn

own

 

          Comp Metabolic Jpn372    Osmo                285 mOsmo 2021 Unkn

own

 

          B12       Euo633    B12                 >1500.00 pg/ml 2021 Unkn

own

 

          Free T4   Wjf146    FREE T4             1.23 ng/dL 2021 Unknown

 

          Tibc      Ord40     Iron                32 ug/dl  2021 Unknown

 

          Tibc      Ord40     UIBC                402 ug/dL 2021 Unknown

 

          Tibc      Ord40     TIBC                434 ug/dL 2021 Unknown

 

          Tibc      Ord40     Fe-%Sat             7.4 %     2021 Unknown

 

          Ferritin  Ord22     FERRITIN            27.6 ng/mL 2021 Unknown

 

          B12       Jtd332    B12                 187.00 pg/ml 2021 Unknow

n

 

          Comp Metabolic Uvw711    NA                  139 mEq/L 2021 Unkn

own

 

          Comp Metabolic Iyy898    K                   4.1 mEq/L 2021 Unkn

own

 

          Comp Metabolic Lcj065    CL                  103 mEq/L 2021 Unkn

own

 

          Comp Metabolic Olb008    CO2                 27.0 mEq/L 2021 Unk

nown

 

          Comp Metabolic Ubk657    ANION GAP           13        2021 Unkn

own

 

          Comp Metabolic Lvn790    GLUCOSE             79 mg/dL  2021 Unkn

own

 

          Comp Metabolic Nui196    Creat               0.7 mg/dL 2021 Unkn

own

 

          Comp Metabolic Maa777    eGFR                80 ml/min/1.73m2 20

21 Unknown

 

          Comp Metabolic Odi251    BUN                 21 mg/dL  2021 Unkn

own

 

          Comp Metabolic Wbc377    B/C Ratio           28.8 Ratio 2021 Unk

nown

 

          Comp Metabolic Zah383    CALCIUM             8.7 mg/dL 2021 Unkn

own

 

          Comp Metabolic Sft989    ALK PHOS            74 U/L    2021 Unkn

own

 

          Comp Metabolic Fnt985    AST(SGOT)           15 U/L    2021 Unkn

own

 

          Comp Metabolic Gdf617    ALT(SGPT)           17 U/L    2021 Unkn

own

 

          Comp Metabolic Dps508    BILI T              0.4 mg/dL 2021 Unkn

own

 

          Comp Metabolic Arp387    ALBUMIN             3.5 g/dL  2021 Unkn

own

 

          Comp Metabolic Pbf510    TPRO                5.9 g/dL  2021 Unkn

own

 

          Comp Metabolic Ibl324    GLOB                2.4 g/dL  2021 Unkn

own

 

          Comp Metabolic Ceu408    A/G Ratio           1.4 Ratio 2021 Unkn

own

 

          Comp Metabolic Whn913    Osmo                279 mOsmo 2021 Unkn

own

 

          Cbc With Differential Ord2      WBC                 7.63 K/ul 20

21 Unknown

 

          Cbc With Differential Ord2      RBC                 3.38 M/ul 20

21 Unknown

 

          Cbc With Differential Ord2      HGB                 9.4 g/dl  20

21 Unknown

 

          Cbc With Differential Ord2      Neut%               52.6 %    20

21 Unknown

 

          Cbc With Differential Ord2      HCT                 31.5 %    20

21 Unknown

 

          Cbc With Differential Ord2      MCV                 93.2 fl   20

21 Unknown

 

          Cbc With Differential Ord2      Lymph%              33.8 %    20

21 Unknown

 

          Cbc With Differential Ord2      MCH                 27.8 pg   20

21 Unknown

 

          Cbc With Differential Ord2      Mono%               7.9 %     20

21 Unknown

 

          Cbc With Differential Ord2      MCHC                29.8 pg   20

21 Unknown

 

          Cbc With Differential Ord2      Eos%                5.4 %     20

21 Unknown

 

          Cbc With Differential Ord2      Baso%               0.3 %     20

21 Unknown

 

          Cbc With Differential Ord2      PLT                 329 K/ul  20

21 Unknown

 

          Cbc With Differential Ord2      Neut ABS#           4.02 K/ul 20

21 Unknown

 

          Cbc With Differential Ord2      RDW                 15.2 %    20

21 Unknown

 

          Cbc With Differential Ord2      Lymph ABS#           2.58 K/ul 

021 Unknown

 

          Cbc With Differential Ord2      Mono ABS#           0.6 K/ul  20

21 Unknown

 

          Cbc With Differential Ord2      Eos ABS#            0.4 K/ul  20

21 Unknown

 

          Cbc With Differential Ord2      Baso ABS#           0.0 K/ul  20

21 Unknown







Procedures





                    Procedure           Codes               Date

 

                    THER/PROPH/DIAG INJ SC/IM CPT-4: 69441        2021

 

                    VITAMIN B12 INJECTION 1000 mcg CPT-4:         

 

                    THER/PROPH/DIAG INJ SC/IM CPT-4: 50020        2021







Vital Signs





                          Date                      Vital

 

                2021      SpO2: 96%       SpO2: 94%       SpO2: 87%

 

                2021      Blood Pressure 1: 138/82 Code: 8480-6 BMI: 33.5 

Code: 32093-9 Heart 

Rate 1: 74 bpm      Height: 5'3" Code: 8302-2 SpO2: 98%           Temperature: 3

6.2 (C) / 97.1 

(F)                                     Weight: 189 lbs  Code: 67400-3

 

                06/15/2021      Blood Pressure 1: 134/80 Code: 8480-6 Heart Rate

 1: 77 bpm Height: 

5'3" Code: 8302-2         SpO2: 93%                 Temperature: 36.3 (C) / 97.3

 (F)

 

                2021      Blood Pressure 1: 136/88 Code: 8480-6 Heart Rate

 1: 60 bpm Height:  

Code: 8302-2        SpO2: 93%           Temperature: 36.3 (C) / 97.3 (F) Weight:

 174 lbs  Code: 

41321-5

 

                2021      Blood Pressure 1: 164/92 Code: 8480-6 BMI: 31.4 

Code: 30177-6 Heart 

Rate 1: 67 bpm      Height: 5'3" Code: 8302-2 SpO2: 94%           Temperature: 3

6.2 (C) / 97.1 

(F)                                     Weight: 177 lbs  Code: 20075-6

 

                2021      Blood Pressure 1: 134/72 Code: 8480-6 BMI: 30.8 

Code: 49312-4 Heart 

Rate 1: 74 bpm  Height: 5'3" Code: 8302-2 Respiratory Rate: 18 bpm SpO2: 95%    

   

Temperature: 36.3 (C) / 97.4 (F)        Weight: 174 lbs  Code: 53214-6

 

                10/15/2020      Blood Pressure 1: 202/94 Code: 8480-6 BMI: 30.6 

Code: 41037-1 Heart 

Rate 1: 73 bpm  Height: 5'3" Code: 8302-2 Respiratory Rate: 17 bpm SpO2: 91%    

   

Temperature: 36.8 (C) / 98.2 (F)        Weight: 173 lbs  Code: 39332-7

 

                2020      Blood Pressure 1: 144/90 Code: 8480-6 BMI: 29.4 

Code: 87660-7 Heart 

Rate 1: 72 bpm      Height: 5'3" Code: 8302-2 SpO2: 98%           Temperature: 3

6.7 (C) / 98.0 

(F)                                     Weight: 166 lbs  Code: 35965-3







Functional Status

No Functional Status data



Reason For Visit





                    Reason For Visit    Effective Dates     Notes

 

                    hypertension        2021           

 

                    hypertension        06/15/2021           

 

                    hypertension        2021           

 

                    Hospital Follow Up  2021           

 

                    shortness of breath 2021           

 

                    shortness of breath 10/15/2020           

 

                    Hospital Follow Up  2020           







Encounters





             Encounter    Performer    Location     Codes        Date

 

                                        [id number] EST. PATIENT, LEVEL I

Diagnosis: CHF (congestive heart failure)[ICD10: I50.9]

Diagnosis: On supplemental oxygen therapy[ICD10: Z99.81]

Diagnosis: Panlobular emphysema[ICD10: J43.1] Kimberly rhodes MD, 

[facility]                       CPT-4: 01658              2021

 

                                        [id number]) [id number] EST. PATIENT, LEVEL IV

Diagnosis: Vitamin B12 deficiency (dietary) anemia[ICD10: D51.8]

Diagnosis: Essential (primary) hypertension[ICD10: I10]

Diagnosis: Atrophy of thyroid (acquired)[ICD10: E03.4] Franca Souza MD, [facility]          CPT-4: 27474              2021

 

                                        [id number]) [id number] EST. PATIENT, LEVEL IV

Diagnosis: Essential (primary) hypertension[ICD10: I10]

Diagnosis: Vitamin B12 deficiency (dietary) anemia[ICD10: D51.8]

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

Diagnosis: Localized edema[ICD10: R60.0] Franca mejia MD, [facility]

                          CPT-4: 77964              06/15/2021

 

                                        [id number]) [id number] EST. PATIENT, LEVEL IV

Diagnosis: CHF (congestive heart failure)[ICD10: I50.9]

Diagnosis: Essential (primary) hypertension[ICD10: I10]

Diagnosis: Vitamin B12 deficiency (dietary) anemia[ICD10: D51.8] Franca Souza MD, [facility] CPT-4: 79591        2021

 

                                        [phone]) [phone] EST. PATIENT, LEVEL IV

Diagnosis: Acute on chronic diastolic congestive heart failure[ICD10: I50.33]

Diagnosis: Anemia[ICD10: D64.9]

Diagnosis: Essential (primary) hypertension[ICD10: I10] Franca Souza MD, [facility]          CPT-4: 29344              2021

 

                                        [phone]) [id number] EST. PATIENT, LEVEL IV

Diagnosis: Essential (primary) hypertension[ICD10: I10]

Diagnosis: CHF (congestive heart failure)[ICD10: I50.9]

Diagnosis: Atrophy of thyroid (acquired)[ICD10: E03.4] Kimberly Souza MD, [facility]          CPT-4: 17784              2021

 

                                        [phone] EST. PATIENT, LEVEL IV

Diagnosis: Essential (primary) hypertension[ICD10: I10]

Diagnosis: CHF (congestive heart failure)[ICD10: I50.9]

Diagnosis: On supplemental oxygen therapy[ICD10: Z99.81]

Diagnosis: Atrophy of thyroid (acquired)[ICD10: E03.4] Kimberly Souza MD, [facility]          CPT-4: 36959              10/15/2020

 

                                        OFFICE  VISIT, NEW - LEVEL 3

Diagnosis: CHF (congestive heart failure)[ICD10: I50.9]

Diagnosis: Dementia without behavioral disturbance, unspecified dementia 
type[ICD10: F03.90] [facility] CPT-4: 90790    







Plan of Care





             Planned Activity Notes        Codes        Status       Date

 

             Patient Education: Patient Medication Summary                      

     Completed    10/25/2021

 

             Appointment:                            Nurse Visit  2021

 

             Patient Education: Patient Medication Summary                      

     Completed    2021

 

                          Visit Plan:               Hypertension - well controll

ed - continue with current medications,

continue with no added salt diet. Pt has been encouraged to exercise daily. The 
pt has been advised to call the office if there are any acute concerns about 
change in blood pressure readings at home. Hypothyroidism - pt with chronic 
hypothyroidism, continue with current medication, will monitor pt for signs or 
symptoms of lack of adequate supplementation. Pt is to continue with current 
dose of medication unless directed otherwise. Check labs at regular intervals q 
3 months or q 6 months based on previous levels of control. B12 def -injection 
today

                                                            2021

 

                                        Appointment: Franca Urbina 

WPtel:[phone]

                                        [facility]

                                              (30 min) Complex 2021

 

             Patient Education: Patient Medication Summary                      

     Completed    2021

 

             Appointment:                            Injection    2021

 

             Patient Education: Patient Medication Summary                      

     Completed    2021

 

             Patient Education: Patient Medication Summary                      

     Completed    2021

 

             Care Plan: Cbc With Differential                           Pending 

     2021

 

             Care Plan: Comp Metabolic                           Pending      

 

             Care Plan: Tsh                           Pending      2021

 

             Care Plan: Magnesium                           Pending      

021

 

             Care Plan: Free T4                           Pending      

 

                          Visit Plan:               Hypertension - well controll

ed - continue with current medications,

continue with no added salt diet. Pt has been encouraged to exercise daily. The 
pt has been advised to call the office if there are any acute concerns about 
change in blood pressure readings at home. Anemia- recheck labs -continue b12 
injections Edema - pt has been advised to elevate legs to prevent dependent 
edema, compression has been recommended to help to naturally decrease peripheral
edema. Diuretic use has been discussed and pt has been instructed in appropriate
use of such medication as necessary to further attempt to reduce peripheral 
edema. Hypothyroidism -check levels with next labs

                                                            06/15/2021

 

                                        Appointment: Franca Urbina 

WPtel:[phone]

                                        [address] [facility]

[place]                                              (30 min) Complex 06/15/2021

 

             Patient Education: Patient Medication Summary                      

     Completed    06/15/2021

 

                                        Appointment: Franca Urbina 

WPtel:[phone]

                                        [address] [facility]

[place]                                              (30 min) Complex 2021

 

                                        Appointment: LibbyKimberly 

WPtel:[phone]

                                        [address] [facility]

[place]                                              (15 min) Moderate 04/15/2021

 

                          Visit Plan:               Hypertension - well controll

ed - continue with current medications,

continue with no added salt diet. Pt has been encouraged to exercise daily. The 
pt has been advised to call the office if there are any acute concerns about 
change in blood pressure readings at home. CHF - congestive heart failure - Pt 
has Chronic congestive heart failure - and is currently fairly well maintained 
on the current medications. Today there is no change in the treatment course. If
symptoms worsen, increase edema or more that 2-3 pound weight gain over a week 
without improvement in the symptoms with a decrease in the sodium of the diet, 
then the pt is to have the office alerted. Anemia- continue with B12 injections 
per HH

                                                            2021

 

                                        Appointment: Franca Urbina 

WPtel:[phone]

                                        [address] [facility]

[place]                                              (30 min) Complex 2021

 

             Patient Education: Patient Medication Summary                      

     Completed    2021

 

                          Visit Plan:               Acute on chronic CHF - patie

nt refuses cardiology -continue with 

oxygen at all times- patient did not wear oxygen to appt- patient and sister 
verbalized understanding of plan. Did recommend follow up with cardiology due to
recurrent hospitalizations for CHF and patient continues to refuse Anemia- 
repeat labs today HTN -controlled- no change in medications today

                                                            2021

 

                                        Appointment: Franca Urbina 

WPtel:[phone]

                                        [address] [facility]

[place]                                              (30 min) Complex 2021

 

             Patient Education: Patient Medication Summary                      

     Completed    2021

 

             Patient Education: Patient Medication Summary                      

     Completed    2021

 

                          Visit Plan:               Chronic Congestive heart sheryl

lure - symptoms stable on lasix -pt has

refused referral to Cardiology - she is not interested in any other physician 
and will not go to another specialist per her report - pt to continue with 
chronic oxygen therapy. Hypertension - well controlled - continue with current 
medications, continue with no added salt diet. Pt has been encouraged to 
exercise daily. The pt has been advised to call the office if there are any 
acute concerns about change in blood pressure readings at home. Hypothyroidism -
pt with chronic hypothyroidism, continue with current medication, will monitor 
pt for signs or symptoms of lack of adequate supplementation. Pt is to continue 
with current dose of medication unless directed otherwise. Check labs at regular
intervals q 3 months or q 6 months based on previous levels of control.

                                                            2021

 

             Patient Education: Patient Medication Summary                      

     Completed    2021

 

                                        Appointment: Kimberly Souza 

WPtel:[phone]

                                        [address] [place], [place] [address]

                                              (30 min) Complex 2020

 

                          Visit Plan:               Chronic Congestive heart sheryl

lure - symptoms stable on lasix - need 

to initiate referral to Dr. Mendoza - if not already done - pt to continue with 
chronic oxygen therapy - due to her weakness and need for easier to transport of
the oxygen from place to place, I have recommended that she needs a portable 
oxygen concentrator. She is too weak to transport large oxygen bottles from 
place to place and is too weak to move them up the stairs in her home. She has 
tripped over her oxygen tubing from the large oxygen concentrator in her house, 
therefore the extra long tubing is not safe to have in her home. She also has 
trouble getting the oxygen tanks moved from house to vehicles and around places 
if she goes shopping. Hypertension - uncontrolled - the patient's medications 
have been modified as documented in the visit note. The patient has been 
counseled to cut back on salt in diet for a no added salt diet, low fat diet, 
start an exercise program with low weight bearing exercises and higher aerobic 
activity for heart health. The patient is to check blood pressure readings as an
outpatient and either fax, call, or email the readings to the office next week 
for practitioner to review. The pt is to call for acute concerns. Increase 
losartan from 50mg daily to 50mg twice daily. Hypothyroidism - pt with chronic 
hypothyroidism, continue with current medication, will monitor pt for signs or 
symptoms of lack of adequate supplementation. Pt is to continue with current 
dose of medication unless directed otherwise. Check labs at regular intervals q 
3 months or q 6 months based on previous levels of control. Fasting labs to be 
done to be drawn by home health.

                                                            10/15/2020

 

                                        Appointment: Kimberly Souza 

WPtel:[phone]

                                        [facility]

[place]                                              (15 min) Moderate 10/15/2020

 

             Patient Education: Patient Medication Summary                      

     Completed    10/15/2020

 

                          Visit Plan:               CHF - congestive heart failu

re - Pt has Chronic congestive heart 

failure - and is currently fairly well maintained on the current medications. 
Today there is no change in the treatment course. If symptoms worsen, increase 
edema or more that 2-3 pound weight gain over a week without improvement in the 
symptoms with a decrease in the sodium of the diet, then the pt is to have the 
office alerted. Dementia - Pt with slowly progressive pattern. I have discussed 
with pt and family the prognosis of this disease state and the need for the 
family to anticipate further decline with behavior changes. Continue with 
current plan of treatment.

                                                            2020

 

             Patient Education: Patient Medication Summary                      

     Completed    2020







Instructions





                          Comment                   Date

 

                                        . Hypertension - well controlled - pop

nue with current medications, continue 

with no added salt diet.  Pt has been encouraged to exercise daily.

The pt has been advised to call the office if there are any acute concerns about
change in blood pressure readings at home.



Hypothyroidism - pt with chronic hypothyroidism, continue with current 
medication, will monitor pt for signs or symptoms of lack of adequate 
supplementation.  Pt is to continue with current dose of medication unless 
directed otherwise.  Check labs at regular intervals q 3 months or q 6 months 
based on previous levels of control.



B12 def -injection today 

                                        2021

 

                                        HAVE HOME HEALTH DRAW LABS WHEN THEY DO 

NEXT B12 INJECTION - CBC, CMP, TSH, FREE

T4, BNP, MAGNESIUM . Hypertension - well controlled - continue with current 
medications, continue with no added salt diet.  Pt has been encouraged to 
exercise daily.

The pt has been advised to call the office if there are any acute concerns about
change in blood pressure readings at home.



Anemia- recheck labs -continue b12 injections 



Edema - pt has been advised to elevate legs to prevent dependent edema, 
compression has been recommended to help to naturally decrease peripheral edema.
 Diuretic use has been discussed and pt has been instructed in appropriate use 
of such medication as necessary to further attempt to reduce peripheral edema.



Hypothyroidism -check levels with next labs  06/15/2021

 

                                        CONTINUE LOW SODIUM DIET



CONTINUE HOME HEALTH WITH [facility] B12 INJECTIONS



FOLLOW UP WITH DR OVALLE FOR PFTS AND CT SCAN

                                        . Hypertension - well controlled - pop

nue with current medications, continue 

with no added salt diet.  Pt has been encouraged to exercise daily.

The pt has been advised to call the office if there are any acute concerns about
change in blood pressure readings at home.



CHF - congestive heart failure - Pt has Chronic congestive heart failure - and 
is currently fairly well maintained on the current medications.  Today there is 
no change in the treatment course.  If symptoms worsen, increase edema or more 
that 2-3 pound weight gain over a week without improvement in the symptoms with 
a decrease in the sodium of the diet, then the pt is to have the office alerted.



Anemia- continue with B12 injections per  

                                        2021

 

                                        . Acute on chronic CHF - patient refuses

 cardiology -continue with oxygen at all

times- patient did not wear oxygen to appt- patient and sister verbalized 
understanding of plan. Did recommend follow up with cardiology due to recurrent 
hospitalizations for CHF and patient continues to refuse 



Anemia- repeat labs today 



HTN -controlled- no change in medications today  2021

 

                                        . Chronic Congestive heart failure - sym

ptoms stable on lasix  -pt has refused 

referral to Cardiology - she is not interested in any other physician and will 
not go to another specialist per her report - pt to continue with chronic oxygen
therapy.



Hypertension - well controlled - continue with current medications, continue 
with no added salt diet.  Pt has been encouraged to exercise daily.

The pt has been advised to call the office if there are any acute concerns about
change in blood pressure readings at home.



Hypothyroidism - pt with chronic hypothyroidism, continue with current 
medication, will monitor pt for signs or symptoms of lack of adequate 
supplementation.  Pt is to continue with current dose of medication unless 
directed otherwise.  Check labs at regular intervals q 3 months or q 6 months 
based on previous levels of control.

                                        2021

 

                                        . Chronic Congestive heart failure - sym

ptoms stable on lasix  - need to 

initiate referral to Dr. Mendoza - if not already done - pt to continue with 
chronic oxygen therapy - due to her weakness and need for easier to transport of
the oxygen from place to place, I have recommended that she needs a portable 
oxygen concentrator.  She is too weak to transport large oxygen bottles from 
place to place and is too weak to move them up the stairs in her home.  She has 
tripped over her oxygen tubing from the large oxygen concentrator in her house, 
therefore the extra long tubing is not safe to have in her home.  She also has 
trouble getting the oxygen tanks moved from house to vehicles and around places 
if she goes shopping.



Hypertension - uncontrolled - the patient's medications have been modified as 
documented in the visit note.  The patient has been counseled to cut back on 
salt in diet for a no added salt diet, low fat diet, start an exercise program 
with low weight bearing exercises and higher aerobic activity for heart health. 


The patient is to check blood pressure readings as an outpatient and either fax,
call, or email the readings to the office next week for practitioner to review.

The pt is to call for acute concerns.

Increase losartan from 50mg daily to 50mg twice daily.



Hypothyroidism - pt with chronic hypothyroidism, continue with current 
medication, will monitor pt for signs or symptoms of lack of adequate 
supplementation.  Pt is to continue with current dose of medication unless 
directed otherwise.  Check labs at regular intervals q 3 months or q 6 months 
based on previous levels of control.



Fasting labs to be done to be drawn by [facility]. 10/15/2020

 

                                        Will set up with [facility]



pt is to consider moving to assisted living



pt is to get a life alert bracelet. CHF - congestive heart failure - Pt has 
Chronic congestive heart failure - and is currently fairly well maintained on 
the current medications.  Today there is no change in the treatment course.  If 
symptoms worsen, increase edema or more that 2-3 pound weight gain over a week 
without improvement in the symptoms with a decrease in the sodium of the diet, 
then the pt is to have the office alerted.



Dementia - Pt with slowly progressive pattern.  I have discussed with pt and 
family the prognosis of this disease state and the need for the family to 
anticipate further decline with behavior changes.  Continue with current plan of
treatment.

                                        2020







Medical Equipment

No Medical Equipment data



Health Concerns Section

Health Concerns data not found



Goals Section

Goals data not found



Interventions Section

Interventions data not found



Health Status Evaluations/Outcomes Section

Health Status Evaluations/Outcomes data not found



Advance Directives

No Advance Directive data

## 2022-01-03 NOTE — XMS REPORT
CCD document using C-CDA

                             Created on: 2021



Tierra Rizvi

External Reference #: [id number]

: 1934

Sex: Female



Demographics





                          Address                   [address]  [place]  [address]

 

                          Home Phone                [phone]

 

                          Preferred Language        Unknown

 

                          Marital Status            Unknown

 

                          Denominational Affiliation     Unknown

 

                          Race                      White

 

                          Ethnic Group              Not  or 





Author





                          Author                    Tierra Moreno

 

                          Organization              Kimberly Souza MD, [facility]

 

                          Address                   [address] [place]  [address]



 

                          Phone                     [phone]







Care Team Providers





                    Care Team Member Name Role                Phone

 

                    Kimberly Souza     PP                  Unavailable

 

                          CCM                       Unavailable







Summary Purpose

Interface Exchange



Insurance Providers





             Payer name   Policy type / Coverage type Covered party ID Effective

 Begin Date 

Effective End Date

 

             [employer] Medicare Part B Medicare Part B [id number]  Unknown      Unkno

wn

 

             [facility] Medicare Part B [id number] Unkno

wn      Unknown







Family history





Sister



                          Diagnosis                 Age At Onset

 

                          Arthritis                 Unknown

 

                          Hypertension              Unknown

 

                          Anemia                    Unknown

 

                          Diabetes mellitus Type 2  Unknown

 

                          Osteoporosis              Unknown







Son



                          Diagnosis                 Age At Onset

 

                          Myocardial infarction     Unknown

 

                          Hypertension              Unknown

 

                          Diabetes mellitus Type 2  Unknown







Father



                          Diagnosis                 Age At Onset

 

                          Cancer                    Unknown

 

                          Alcoholism                Unknown

 

                          kidney disease            Unknown







Mother



                          Diagnosis                 Age At Onset

 

                          Cancer                    Unknown







Brother



                          Diagnosis                 Age At Onset

 

                          Hypertension              Unknown

 

                          Cancer                    Unknown

 

                          Alcoholism                Unknown

 

                          Diabetes mellitus Type 2  Unknown

 

                          kidney disease            Unknown

 

                          Myocardial infarction     Unknown







Social History





                Social History Element Codes           Description     Effective

 Dates

 

                Marital status  Unknown                  2020

 

                Employment      Unknown         Retired         2020

 

                    Tobacco history     SNOMED CT: 8512621  Quit over 10 years a

go

                          2020

 

                Alcohol history SNOMED CT: 295580163 Never drinks alcohol 2020







Allergies, Adverse Reactions, Alerts





           Substance  Reaction   Codes      Entered Date Inactivated Date Status

 

           Penicillin rash,      Unknown    2020 No Inactive Date Active

 

             * OTHER REACTION - SEE ANSWER BOX Tetracycline- Rash Unknown      0

2020   No 

Inactive Date                           Active







Problems





                Condition       Codes           Effective Dates Condition Status

 

                spinal stenosis Unknown         10/25/2021      Active

 

                          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

ication ICD-10: M48.062

ICD-9: 724.03             10/25/2021                Active

 

                          CHF (congestive heart failure) ICD-10: I50.9

ICD-9: 428.0              2020                Active

 

                          On supplemental oxygen therapy ICD-10: Z99.81

ICD-9: V46.2              10/15/2020                Active

 

                          Panlobular emphysema      ICD-10: J43.1

ICD-9: 492.8              2021                Active

 

                          Atrophy of thyroid (acquired) ICD-10: E03.4

ICD-9: 244.8              10/15/2020                Active

 

                          Essential (primary) hypertension ICD-10: I10

ICD-9: 401.1              10/15/2020                Active

 

                          Vitamin B12 deficiency (dietary) anemia ICD-10: D51.8

ICD-9: 281.1              2021                Active

 

                          Localized edema           ICD-10: R60.0

ICD-9: 782.3              06/15/2021                Active

 

                          Acute on chronic diastolic congestive heart failure IC

D-10: I50.33

ICD-9: 428.33             2021                Active

 

                          Anemia                    ICD-10: D64.9

ICD-9: 285.9              2021                Active

 

                          Dementia without behavioral disturbance, unspecified d

ementia type ICD-10: 

F03.90

ICD-9: 294.20             2020                Active







Medications





          Medication Codes     Instructions Start Date Stop Date Status    Fill 

Instructions

 

                    cyanocobalamin (vit B-12) 1,000 mcg/mL injection solution Rx

Norm: 251845      1 

Milliliter(s) Injection monthly 2021      Active          

will need 

syringe/needle for monthly injection dx b12 deficiency

 

                    cyanocobalamin (vit B-12) 1,000 mcg/mL injection solution Rx

Norm: 469965      1 

Milliliter(s) Injection monthly 2021      Inactive        

will need 

syringe/needle for monthly  injection dx b12 deficiency

 

                levothyroxine 112 mcg tablet RxNorm: 732113  1 Tablet(s) Oral ev

berta day 

2021          2022          Active               

 

                    hydrocodone 5 mg-acetaminophen 325 mg tablet RxNorm: 197381 

     Take 1 Tablet(s) 

Oral three times a day as needed for pain 10/25/2021      No Stop Date    Active

           

 

                          albuterol sulfate 2.5 mg/3 mL (0.083 %) solution for n

ebulization RxNorm: 531080

             USE 1 VIAL IN NEBULIZER TWICE DAILY 10/19/2021   2021   Activ

e        

 

                          albuterol sulfate 2.5 mg/3 mL (0.083 %) solution for n

ebulization RxNorm: 045390

             USE 1 VIAL IN NEBULIZER TWICE DAILY 10/19/2021   10/19/2021   Inact

maryjo      

 

                levothyroxine 112 mcg tablet RxNorm: 531689  1 Tablet(s) Oral ev

berta day 

10/07/2021          10/07/2021          Inactive             

 

                levothyroxine 112 mcg tablet RxNorm: 133318  1 Tablet(s) Oral ev

berta day 

10/07/2021          10/07/2021          Inactive             

 

                levothyroxine 112 mcg tablet RxNorm: 361462  1 Tablet(s) Oral ev

berta day 

2021          10/07/2021          Inactive             

 

                    cyanocobalamin (vit B-12) 1,000 mcg/mL injection solution Rx

Norm: 990039      Take 

Milliliter(s) Injection 2021      Inactive         

 

                    cyanocobalamin (vit B-12) 1,000 mcg/mL injection solution Rx

Norm: 259083      Take 

Milliliter(s) Injection 2021      Inactive         

 

                          albuterol sulfate 2.5 mg/3 mL (0.083 %) solution for n

ebulization RxNorm: 823369

             1 Unit Dose Inhalation two times a day DX J43.1 2021   Inactive      

 

                losartan 50 mg tablet RxNorm: 091559  1 Tablet(s) Oral two times

 a day 2021          Active               

 

                          albuterol sulfate 2.5 mg/3 mL (0.083 %) solution for n

ebulization RxNorm: 193046

             1 Unit Dose Inhalation two times a day DX J43.1 2021   Inactive      

 

                          albuterol sulfate 2.5 mg/3 mL (0.083 %) solution for n

ebulization RxNorm: 775918

             1 Unit Dose Inhalation two times a day 2021   In

active      

 

                          albuterol sulfate 2.5 mg/3 mL (0.083 %) solution for n

ebulization RxNorm: 289541

             1 Unit Dose Inhalation two times a day 2021   In

active      

 

             Norvasc 5 mg tablet RxNorm: 015884 1 Tablet(s) Oral every day 2022                Active                     

 

             Norvasc 5 mg tablet RxNorm: 847153 1 Tablet(s) Oral every day 2021                Inactive                   

 

                    metoprolol succinate ER 25 mg tablet,extended release 24 hr 

RxNorm: 086639      1 

Tablet(s) Oral every day 2021      No Stop Date    Active           

 

                    Ventolin HFA 90 mcg/actuation aerosol inhaler RxNorm: 837720

      1-2 Puff(s) 

Inhalation Every 4 hrs as needed 2021      No Stop Date    Active         

  

 

             furosemide 20 mg tablet RxNorm: 378182 1 Tablet(s) Oral every day 0

2021   No 

Stop Date                 Active                     

 

                    furosemide 40 mg tablet RxNorm: 495922      1 Tablet(s) Oral

 as directed 40mg BID x 5

days then 40mg in am and 20mg afternoon thereafter 2020          

Inactive                                give qty sufficient

 

                    potassium chloride ER 10 mEq tablet,extended release RxNorm:

 334561      1 Tablet(s) 

Oral as directed 1 tab bid x 5 days then resume daily 2020          

Inactive                                qty sufficient

 

                    potassium bicarbonate-citric acid 10 mEq effervescent tablet

 RxNorm: 1911255     1 

Tablet(s) Oral every day 10/15/2020      2020      Inactive         

 

                levothyroxine 125 mcg tablet RxNorm: 017544  1 Tablet(s) Oral ev

berta day 

10/15/2020          2021          Inactive             

 

                losartan 50 mg tablet RxNorm: 979271  1 Tablet(s) Oral two times

 a day 10/15/2020

                    2021          Inactive             

 

             furosemide 40 mg tablet RxNorm: 443314 1 Tablet(s) Oral every day 1

0/15/2020   

2020                Inactive                   

 

             Zyrtec 10 mg tablet RxNorm: 6701642 1 Tablet(s) Oral every day 10/0

2020   

2021                Inactive                   

 

                levothyroxine 125 mcg tablet RxNorm: 327265  1 Tablet(s) Oral ev

berta day 

10/05/2020          10/14/2020          Inactive             

 

             Zyrtec 10 mg tablet RxNorm: 2689890 1 Tablet(s) Oral every day 10/0

2020   

10/04/2020                Inactive                   

 

                aspirin 81 mg tablet,delayed release RxNorm: 874593  1 Tablet(s)

 Oral every day 

2020          No Stop Date        Active               

 

             Vitamin C 250 mg tablet RxNorm: 202229 1 Tablet(s) Oral every day 0

2020   No 

Stop Date                 Active                     

 

                Vitamin D3 50 mcg (2,000 unit) tablet RxNorm: 757819  1 Tablet(s

) Oral every day 

2020          No Stop Date        Active               

 

                levothyroxine 88 mcg tablet RxNorm: 687558  1 Tablet(s) Oral ledy

ry day 2020

                    10/04/2020          Inactive             

 

             losartan 50 mg tablet RxNorm: 677790 1 Tablet(s) Oral every day 09/

   

10/14/2020                Inactive                   

 

             furosemide 40 mg tablet RxNorm: 495878 1 Tablet(s) Oral every day 0

2020   

10/14/2020                Inactive                   

 

                    potassium bicarbonate-citric acid 10 mEq effervescent tablet

 RxNorm: 9702231     1 

Tablet(s) Oral every day 2020      10/14/2020      Inactive         

 

          Women's Multivitamin oral RxNorm:   oral      2020           Act

maryjo     

 

          Calcium 600 oral RxNorm: 1897 oral      2020           Active   

  

 

          albuterol sulfate inhalation RxNorm: 028329 inhalation 2020     

      Active     







Medication Administered





             Medication   Codes        Instructions Start Date   Status

 

                    cyanocobalamin (vit B-12) 1,000 mcg/mL injection solution Rx

Norm: 077638      

Milliliter                2021                No longer Active

 

                    cyanocobalamin (vit B-12) 1,000 mcg/mL injection solution Rx

Norm: 108756      

Milliliter                2021                No longer Active







Immunizations





                Vaccine         Codes           Date            Status

 

                Covid-19        CVX: 207        04/10/2021       

 

                Covid-19        CVX: 207        2021       

 

                Pneumococcal (Adult) Unknown         2019       

 

                Zoster          CVX: 121        2017       







Results





          Observation Observation Code Item      Item Code Result    Date      S

[facility]

 

          Cbc With Differential Ord2      WBC                 8.64 K/ul 20

21 Unknown

 

          Cbc With Differential Ord2      RBC                 3.76 M/ul 20

21 Unknown

 

          Cbc With Differential Ord2      HGB                 9.9 g/dl  20

21 Unknown

 

          Cbc With Differential Ord2      Neut%               61.9 %    20

21 Unknown

 

          Cbc With Differential Ord2      HCT                 33.4 %    20

21 Unknown

 

          Cbc With Differential Ord2      MCV                 88.8 fl   20

21 Unknown

 

          Cbc With Differential Ord2      Lymph%              28.1 %    20

21 Unknown

 

          Cbc With Differential Ord2      Mono%               8.0 %     20

21 Unknown

 

          Cbc With Differential Ord2      MCH                 26.3 pg   20

21 Unknown

 

          Cbc With Differential Ord2      MCHC                29.6 pg   20

21 Unknown

 

          Cbc With Differential Ord2      Eos%                1.9 %     20

21 Unknown

 

          Cbc With Differential Ord2      PLT                 332 K/ul  20

21 Unknown

 

          Cbc With Differential Ord2      Baso%               0.1 %     20

21 Unknown

 

          Cbc With Differential Ord2      RDW                 15.2 %    20

21 Unknown

 

          Cbc With Differential Ord2      Neut ABS#           5.35 K/ul 20

21 Unknown

 

          Cbc With Differential Ord2      Lymph ABS#           2.43 K/ul 

021 Unknown

 

          Cbc With Differential Ord2      Mono ABS#           0.7 K/ul  20

21 Unknown

 

          Cbc With Differential Ord2      Eos ABS#            0.2 K/ul  20

21 Unknown

 

          Cbc With Differential Ord2      Baso ABS#           0.0 K/ul  20

21 Unknown

 

          Tsh       Ord6      TSH (3rd IS)           0.21 uIU/mL 2021 Unkn

own

 

          Comp Metabolic Dbe225    NA                  141 mEq/L 2021 Unkn

own

 

          Comp Metabolic Sxj670    K                   4.1 mEq/L 2021 Unkn

own

 

          Comp Metabolic Nut048    CL                  104 mEq/L 2021 Unkn

own

 

          Comp Metabolic Hyp403    CO2                 28.0 mEq/L 2021 Unk

nown

 

          Comp Metabolic Cua518    ANION GAP           13        2021 Unkn

own

 

          Comp Metabolic Fjh197    GLUCOSE             78 mg/dL  2021 Unkn

own

 

          Comp Metabolic Jtr528    Creat               0.8 mg/dL 2021 Unkn

own

 

          Comp Metabolic Wvl716    eGFR                71 ml/min/1.73m2 20

21 Unknown

 

          Comp Metabolic Weo761    BUN                 25 mg/dL  2021 Unkn

own

 

          Comp Metabolic Ejo780    B/C Ratio           30.9 Ratio 2021 Unk

nown

 

          Comp Metabolic Xbt965    CALCIUM             8.7 mg/dL 2021 Unkn

own

 

          Comp Metabolic Pie088    ALK PHOS            61 U/L    2021 Unkn

own

 

          Comp Metabolic Hpo484    AST(SGOT)           17 U/L    2021 Unkn

own

 

          Comp Metabolic Hjw253    ALT(SGPT)           10 U/L    2021 Unkn

own

 

          Comp Metabolic Hnx821    BILI T              0.6 mg/dL 2021 Unkn

own

 

          Comp Metabolic Kgk107    ALBUMIN             3.7 g/dL  2021 Unkn

own

 

          Comp Metabolic Jyq829    TPRO                6.1 g/dL  2021 Unkn

own

 

          Comp Metabolic Cbo001    GLOB                2.4 g/dL  2021 Unkn

own

 

          Comp Metabolic Cjp649    A/G Ratio           1.6 Ratio 2021 Unkn

own

 

          Comp Metabolic Qkr469    Osmo                285 mOsmo 2021 Unkn

own

 

          B12       Xwq114    B12                 >1500.00 pg/ml 2021 Unkn

own

 

          Free T4   Vtm509    FREE T4             1.23 ng/dL 2021 Unknown

 

          Tibc      Ord40     Iron                32 ug/dl  2021 Unknown

 

          Tibc      Ord40     UIBC                402 ug/dL 2021 Unknown

 

          Tibc      Ord40     TIBC                434 ug/dL 2021 Unknown

 

          Tibc      Ord40     Fe-%Sat             7.4 %     2021 Unknown

 

          Ferritin  Ord22     FERRITIN            27.6 ng/mL 2021 Unknown

 

          B12       Hwh908    B12                 187.00 pg/ml 2021 Unknow

n

 

          Comp Metabolic Kgm780    NA                  139 mEq/L 2021 Unkn

own

 

          Comp Metabolic Ybh964    K                   4.1 mEq/L 2021 Unkn

own

 

          Comp Metabolic Ney582    CL                  103 mEq/L 2021 Unkn

own

 

          Comp Metabolic Jdk443    CO2                 27.0 mEq/L 2021 Unk

nown

 

          Comp Metabolic Cjm320    ANION GAP           13        2021 Unkn

own

 

          Comp Metabolic Frg390    GLUCOSE             79 mg/dL  2021 Unkn

own

 

          Comp Metabolic Sww122    Creat               0.7 mg/dL 2021 Unkn

own

 

          Comp Metabolic Faw407    eGFR                80 ml/min/1.73m2 20

21 Unknown

 

          Comp Metabolic Wgr738    BUN                 21 mg/dL  2021 Unkn

own

 

          Comp Metabolic Wle008    B/C Ratio           28.8 Ratio 2021 Unk

nown

 

          Comp Metabolic Gfw716    CALCIUM             8.7 mg/dL 2021 Unkn

own

 

          Comp Metabolic Xen353    ALK PHOS            74 U/L    2021 Unkn

own

 

          Comp Metabolic Ija490    AST(SGOT)           15 U/L    2021 Unkn

own

 

          Comp Metabolic Qlx258    ALT(SGPT)           17 U/L    2021 Unkn

own

 

          Comp Metabolic Ljm536    BILI T              0.4 mg/dL 2021 Unkn

own

 

          Comp Metabolic Qng817    ALBUMIN             3.5 g/dL  2021 Unkn

own

 

          Comp Metabolic Gih110    TPRO                5.9 g/dL  2021 Unkn

own

 

          Comp Metabolic Ent158    GLOB                2.4 g/dL  2021 Unkn

own

 

          Comp Metabolic Ihp765    A/G Ratio           1.4 Ratio 2021 Unkn

own

 

          Comp Metabolic Dhl166    Osmo                279 mOsmo 2021 Unkn

own

 

          Cbc With Differential Ord2      WBC                 7.63 K/ul 20

21 Unknown

 

          Cbc With Differential Ord2      RBC                 3.38 M/ul 20

21 Unknown

 

          Cbc With Differential Ord2      HGB                 9.4 g/dl  20

21 Unknown

 

          Cbc With Differential Ord2      Neut%               52.6 %    20

21 Unknown

 

          Cbc With Differential Ord2      HCT                 31.5 %    20

21 Unknown

 

          Cbc With Differential Ord2      MCV                 93.2 fl   20

21 Unknown

 

          Cbc With Differential Ord2      Lymph%              33.8 %    20

21 Unknown

 

          Cbc With Differential Ord2      MCH                 27.8 pg   20

21 Unknown

 

          Cbc With Differential Ord2      Mono%               7.9 %     20

21 Unknown

 

          Cbc With Differential Ord2      MCHC                29.8 pg   20

21 Unknown

 

          Cbc With Differential Ord2      Eos%                5.4 %     20

21 Unknown

 

          Cbc With Differential Ord2      Baso%               0.3 %     20

21 Unknown

 

          Cbc With Differential Ord2      PLT                 329 K/ul  20

21 Unknown

 

          Cbc With Differential Ord2      Neut ABS#           4.02 K/ul 20

21 Unknown

 

          Cbc With Differential Ord2      RDW                 15.2 %    20

21 Unknown

 

          Cbc With Differential Ord2      Lymph ABS#           2.58 K/ul 

021 Unknown

 

          Cbc With Differential Ord2      Mono ABS#           0.6 K/ul  20

21 Unknown

 

          Cbc With Differential Ord2      Eos ABS#            0.4 K/ul  20

21 Unknown

 

          Cbc With Differential Ord2      Baso ABS#           0.0 K/ul  20

21 Unknown







Procedures





                    Procedure           Codes               Date

 

                    THER/PROPH/DIAG INJ SC/IM CPT-4: 58912        2021

 

                    VITAMIN B12 INJECTION 1000 mcg CPT-4:         

 

                    THER/PROPH/DIAG INJ SC/IM CPT-4: 98074        2021







Vital Signs





                          Date                      Vital

 

                2021      SpO2: 96%       SpO2: 87%       SpO2: 94%

 

                2021      Blood Pressure 1: 138/82 Code: 8480-6 BMI: 33.5 

Code: 25310-6 Heart 

Rate 1: 74 bpm      Height: 5'3" Code: 8302-2 SpO2: 98%           Temperature: 3

6.2 (C) / 97.1 

(F)                                     Weight: 189 lbs  Code: 38468-5

 

                06/15/2021      Blood Pressure 1: 134/80 Code: 8480-6 Heart Rate

 1: 77 bpm Height: 

5'3" Code: 8302-2         SpO2: 93%                 Temperature: 36.3 (C) / 97.3

 (F)

 

                2021      Blood Pressure 1: 136/88 Code: 8480-6 Heart Rate

 1: 60 bpm Height:  

Code: 8302-2        SpO2: 93%           Temperature: 36.3 (C) / 97.3 (F) Weight:

 174 lbs  Code: 

05040-8

 

                2021      Blood Pressure 1: 164/92 Code: 8480-6 BMI: 31.4 

Code: 27525-5 Heart 

Rate 1: 67 bpm      Height: 5'3" Code: 8302-2 SpO2: 94%           Temperature: 3

6.2 (C) / 97.1 

(F)                                     Weight: 177 lbs  Code: 06777-6

 

                2021      Blood Pressure 1: 134/72 Code: 8480-6 BMI: 30.8 

Code: 67352-9 Heart 

Rate 1: 74 bpm  Height: 5'3" Code: 8302-2 Respiratory Rate: 18 bpm SpO2: 95%    

   

Temperature: 36.3 (C) / 97.4 (F)        Weight: 174 lbs  Code: 65690-5

 

                10/15/2020      Blood Pressure 1: 202/94 Code: 8480-6 BMI: 30.6 

Code: 91400-1 Heart 

Rate 1: 73 bpm  Height: 5'3" Code: 8302-2 Respiratory Rate: 17 bpm SpO2: 91%    

   

Temperature: 36.8 (C) / 98.2 (F)        Weight: 173 lbs  Code: 21614-8

 

                2020      Blood Pressure 1: 144/90 Code: 8480-6 BMI: 29.4 

Code: 12537-3 Heart 

Rate 1: 72 bpm      Height: 5'3" Code: 8302-2 SpO2: 98%           Temperature: 3

6.7 (C) / 98.0 

(F)                                     Weight: 166 lbs  Code: 01271-4







Functional Status

No Functional Status data



Reason For Visit





                    Reason For Visit    Effective Dates     Notes

 

                    hypertension        2021           

 

                    hypertension        06/15/2021           

 

                    hypertension        2021           

 

                    Hospital Follow Up  2021           

 

                    shortness of breath 2021           

 

                    shortness of breath 10/15/2020           

 

                    Hospital Follow Up  2020           







Encounters





             Encounter    Performer    Location     Codes        Date

 

                                         EST. PATIENT, LEVEL I

Diagnosis: CHF (congestive heart failure)[ICD10: I50.9]

Diagnosis: On supplemental oxygen therapy[ICD10: Z99.81]

Diagnosis: Panlobular emphysema[ICD10: J43.1] Kimberly rhodes MD, 

[facility] [facility]                       CPT-4: 92380              2021

 

                                        [phone] EST. PATIENT, LEVEL IV

Diagnosis: Vitamin B12 deficiency (dietary) anemia[ICD10: D51.8]

Diagnosis: Essential (primary) hypertension[ICD10: I10]

Diagnosis: Atrophy of thyroid (acquired)[ICD10: E03.4] Franca Souza MD, [facility]          CPT-4: 54484              2021

 

                                        [phone] [id number] EST. PATIENT, LEVEL IV

Diagnosis: Essential (primary) hypertension[ICD10: I10]

Diagnosis: Vitamin B12 deficiency (dietary) anemia[ICD10: D51.8]

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

Diagnosis: Localized edema[ICD10: R60.0] Franac mejia MD, [facility]

                          CPT-4: 78623              06/15/2021

 

                                        [id number]) [phone] EST. PATIENT, LEVEL IV

Diagnosis: CHF (congestive heart failure)[ICD10: I50.9]

Diagnosis: Essential (primary) hypertension[ICD10: I10]

Diagnosis: Vitamin B12 deficiency (dietary) anemia[ICD10: D51.8] Franca Souza MD, [facility] CPT-4: 96955        2021

 

                                        [phone]) [id number] EST. PATIENT, LEVEL IV

Diagnosis: Acute on chronic diastolic congestive heart failure[ICD10: I50.33]

Diagnosis: Anemia[ICD10: D64.9]

Diagnosis: Essential (primary) hypertension[ICD10: I10] Franca Souza MD, [facility]          CPT-4: 19927              2021

 

                                        [id number]) [phone] EST. PATIENT, LEVEL IV

Diagnosis: Essential (primary) hypertension[ICD10: I10]

Diagnosis: CHF (congestive heart failure)[ICD10: I50.9]

Diagnosis: Atrophy of thyroid (acquired)[ICD10: E03.4] Kimberly Souza MD, [facility]          CPT-4: 78100              2021

 

                                        [phone]) [phone] EST. PATIENT, LEVEL IV

Diagnosis: Essential (primary) hypertension[ICD10: I10]

Diagnosis: CHF (congestive heart failure)[ICD10: I50.9]

Diagnosis: On supplemental oxygen therapy[ICD10: Z99.81]

Diagnosis: Atrophy of thyroid (acquired)[ICD10: E03.4] Kimberly Souza MD, [facility]          CPT-4: 80640              10/15/2020

 

                                        OFFICE  VISIT, NEW - LEVEL 3

Diagnosis: CHF (congestive heart failure)[ICD10: I50.9]

Diagnosis: Dementia without behavioral disturbance, unspecified dementia 
type[ICD10: F03.90] Lynn Souza MD, [facility] CPT-4: 48918    







Plan of Care





             Planned Activity Notes        Codes        Status       Date

 

             Patient Education: Patient Medication Summary                      

     Completed    10/25/2021

 

             Appointment:                            Nurse Visit  2021

 

             Patient Education: Patient Medication Summary                      

     Completed    2021

 

                          Visit Plan:               Hypertension - well controll

ed - continue with current medications,

continue with no added salt diet. Pt has been encouraged to exercise daily. The 
pt has been advised to call the office if there are any acute concerns about 
change in blood pressure readings at home. Hypothyroidism - pt with chronic 
hypothyroidism, continue with current medication, will monitor pt for signs or 
symptoms of lack of adequate supplementation. Pt is to continue with current 
dose of medication unless directed otherwise. Check labs at regular intervals q 
3 months or q 6 months based on previous levels of control. B12 def -injection 
today

                                                            2021

 

                                        Appointment: Franca Urbina 

WPtel:[phone]

                                        [address] [facility]

[place]                                              (30 min) Complex 2021

 

             Patient Education: Patient Medication Summary                      

     Completed    2021

 

             Appointment:                            Injection    2021

 

             Patient Education: Patient Medication Summary                      

     Completed    2021

 

             Patient Education: Patient Medication Summary                      

     Completed    2021

 

             Care Plan: Cbc With Differential                           Pending 

     2021

 

             Care Plan: Comp Metabolic                           Pending      

 

             Care Plan: Tsh                           Pending      2021

 

             Care Plan: Magnesium                           Pending      

021

 

             Care Plan: Free T4                           Pending      

 

                          Visit Plan:               Hypertension - well controll

ed - continue with current medications,

continue with no added salt diet. Pt has been encouraged to exercise daily. The 
pt has been advised to call the office if there are any acute concerns about 
change in blood pressure readings at home. Anemia- recheck labs -continue b12 
injections Edema - pt has been advised to elevate legs to prevent dependent 
edema, compression has been recommended to help to naturally decrease peripheral
edema. Diuretic use has been discussed and pt has been instructed in appropriate
use of such medication as necessary to further attempt to reduce peripheral 
edema. Hypothyroidism -check levels with next labs

                                                            06/15/2021

 

                                        Appointment: Franca Urbina 

WPtel:[phone]

                                        [address] [facility]

[place]                                              (30 min) Complex 06/15/2021

 

             Patient Education: Patient Medication Summary                      

     Completed    06/15/2021

 

                                        Appointment: Franca Urbina 

WPtel:[phone]

                                        [address] [facility]

[place]                                              (30 min) Complex 2021

 

                                        Appointment: Kimberly Souza 

WPtel:[phone]

                                        [address] [facility]

                                              (15 min) Moderate 04/15/2021

 

                          Visit Plan:               Hypertension - well controll

ed - continue with current medications,

continue with no added salt diet. Pt has been encouraged to exercise daily. The 
pt has been advised to call the office if there are any acute concerns about 
change in blood pressure readings at home. CHF - congestive heart failure - Pt 
has Chronic congestive heart failure - and is currently fairly well maintained 
on the current medications. Today there is no change in the treatment course. If
symptoms worsen, increase edema or more that 2-3 pound weight gain over a week 
without improvement in the symptoms with a decrease in the sodium of the diet, 
then the pt is to have the office alerted. Anemia- continue with B12 injections 
per 

                                                            2021

 

                                        Appointment: Franca Urbina 

WPtel:[phone]

                                        [address] [facility]

                                              (30 min) Complex 2021

 

             Patient Education: Patient Medication Summary                      

     Completed    2021

 

                          Visit Plan:               Acute on chronic CHF - patie

nt refuses cardiology -continue with 

oxygen at all times- patient did not wear oxygen to appt- patient and sister 
verbalized understanding of plan. Did recommend follow up with cardiology due to
recurrent hospitalizations for CHF and patient continues to refuse Anemia- 
repeat labs today HTN -controlled- no change in medications today

                                                            2021

 

                                        Appointment: Franca Urbina 

WPtel:[phone]

                                        [address] [facility]

                                              (30 min) Complex 2021

 

             Patient Education: Patient Medication Summary                      

     Completed    2021

 

             Patient Education: Patient Medication Summary                      

     Completed    2021

 

                          Visit Plan:               Chronic Congestive heart sheryl

lure - symptoms stable on lasix -pt has

refused referral to Cardiology - she is not interested in any other physician 
and will not go to another specialist per her report - pt to continue with 
chronic oxygen therapy. Hypertension - well controlled - continue with current 
medications, continue with no added salt diet. Pt has been encouraged to 
exercise daily. The pt has been advised to call the office if there are any 
acute concerns about change in blood pressure readings at home. Hypothyroidism -
pt with chronic hypothyroidism, continue with current medication, will monitor 
pt for signs or symptoms of lack of adequate supplementation. Pt is to continue 
with current dose of medication unless directed otherwise. Check labs at regular
intervals q 3 months or q 6 months based on previous levels of control.

                                                            2021

 

             Patient Education: Patient Medication Summary                      

     Completed    2021

 

                                        Appointment: Kimberly Souza 

WPtel:[phone]

                                        [address] [facility] [place]

                                              (30 min) Complex 2020

 

                          Visit Plan:               Chronic Congestive heart sheryl

walker - symptoms stable on lasix - need 

to initiate referral to Dr. Mendoza - if not already done - pt to continue with 
chronic oxygen therapy - due to her weakness and need for easier to transport of
the oxygen from place to place, I have recommended that she needs a portable 
oxygen concentrator. She is too weak to transport large oxygen bottles from 
place to place and is too weak to move them up the stairs in her home. She has 
tripped over her oxygen tubing from the large oxygen concentrator in her house, 
therefore the extra long tubing is not safe to have in her home. She also has 
trouble getting the oxygen tanks moved from house to vehicles and around places 
if she goes shopping. Hypertension - uncontrolled - the patient's medications 
have been modified as documented in the visit note. The patient has been 
counseled to cut back on salt in diet for a no added salt diet, low fat diet, 
start an exercise program with low weight bearing exercises and higher aerobic 
activity for heart health. The patient is to check blood pressure readings as an
outpatient and either fax, call, or email the readings to the office next week 
for practitioner to review. The pt is to call for acute concerns. Increase 
losartan from 50mg daily to 50mg twice daily. Hypothyroidism - pt with chronic 
hypothyroidism, continue with current medication, will monitor pt for signs or 
symptoms of lack of adequate supplementation. Pt is to continue with current 
dose of medication unless directed otherwise. Check labs at regular intervals q 
3 months or q 6 months based on previous levels of control. Fasting labs to be 
done to be drawn by [facility].

                                                            10/15/2020

 

                                        Appointment: Kimberly Souza 

WPtel:[phone]

                                        [facility]

[place]                                              (15 min) Moderate 10/15/2020

 

             Patient Education: Patient Medication Summary                      

     Completed    10/15/2020

 

                          Visit Plan:               CHF - congestive heart failu

re - Pt has Chronic congestive heart 

failure - and is currently fairly well maintained on the current medications. 
Today there is no change in the treatment course. If symptoms worsen, increase 
edema or more that 2-3 pound weight gain over a week without improvement in the 
symptoms with a decrease in the sodium of the diet, then the pt is to have the 
office alerted. Dementia - Pt with slowly progressive pattern. I have discussed 
with pt and family the prognosis of this disease state and the need for the 
family to anticipate further decline with behavior changes. Continue with 
current plan of treatment.

                                                            2020

 

             Patient Education: Patient Medication Summary                      

     Completed    2020







Instructions





                                        Comment

 

                                        . Hypertension - well controlled - pop

nue with current medications, continue 

with no added salt diet.  Pt has been encouraged to exercise daily.

The pt has been advised to call the office if there are any acute concerns about
change in blood pressure readings at home.



Hypothyroidism - pt with chronic hypothyroidism, continue with current 
medication, will monitor pt for signs or symptoms of lack of adequate 
supplementation.  Pt is to continue with current dose of medication unless 
directed otherwise.  Check labs at regular intervals q 3 months or q 6 months 
based on previous levels of control.



B12 def -injection today 



 

                                        HAVE HOME HEALTH DRAW LABS WHEN THEY DO 

NEXT B12 INJECTION - CBC, CMP, TSH, FREE

T4, BNP, MAGNESIUM . Hypertension - well controlled - continue with current 
medications, continue with no added salt diet.  Pt has been encouraged to 
exercise daily.

The pt has been advised to call the office if there are any acute concerns about
change in blood pressure readings at home.



Anemia- recheck labs -continue b12 injections 



Edema - pt has been advised to elevate legs to prevent dependent edema, 
compression has been recommended to help to naturally decrease peripheral edema.
 Diuretic use has been discussed and pt has been instructed in appropriate use 
of such medication as necessary to further attempt to reduce peripheral edema.



Hypothyroidism -check levels with next labs 

 

                                        CONTINUE LOW SODIUM DIET



CONTINUE HOME HEALTH WITH [facility] B12 INJECTIONS



FOLLOW UP WITH DR OVALLE FOR PFTS AND CT SCAN

                                        . Hypertension - well controlled - pop

nue with current medications, continue 

with no added salt diet.  Pt has been encouraged to exercise daily.

The pt has been advised to call the office if there are any acute concerns about
change in blood pressure readings at home.



CHF - congestive heart failure - Pt has Chronic congestive heart failure - and 
is currently fairly well maintained on the current medications.  Today there is 
no change in the treatment course.  If symptoms worsen, increase edema or more 
that 2-3 pound weight gain over a week without improvement in the symptoms with 
a decrease in the sodium of the diet, then the pt is to have the office alerted.



Anemia- continue with B12 injections per HH 



 

                                        . Acute on chronic CHF - patient refuses

 cardiology -continue with oxygen at all

times- patient did not wear oxygen to appt- patient and sister verbalized 
understanding of plan. Did recommend follow up with cardiology due to recurrent 
hospitalizations for CHF and patient continues to refuse 



Anemia- repeat labs today 



HTN -controlled- no change in medications today 

 

                                        . Chronic Congestive heart failure - sym

ptoms stable on lasix  -pt has refused 

referral to Cardiology - she is not interested in any other physician and will 
not go to another specialist per her report - pt to continue with chronic oxygen
therapy.



Hypertension - well controlled - continue with current medications, continue 
with no added salt diet.  Pt has been encouraged to exercise daily.

The pt has been advised to call the office if there are any acute concerns about
change in blood pressure readings at home.



Hypothyroidism - pt with chronic hypothyroidism, continue with current 
medication, will monitor pt for signs or symptoms of lack of adequate 
supplementation.  Pt is to continue with current dose of medication unless 
directed otherwise.  Check labs at regular intervals q 3 months or q 6 months 
based on previous levels of control.



 

                                        . Chronic Congestive heart failure - sym

ptoms stable on lasix  - need to 

initiate referral to Dr. Mendoza - if not already done - pt to continue with 
chronic oxygen therapy - due to her weakness and need for easier to transport of
the oxygen from place to place, I have recommended that she needs a portable 
oxygen concentrator.  She is too weak to transport large oxygen bottles from 
place to place and is too weak to move them up the stairs in her home.  She has 
tripped over her oxygen tubing from the large oxygen concentrator in her house, 
therefore the extra long tubing is not safe to have in her home.  She also has 
trouble getting the oxygen tanks moved from house to vehicles and around places 
if she goes shopping.



Hypertension - uncontrolled - the patient's medications have been modified as 
documented in the visit note.  The patient has been counseled to cut back on 
salt in diet for a no added salt diet, low fat diet, start an exercise program 
with low weight bearing exercises and higher aerobic activity for heart health. 


The patient is to check blood pressure readings as an outpatient and either fax,
call, or email the readings to the office next week for practitioner to review.

The pt is to call for acute concerns.

Increase losartan from 50mg daily to 50mg twice daily.



Hypothyroidism - pt with chronic hypothyroidism, continue with current 
medication, will monitor pt for signs or symptoms of lack of adequate 
supplementation.  Pt is to continue with current dose of medication unless 
directed otherwise.  Check labs at regular intervals q 3 months or q 6 months 
based on previous levels of control.



Fasting labs to be done to be drawn by [facility].

 

                                        Will set up with [facility]



pt is to consider moving to assisted living



pt is to get a life alert bracelet. CHF - congestive heart failure - Pt has 
Chronic congestive heart failure - and is currently fairly well maintained on 
the current medications.  Today there is no change in the treatment course.  If 
symptoms worsen, increase edema or more that 2-3 pound weight gain over a week 
without improvement in the symptoms with a decrease in the sodium of the diet, 
then the pt is to have the office alerted.



Dementia - Pt with slowly progressive pattern.  I have discussed with pt and 
family the prognosis of this disease state and the need for the family to 
anticipate further decline with behavior changes.  Continue with current plan of
treatment.









Medical Equipment

No Medical Equipment data



Health Concerns Section

Health Concerns data not found



Goals Section

Goals data not found



Interventions Section

Interventions data not found



Health Status Evaluations/Outcomes Section

Health Status Evaluations/Outcomes data not found



Advance Directives

No Advance Directive data

## 2022-01-03 NOTE — ED BACK PAIN
General


Chief Complaint:  General Problems/Pain


Stated Complaint:  L BACK PAIN, FALL,BACK SURGERY 2 YRS AGO


Nursing Triage Note:  


TO ED PER W/C REPORTS FELL 2 WEEKS AGO CON'T TO HAVE PAIN WITH  PAIN MEDS  IS 


HERE BECAUSE SHE ONLY WANT'S ONLY A CT





History of Present Illness


Date Seen by Provider:  Jas 3, 2022


Time Seen by Provider:  16:20


Initial Comments


87-year-old  female presents for low lumbar back pain that is been 

present for approximately 2 weeks.  She reports coming to the hospital to get a 

CT scan but she failed to see her primary care provider prior to that to have it

ordered.  She denies any symptoms radiating to her legs, she has no bowel or 

bladder incontinence or retention.  She ambulates with a walker at times.  Her 

daughter-in-law is present with her and is concerned because she lives in a two-

story house and refuses to have assistance or consider assisted living. She had 

kyphoplasty done in the past at [facility].


Location:  Lumbar Spine


Timing/Duration:  Other (2 weeks)


Severity:  Moderate


Pain/Injury Location:  Back


Associated Symptoms:  muscle spasms; No numbness in legs/feet, No tingling in 

legs/feet, No sensory/motor loss; lower back pain; No loss of bladder control, 

No loss of bowel control





Allergies and Home Medications


Allergies


Coded Allergies:  


     Tetracyclines (Verified  Allergy, Unknown, 20)


Uncoded Allergies:  


     PCN (Allergy, Unknown, 20)





Patient Home Medication List


Home Medication List Reviewed:  Yes


Albuterol Sulfate (Albuterol Sulfate) 1.25 Mg/3 Ml Vial.neb, 1.25 MG INH Q4H PRN

for SHORTNESS OF BREATH, (Reported)


   Entered as Reported by: ORLY HAZEL on 20 1102


Albuterol Sulfate (Ventolin Hfa) 18 Gm Hfa.aer.ad, 1 PUFF IH QID


   Prescribed by: ELIJAH SOUZA on 21 0912


Ascorbic Acid/Ascorbate Sodium (Vitamin C 250 mg Tablet Chew) 250 Mg Tab.chew, 

250 MG PO DAILY, (Reported)


   Entered as Reported by: ORLY HAZEL on 20 1103


Aspirin (Aspirin) 81 Mg Tab.chew, 81 MG PO DAILY, (Reported)


   Entered as Reported by: ORLY HAZEL on 20 1103


Calcitonin Sod (Miacalcin) 400 Intlu/2 Ml Soln, 1 SPR NSEACH DAILY


   Prescribed by: JOEY HUBBARD on 1/3/22 182


Calcium Phosphate Trib/Vit D3 (Calcium Gummies) 1 Each Tab.chew, 1 EACH PO 

DAILY, (Reported)


   Entered as Reported by: ORLY HAZEL on 20 1103


Cefdinir (Cefdinir) 300 Mg Capsule, 300 MG PO BID


   Prescribed by: ELIJAH SOUZA on 21 0912


Cetirizine HCl (Cetirizine HCl) 10 Mg Tablet, 10 MG PO DAILY, (Reported)


   Entered as Reported by: ORLY HAZEL on 20 1013


Cholecalciferol (Vitamin D3) (Vitamin D3) 125 Mcg Tablet, 125 MCG PO DAILY, 

(Reported)


   Entered as Reported by: ORLY HAZEL on 20 1103


Cyclobenzaprine HCl (Cyclobenzaprine HCl) 10 Mg Tablet, 10 MG PO Q8H PRN for 

SPASMS


   Prescribed by: JOEY HUBBARD on 1/3/22 1826


Docusate Sodium (Colace) 100 Mg Capsule, 100 MG PO DAILY


   Prescribed by: RASHEL ANN on 10/18/21 1643


Folic Acid/Multivit-Minerals (Women's Multivitamin Gummies) 200 Mcg Tab.chew, 

200 MCG PO DAILY, (Reported)


   Entered as Reported by: ORLY HAZEL on 20 1103


Furosemide (Lasix) 20 Mg Tablet, 20 MG PO DAILY


   Prescribed by: ELIJAH SOUZA on 21 0912


Hydrocodone/Acetaminophen (Hydrocodone-Acetamin 5-325 mg) 1 Each Tablet, 1 TAB 

PO Q4H PRN for PAIN-MODERATE (5-7)


   Prescribed by: RASHEL ANN on 10/18/21 1644


Levothyroxine Sodium (Levothyroxine Sodium) 125 Mcg Tablet, 125 MCG PO DAILY, 

(Reported)


   Entered as Reported by: ORLY HAZEL on 20 1013


Melatonin (Melatonin) 5 Mg Tablet, 5 MG PO HS PRN for SLEEP, (Reported)


   Entered as Reported by: ORLY HAZEL on 20 1103


Metoprolol Succinate (Metoprolol Succinate) 25 Mg Tab.er.24h, 25 MG PO DAILY


   Prescribed by: ELIJAH SOUZA on 21 0912


Potassium Bicarbonate/Cit AC (Effer-K 10 Meq Tablet Eff) 10 Meq Tablet.eff, 10 

MEQ PO DAILY, (Reported)


   Entered as Reported by: KEIRA TINSLEY on 21 1534


Tramadol HCl (Tramadol HCl) 50 Mg Tablet, 50 MG PO Q6H PRN for PAIN


   Prescribed by: JOEY HUBBARD on 1/3/22 1826





Review of Systems


Constitutional:  no symptoms reported, see HPI


Musculoskeletal:  see HPI, back pain





All Other Systems Reviewed


Negative Unless Noted:  Yes





Past Medical-Social-Family Hx


Patient Social History


Tobacco Use?:  Yes


Smoking Status:  Former Smoker


Substance use?:  No


Alcohol Use?:  No





Immunizations Up To Date


Tetanus Booster (TDap):  Unknown


First/Initial COVID19 Vaccinat:  UNKNOW DATE.


Second COVID19 Vaccination Rubin:  UNKNOW DATE.


COVID19 Vaccine :  ?





Seasonal Allergies


Seasonal Allergies:  No





Past Medical History


Surgeries:  Yes (THORACENTESIS)


Neurological


Respiratory:  Yes (PLEURAL EFFUSIONS WITH THORACENTESIS)


COPD


Cardiac:  Yes (CHF)


Atrial Fibrillation, Chronic Edema/Swelling, Hypertension


Neurological:  No


Reproductive Disorders:  No


GYN History:  Menopausal


Sexually Transmitted Disease:  No


HIV/AIDS:  No


Genitourinary:  No


Gastrointestinal:  No


Musculoskeletal:  No


Arthritis


Endocrine:  Yes


Hypothyroidsim


HEENT:  Yes


Macular Degeneration


Loss of Vision:  Denies


Hearing Impairment:  Hard of Hearing


Cancer:  No


Psychosocial:  No


Integumentary:  No


Blood Disorders:  No





Family Medical History


Reviewed Nursing Family Hx


Heart Disease, Cancer, Diabetes, Hypertension





Physical Exam


Vital Signs





Vital Signs - First Documented








 1/3/22





 16:01


 


Temp 36.4


 


Pulse 95


 


Resp 18


 


B/P (MAP) 227/115 (152)


 


Pulse Ox 93


 


O2 Delivery Room Air





Capillary Refill : Less Than 3 Seconds


Height, Weight, BMI


Height: '"


Weight: lbs. oz. kg; 33.00 BMI


Method:


General Appearance:  No Apparent Distress, WD/WN


Neck:  Full Range of Motion, Normal Inspection, Non Tender, Supple


Cardiovascular:  Regular Rate, Rhythm, No Edema, Normal Peripheral Pulses


Respiratory:  Chest Non Tender, Lungs Clear, Normal Breath Sounds


Gastrointestinal:  Normal Bowel Sounds, Non Tender, Soft


Back:  Normal Inspection, Decreased Range of Motion, Vertebral Tenderness (Lower

lumbar spine.), Other (Ambulates with a steady gait, able to rise onto her toes 

and heels.  Power 5/5 L4-S1.  Pain with straight leg raise bilaterally.)


Extremity:  Normal Capillary Refill, Normal Inspection, Normal Range of Motion, 

No Pedal Edema


Neurologic/Psychiatric:  Alert, Oriented x3, No Motor/Sensory Deficits, Normal 

Mood/Affect





Progress/Results/Core Measures


Results/Orders


My Orders





Orders - JOEY HUBBARD


Cyclobenzaprine Tablet (Flexeril Tablet) (1/3/22 17:11)


Tramadol Tablet (Ultram Tablet) (1/3/22 17:11)


Ct Lumbar Spine Wo (1/3/22 17:11)


Hydralazine Tablet (Apresoline Tablet) (1/3/22 17:18)





Vital Signs/I&O











 1/3/22 1/3/22





 16:01 19:07


 


Temp 36.4 


 


Pulse 95 82


 


Resp 18 18


 


B/P (MAP) 227/115 (152) 167/94


 


Pulse Ox 93 94


 


O2 Delivery Room Air 














Blood Pressure Mean:                    152











Progress


Progress Note :  


   Time:  16:20


Progress Note


Patient seen and evaluated, will give tramadol 50 mg for pain, Flexeril 10 mg 

for muscle spasm and obtain a CT of the lumbar spine.


1700 blood pressure has continued to be elevated, patient is anxious and in 

pain, she reports taking her medication today as prescribed.  Will give 

hydralazine and monitor.


1800 CT shows compression fracture at L5.  This was discussed with the patient 

and her daughter-in-law.  She does report improvement in her symptoms and blood 

pressure has come down.  She wishes to pursue having a kyphoplasty again, and 

this will be discussed with Dr. Souza.  Discharge instructions and return 

precautions reviewed with her.





Diagnostic Imaging





   Diagonstic Imaging:  CT


   Plain Films/CT/US/NM/MRI:  other (back)


Comments


NAME:   LOIS SINGH


MED REC#:   [id number]


ACCOUNT#:   [id number]


PT STATUS:   REG ER


:   1934


PHYSICIAN:   JOEY HUBBARD


ADMIT DATE:   22/ER


                                   ***Draft***


Date of Exam:22





CT LUMBAR SPINE WO








PROCEDURE: CT lumbar spine without contrast.





TECHNIQUE: Multiple contiguous axial images were obtained through


the lumbar spine without the use of intravenous contrast.


Sagittal and coronal reformations were then performed. Auto


Exposure Controls were utilized during the CT exam to meet ALARA


standards for radiation dose reduction. 





INDICATION: Fall with back pain.





COMPARISON: 10/18/2021.





FINDINGS: There has been development of compression fracture


deformity involving the superior endplate of L5. There is stable


appearance of the treated L2 vertebral body with associated


posterior cortex retropulsion. There is extensive disc bulging of


the L4-L5 disc with associated ligamentum flavum hypertrophy


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 There is


also disc bulging and endplate spurring, greater toward the right


at L5-S1 causing moderate right neuroforaminal stenosis. There is


mild amount of right pleural fluid.





IMPRESSION:


1. New mild-to-moderate compression fracture of L5 vertebral body


since 10/18/2021. Correlation with site of fall and development


of pain would be useful.


2. 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


moderate right neuroforaminal stenosis at L5-S1 as described.





  Dictated on workstation # [id number]








Dict:   [id number]


Trans:   22 [id number]


[id number]





Interpreted by:     FRANCISCO REYNOLDS MD


Electronically signed by:


   Reviewed:  Reviewed by Me





Departure


Impression





   Primary Impression:  


   Back pain


   Qualified Codes:  M54.50 - Low back pain, unspecified; G89.29 - Other chronic

   pain


   Additional Impression:  


   Compression fracture of L5 vertebra


   Qualified Codes:  S32.050A - Wedge compression fracture of fifth lumbar 

   vertebra, initial encounter for closed fracture


Disposition:   HOME, SELF-CARE


Condition:  Improved





Departure-Patient Inst.


Decision time for Depature:  18:00


Referrals:  


ELIJAH SOUZA MD (PCP/Family)


Primary Care Physician


Patient Instructions:  Low Back Pain  (DC), Vertebral Compression Fracture (DC)





Add. Discharge Instructions:  


Take medication as prescribed.


Follow-up with Dr. Souza's office if you desire to have kyphoplasty and this 

can be arranged at [facility].


Use walker at all times for ambulating.


Return to the emergency department for new problems.





All discharge instructions reviewed with patient and/or family. Voiced 

understanding.


Scripts


Calcitonin Sod (Miacalcin) 400 Intlu/2 Ml Soln


1 SPR NSEACH DAILY, #1 EA 2 Refills


   Prov: JOEY HUBBARD         1/3/22 


Tramadol HCl (Tramadol HCl) 50 Mg Tablet


50 MG PO Q6H PRN for PAIN, #20 TAB 0 Refills


   Prov: JOEY HUBBARD         1/3/22 


Cyclobenzaprine HCl (Cyclobenzaprine HCl) 10 Mg Tablet


10 MG PO Q8H PRN for SPASMS, #15 TAB 0 Refills


   Prov: JOEY HUBBARD         1/3/22





Copy


Copies To 1:   ELIJAH SOUZA MD, AMY ARNP                   Jas 3, 2022 17:17

## 2022-01-03 NOTE — XMS REPORT
CCD document using C-CDA

                             Created on: 2021



Tierra Rizvi

External Reference #: [id number]

: 1934

Sex: Female



Demographics





                          Address                   [address]  [place]  [address]

 

                          Home Phone                [phone]

 

                          Preferred Language        Unknown

 

                          Marital Status            Unknown

 

                          Lutheran Affiliation     Unknown

 

                          Race                      White

 

                          Ethnic Group              Not  or 





Author





                          Author                    Tierra Moreno

 

                          Organization              Kimberly Souza MD, [facility]

 

                          Address                   [address] [place]  [address]



 

                          Phone                     [phone]







Care Team Providers





                    Care Team Member Name Role                Phone

 

                    Kimberly Souza     PP                  Unavailable

 

                          CCM                       Unavailable







Summary Purpose

Interface Exchange



Insurance Providers





             Payer name   Policy type / Coverage type Covered party ID Effective

 Begin Date 

Effective End Date

 

             [employer] Medicare Part B Medicare Part B [id number]  Unknown      Unkno

wn

 

             [facility] Medicare Part B [id number] Unkno

wn      Unknown







Family history





Sister



                          Diagnosis                 Age At Onset

 

                          Arthritis                 Unknown

 

                          Hypertension              Unknown

 

                          Anemia                    Unknown

 

                          Diabetes mellitus Type 2  Unknown

 

                          Osteoporosis              Unknown







Son



                          Diagnosis                 Age At Onset

 

                          Myocardial infarction     Unknown

 

                          Hypertension              Unknown

 

                          Diabetes mellitus Type 2  Unknown







Father



                          Diagnosis                 Age At Onset

 

                          Cancer                    Unknown

 

                          Alcoholism                Unknown

 

                          kidney disease            Unknown







Mother



                          Diagnosis                 Age At Onset

 

                          Cancer                    Unknown







Brother



                          Diagnosis                 Age At Onset

 

                          Hypertension              Unknown

 

                          Cancer                    Unknown

 

                          Alcoholism                Unknown

 

                          Diabetes mellitus Type 2  Unknown

 

                          kidney disease            Unknown

 

                          Myocardial infarction     Unknown







Social History





                Social History Element Codes           Description     Effective

 Dates

 

                Marital status  Unknown                  2020

 

                Employment      Unknown         Retired         2020

 

                    Tobacco history     SNOMED CT: 8288436  Quit over 10 years a

go

                          2020

 

                Alcohol history SNOMED CT: 385158723 Never drinks alcohol 2020







Allergies, Adverse Reactions, Alerts





           Substance  Reaction   Codes      Entered Date Inactivated Date Status

 

           Penicillin rash,      Unknown    2020 No Inactive Date Active

 

             * OTHER REACTION - SEE ANSWER BOX Tetracycline- Rash Unknown      0

2020   No 

Inactive Date                           Active







Problems





                Condition       Codes           Effective Dates Condition Status

 

                spinal stenosis Unknown         10/25/2021      Active

 

                          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

ication ICD-10: M48.062

ICD-9: 724.03             10/25/2021                Active

 

                          CHF (congestive heart failure) ICD-10: I50.9

ICD-9: 428.0              2020                Active

 

                          On supplemental oxygen therapy ICD-10: Z99.81

ICD-9: V46.2              10/15/2020                Active

 

                          Panlobular emphysema      ICD-10: J43.1

ICD-9: 492.8              2021                Active

 

                          Atrophy of thyroid (acquired) ICD-10: E03.4

ICD-9: 244.8              10/15/2020                Active

 

                          Essential (primary) hypertension ICD-10: I10

ICD-9: 401.1              10/15/2020                Active

 

                          Vitamin B12 deficiency (dietary) anemia ICD-10: D51.8

ICD-9: 281.1              2021                Active

 

                          Localized edema           ICD-10: R60.0

ICD-9: 782.3              06/15/2021                Active

 

                          Acute on chronic diastolic congestive heart failure IC

D-10: I50.33

ICD-9: 428.33             2021                Active

 

                          Anemia                    ICD-10: D64.9

ICD-9: 285.9              2021                Active

 

                          Dementia without behavioral disturbance, unspecified d

ementia type ICD-10: 

F03.90

ICD-9: 294.20             2020                Active







Medications





          Medication Codes     Instructions Start Date Stop Date Status    Fill 

Instructions

 

                    cyanocobalamin (vit B-12) 1,000 mcg/mL injection solution Rx

Norm: 321458      1 

Milliliter(s) Injection monthly 2021      Active          

will need 

syringe/needle for monthly injection dx b12 deficiency

 

                    cyanocobalamin (vit B-12) 1,000 mcg/mL injection solution Rx

Norm: 045266      1 

Milliliter(s) Injection monthly 2021      Inactive        

will need 

syringe/needle for monthly  injection dx b12 deficiency

 

                    hydrocodone 5 mg-acetaminophen 325 mg tablet RxNorm: 751647 

     Take 1 Tablet(s) 

Oral three times a day as needed for pain 10/25/2021      No Stop Date    Active

           

 

                          albuterol sulfate 2.5 mg/3 mL (0.083 %) solution for n

ebulization RxNorm: 503174

             USE 1 VIAL IN NEBULIZER TWICE DAILY 10/19/2021   2021   Activ

e        

 

                          albuterol sulfate 2.5 mg/3 mL (0.083 %) solution for n

ebulization RxNorm: 209719

             USE 1 VIAL IN NEBULIZER TWICE DAILY 10/19/2021   10/19/2021   Inact

maryjo      

 

                levothyroxine 112 mcg tablet RxNorm: 986737  1 Tablet(s) Oral ev

berta day 

10/07/2021          2022          Active               

 

                levothyroxine 112 mcg tablet RxNorm: 224772  1 Tablet(s) Oral ev

berta day 

10/07/2021          10/07/2021          Inactive             

 

                levothyroxine 112 mcg tablet RxNorm: 604279  1 Tablet(s) Oral ev

berta day 

2021          10/07/2021          Inactive             

 

                    cyanocobalamin (vit B-12) 1,000 mcg/mL injection solution Rx

Norm: 044464      Take 

Milliliter(s) Injection 2021      Inactive         

 

                    cyanocobalamin (vit B-12) 1,000 mcg/mL injection solution Rx

Norm: 317442      Take 

Milliliter(s) Injection 2021      Inactive         

 

                          albuterol sulfate 2.5 mg/3 mL (0.083 %) solution for n

ebulization RxNorm: 979136

             1 Unit Dose Inhalation two times a day DX J43.1 2021   Inactive      

 

                losartan 50 mg tablet RxNorm: 812811  1 Tablet(s) Oral two times

 a day 2021          Active               

 

                          albuterol sulfate 2.5 mg/3 mL (0.083 %) solution for n

ebulization RxNorm: 036277

             1 Unit Dose Inhalation two times a day DX J43.1 2021   Inactive      

 

                          albuterol sulfate 2.5 mg/3 mL (0.083 %) solution for n

ebulization RxNorm: 721650

             1 Unit Dose Inhalation two times a day 2021   In

active      

 

                          albuterol sulfate 2.5 mg/3 mL (0.083 %) solution for n

ebulization RxNorm: 014654

             1 Unit Dose Inhalation two times a day 2021   In

active      

 

             Norvasc 5 mg tablet RxNorm: 083255 1 Tablet(s) Oral every day 2022                Active                     

 

             Norvasc 5 mg tablet RxNorm: 227958 1 Tablet(s) Oral every day 2021                Inactive                   

 

                    metoprolol succinate ER 25 mg tablet,extended release 24 hr 

RxNorm: 340591      1 

Tablet(s) Oral every day 2021      No Stop Date    Active           

 

                    Ventolin HFA 90 mcg/actuation aerosol inhaler RxNorm: 297278

      1-2 Puff(s) 

Inhalation Every 4 hrs as needed 2021      No Stop Date    Active         

  

 

             furosemide 20 mg tablet RxNorm: 717927 1 Tablet(s) Oral every day 0

2021   No 

Stop Date                 Active                     

 

                    furosemide 40 mg tablet RxNorm: 185574      1 Tablet(s) Oral

 as directed 40mg BID x 5

days then 40mg in am and 20mg afternoon thereafter 2020          

Inactive                                give qty sufficient

 

                    potassium chloride ER 10 mEq tablet,extended release RxNorm:

 136744      1 Tablet(s) 

Oral as directed 1 tab bid x 5 days then resume daily 2020          

Inactive                                qty sufficient

 

                    potassium bicarbonate-citric acid 10 mEq effervescent tablet

 RxNorm: 3855405     1 

Tablet(s) Oral every day 10/15/2020      2020      Inactive         

 

                levothyroxine 125 mcg tablet RxNorm: 646058  1 Tablet(s) Oral ev

berta day 

10/15/2020          2021          Inactive             

 

                losartan 50 mg tablet RxNorm: 022081  1 Tablet(s) Oral two times

 a day 10/15/2020

                    2021          Inactive             

 

             furosemide 40 mg tablet RxNorm: 097473 1 Tablet(s) Oral every day 1

0/15/2020   

2020                Inactive                   

 

             Zyrtec 10 mg tablet RxNorm: 8276536 1 Tablet(s) Oral every day 10/0

2020   

2021                Inactive                   

 

                levothyroxine 125 mcg tablet RxNorm: 037660  1 Tablet(s) Oral ev

berta day 

10/05/2020          10/14/2020          Inactive             

 

             Zyrtec 10 mg tablet RxNorm: 5525571 1 Tablet(s) Oral every day 10/0

2020   

10/04/2020                Inactive                   

 

                aspirin 81 mg tablet,delayed release RxNorm: 894192  1 Tablet(s)

 Oral every day 

2020          No Stop Date        Active               

 

             Vitamin C 250 mg tablet RxNorm: 399706 1 Tablet(s) Oral every day 0

2020   No 

Stop Date                 Active                     

 

                Vitamin D3 50 mcg (2,000 unit) tablet RxNorm: 933490  1 Tablet(s

) Oral every day 

2020          No Stop Date        Active               

 

                levothyroxine 88 mcg tablet RxNorm: 805163  1 Tablet(s) Oral ledy

ry day 2020

                    10/04/2020          Inactive             

 

             losartan 50 mg tablet RxNorm: 282169 1 Tablet(s) Oral every day 09/

   

10/14/2020                Inactive                   

 

             furosemide 40 mg tablet RxNorm: 782068 1 Tablet(s) Oral every day 0

2020   

10/14/2020                Inactive                   

 

                    potassium bicarbonate-citric acid 10 mEq effervescent tablet

 RxNorm: 2817546     1 

Tablet(s) Oral every day 2020      10/14/2020      Inactive         

 

          Women's Multivitamin oral RxNorm:   oral      2020           Act

maryjo     

 

          Calcium 600 oral RxNorm: 1897 oral      2020           Active   

  

 

          albuterol sulfate inhalation RxNorm: 706594 inhalation 2020     

      Active     







Medication Administered





             Medication   Codes        Instructions Start Date   Status

 

                    cyanocobalamin (vit B-12) 1,000 mcg/mL injection solution Rx

Norm: 860069      

Milliliter                2021                No longer Active

 

                    cyanocobalamin (vit B-12) 1,000 mcg/mL injection solution Rx

Norm: 402026      

Milliliter                2021                No longer Active







Immunizations





                Vaccine         Codes           Date            Status

 

                Covid-19        CVX: 207        04/10/2021       

 

                Covid-19        CVX: 207        2021       

 

                Pneumococcal (Adult) Unknown         2019       

 

                Zoster          CVX: 121        2017       







Results





          Observation Observation Code Item      Item Code Result    Date      S

[facility]

 

          Cbc With Differential Ord2      WBC                 8.64 K/ul 20

21 Unknown

 

          Cbc With Differential Ord2      RBC                 3.76 M/ul 20

21 Unknown

 

          Cbc With Differential Ord2      HGB                 9.9 g/dl  20

21 Unknown

 

          Cbc With Differential Ord2      Neut%               61.9 %    20

21 Unknown

 

          Cbc With Differential Ord2      HCT                 33.4 %    20

21 Unknown

 

          Cbc With Differential Ord2      MCV                 88.8 fl   20

21 Unknown

 

          Cbc With Differential Ord2      Lymph%              28.1 %    20

21 Unknown

 

          Cbc With Differential Ord2      Mono%               8.0 %     20

21 Unknown

 

          Cbc With Differential Ord2      MCH                 26.3 pg   20

21 Unknown

 

          Cbc With Differential Ord2      MCHC                29.6 pg   20

21 Unknown

 

          Cbc With Differential Ord2      Eos%                1.9 %     20

21 Unknown

 

          Cbc With Differential Ord2      PLT                 332 K/ul  20

21 Unknown

 

          Cbc With Differential Ord2      Baso%               0.1 %     20

21 Unknown

 

          Cbc With Differential Ord2      RDW                 15.2 %    20

21 Unknown

 

          Cbc With Differential Ord2      Neut ABS#           5.35 K/ul 20

21 Unknown

 

          Cbc With Differential Ord2      Lymph ABS#           2.43 K/ul 

021 Unknown

 

          Cbc With Differential Ord2      Mono ABS#           0.7 K/ul  20

21 Unknown

 

          Cbc With Differential Ord2      Eos ABS#            0.2 K/ul  20

21 Unknown

 

          Cbc With Differential Ord2      Baso ABS#           0.0 K/ul  20

21 Unknown

 

          Tsh       Ord6      TSH (3rd IS)           0.21 uIU/mL 2021 Unkn

own

 

          Comp Metabolic Xsk020    NA                  141 mEq/L 2021 Unkn

own

 

          Comp Metabolic Bxt635    K                   4.1 mEq/L 2021 Unkn

own

 

          Comp Metabolic Luz319    CL                  104 mEq/L 2021 Unkn

own

 

          Comp Metabolic Nsc830    CO2                 28.0 mEq/L 2021 Unk

nown

 

          Comp Metabolic Qkq133    ANION GAP           13        2021 Unkn

own

 

          Comp Metabolic Whq472    GLUCOSE             78 mg/dL  2021 Unkn

own

 

          Comp Metabolic Alx203    Creat               0.8 mg/dL 2021 Unkn

own

 

          Comp Metabolic Ans586    eGFR                71 ml/min/1.73m2 20

21 Unknown

 

          Comp Metabolic Sln173    BUN                 25 mg/dL  2021 Unkn

own

 

          Comp Metabolic Xdw261    B/C Ratio           30.9 Ratio 2021 Unk

nown

 

          Comp Metabolic Kps329    CALCIUM             8.7 mg/dL 2021 Unkn

own

 

          Comp Metabolic Smv029    ALK PHOS            61 U/L    2021 Unkn

own

 

          Comp Metabolic Ysm777    AST(SGOT)           17 U/L    2021 Unkn

own

 

          Comp Metabolic Mco978    ALT(SGPT)           10 U/L    2021 Unkn

own

 

          Comp Metabolic Gni820    BILI T              0.6 mg/dL 2021 Unkn

own

 

          Comp Metabolic Uvq924    ALBUMIN             3.7 g/dL  2021 Unkn

own

 

          Comp Metabolic Few669    TPRO                6.1 g/dL  2021 Unkn

own

 

          Comp Metabolic Pnh167    GLOB                2.4 g/dL  2021 Unkn

own

 

          Comp Metabolic Fgh774    A/G Ratio           1.6 Ratio 2021 Unkn

own

 

          Comp Metabolic Hzp965    Osmo                285 mOsmo 2021 Unkn

own

 

          B12       Czv829    B12                 >1500.00 pg/ml 2021 Unkn

own

 

          Free T4   Ukg286    FREE T4             1.23 ng/dL 2021 Unknown

 

          Tibc      Ord40     Iron                32 ug/dl  2021 Unknown

 

          Tibc      Ord40     UIBC                402 ug/dL 2021 Unknown

 

          Tibc      Ord40     TIBC                434 ug/dL 2021 Unknown

 

          Tibc      Ord40     Fe-%Sat             7.4 %     2021 Unknown

 

          Ferritin  Ord22     FERRITIN            27.6 ng/mL 2021 Unknown

 

          B12       Ypc311    B12                 187.00 pg/ml 2021 Unknow

n

 

          Comp Metabolic Dpn878    NA                  139 mEq/L 2021 Unkn

own

 

          Comp Metabolic Yll737    K                   4.1 mEq/L 2021 Unkn

own

 

          Comp Metabolic Ijt618    CL                  103 mEq/L 2021 Unkn

own

 

          Comp Metabolic Twn871    CO2                 27.0 mEq/L 2021 Unk

nown

 

          Comp Metabolic Ybh771    ANION GAP           13        2021 Unkn

own

 

          Comp Metabolic Kok016    GLUCOSE             79 mg/dL  2021 Unkn

own

 

          Comp Metabolic Acy052    Creat               0.7 mg/dL 2021 Unkn

own

 

          Comp Metabolic Aei244    eGFR                80 ml/min/1.73m2 20

21 Unknown

 

          Comp Metabolic Kgy265    BUN                 21 mg/dL  2021 Unkn

own

 

          Comp Metabolic Bnp042    B/C Ratio           28.8 Ratio 2021 Unk

nown

 

          Comp Metabolic Aes681    CALCIUM             8.7 mg/dL 2021 Unkn

own

 

          Comp Metabolic Xuz731    ALK PHOS            74 U/L    2021 Unkn

own

 

          Comp Metabolic Flo782    AST(SGOT)           15 U/L    2021 Unkn

own

 

          Comp Metabolic Asv327    ALT(SGPT)           17 U/L    2021 Unkn

own

 

          Comp Metabolic Mcb934    BILI T              0.4 mg/dL 2021 Unkn

own

 

          Comp Metabolic Eob143    ALBUMIN             3.5 g/dL  2021 Unkn

own

 

          Comp Metabolic Ywl410    TPRO                5.9 g/dL  2021 Unkn

own

 

          Comp Metabolic Eal927    GLOB                2.4 g/dL  2021 Unkn

own

 

          Comp Metabolic Abk589    A/G Ratio           1.4 Ratio 2021 Unkn

own

 

          Comp Metabolic Nxo988    Osmo                279 mOsmo 2021 Unkn

own

 

          Cbc With Differential Ord2      WBC                 7.63 K/ul 20

21 Unknown

 

          Cbc With Differential Ord2      RBC                 3.38 M/ul 20

21 Unknown

 

          Cbc With Differential Ord2      HGB                 9.4 g/dl  20

21 Unknown

 

          Cbc With Differential Ord2      Neut%               52.6 %    20

21 Unknown

 

          Cbc With Differential Ord2      HCT                 31.5 %    20

21 Unknown

 

          Cbc With Differential Ord2      MCV                 93.2 fl   20

21 Unknown

 

          Cbc With Differential Ord2      Lymph%              33.8 %    20

21 Unknown

 

          Cbc With Differential Ord2      MCH                 27.8 pg   20

21 Unknown

 

          Cbc With Differential Ord2      Mono%               7.9 %     20

21 Unknown

 

          Cbc With Differential Ord2      MCHC                29.8 pg   20

21 Unknown

 

          Cbc With Differential Ord2      Eos%                5.4 %     20

21 Unknown

 

          Cbc With Differential Ord2      Baso%               0.3 %     20

21 Unknown

 

          Cbc With Differential Ord2      PLT                 329 K/ul  20

21 Unknown

 

          Cbc With Differential Ord2      Neut ABS#           4.02 K/ul 20

21 Unknown

 

          Cbc With Differential Ord2      RDW                 15.2 %    20

21 Unknown

 

          Cbc With Differential Ord2      Lymph ABS#           2.58 K/ul 

021 Unknown

 

          Cbc With Differential Ord2      Mono ABS#           0.6 K/ul  20

21 Unknown

 

          Cbc With Differential Ord2      Eos ABS#            0.4 K/ul  20

21 Unknown

 

          Cbc With Differential Ord2      Baso ABS#           0.0 K/ul  20

21 Unknown







Procedures





                    Procedure           Codes               Date

 

                    THER/PROPH/DIAG INJ SC/IM CPT-4: 03457        2021

 

                    VITAMIN B12 INJECTION 1000 mcg CPT-4:         

 

                    THER/PROPH/DIAG INJ SC/IM CPT-4: 40072        2021







Vital Signs





                          Date                      Vital

 

                2021      SpO2: 96%       SpO2: 87%       SpO2: 94%

 

                2021      Blood Pressure 1: 138/82 Code: 8480-6 BMI: 33.5 

Code: 00493-2 Heart 

Rate 1: 74 bpm      Height: 5'3" Code: 8302-2 SpO2: 98%           Temperature: 3

6.2 (C) / 97.1 

(F)                                     Weight: 189 lbs  Code: 91348-2

 

                06/15/2021      Blood Pressure 1: 134/80 Code: 8480-6 Heart Rate

 1: 77 bpm Height: 

5'3" Code: 8302-2         SpO2: 93%                 Temperature: 36.3 (C) / 97.3

 (F)

 

                2021      Blood Pressure 1: 136/88 Code: 8480-6 Heart Rate

 1: 60 bpm Height:  

Code: 8302-2        SpO2: 93%           Temperature: 36.3 (C) / 97.3 (F) Weight:

 174 lbs  Code: 

10339-2

 

                2021      Blood Pressure 1: 164/92 Code: 8480-6 BMI: 31.4 

Code: 96086-0 Heart 

Rate 1: 67 bpm      Height: 5'3" Code: 8302-2 SpO2: 94%           Temperature: 3

6.2 (C) / 97.1 

(F)                                     Weight: 177 lbs  Code: 69389-5

 

                2021      Blood Pressure 1: 134/72 Code: 8480-6 BMI: 30.8 

Code: 51611-4 Heart 

Rate 1: 74 bpm  Height: 5'3" Code: 8302-2 Respiratory Rate: 18 bpm SpO2: 95%    

   

Temperature: 36.3 (C) / 97.4 (F)        Weight: 174 lbs  Code: 65542-7

 

                10/15/2020      Blood Pressure 1: 202/94 Code: 8480-6 BMI: 30.6 

Code: 09673-4 Heart 

Rate 1: 73 bpm  Height: 5'3" Code: 8302-2 Respiratory Rate: 17 bpm SpO2: 91%    

   

Temperature: 36.8 (C) / 98.2 (F)        Weight: 173 lbs  Code: 67993-1

 

                2020      Blood Pressure 1: 144/90 Code: 8480-6 BMI: 29.4 

Code: 25535-4 Heart 

Rate 1: 72 bpm      Height: 5'3" Code: 8302-2 SpO2: 98%           Temperature: 3

6.7 (C) / 98.0 

(F)                                     Weight: 166 lbs  Code: 18262-7







Functional Status

No Functional Status data



Reason For Visit





                    Reason For Visit    Effective Dates     Notes

 

                    hypertension        2021           

 

                    hypertension        06/15/2021           

 

                    hypertension        2021           

 

                    Hospital Follow Up  2021           

 

                    shortness of breath 2021           

 

                    shortness of breath 10/15/2020           

 

                    Hospital Follow Up  2020           







Encounters





             Encounter    Performer    Location     Codes        Date

 

                                         EST. PATIENT, LEVEL I

Diagnosis: CHF (congestive heart failure)[ICD10: I50.9]

Diagnosis: On supplemental oxygen therapy[ICD10: Z99.81]

Diagnosis: Panlobular emphysema[ICD10: J43.1] Kimberly rhodes MD, 

[facility]                       CPT-4: 31133              2021

 

                                        [phone] EST. PATIENT, LEVEL IV

Diagnosis: Vitamin B12 deficiency (dietary) anemia[ICD10: D51.8]

Diagnosis: Essential (primary) hypertension[ICD10: I10]

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

 

                                        [phone] EST. PATIENT, LEVEL IV

Diagnosis: Essential (primary) hypertension[ICD10: I10]

Diagnosis: Vitamin B12 deficiency (dietary) anemia[ICD10: D51.8]

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

Diagnosis: Localized edema[ICD10: R60.0] Franca mejia MD, [facility]

                          CPT-4: 75313              06/15/2021

 

                                        [phone] [id number] EST. PATIENT, LEVEL IV

Diagnosis: CHF (congestive heart failure)[ICD10: I50.9]

Diagnosis: Essential (primary) hypertension[ICD10: I10]

Diagnosis: Vitamin B12 deficiency (dietary) anemia[ICD10: D51.8] Franca Souza MD, [facility] CPT-4: 21870        2021

 

                                        [phone]) [id number] EST. PATIENT, LEVEL IV

Diagnosis: Acute on chronic diastolic congestive heart failure[ICD10: I50.33]

Diagnosis: Anemia[ICD10: D64.9]

Diagnosis: Essential (primary) hypertension[ICD10: I10] [facility]          CPT-4: 66672              2021

 

                                        [phone] EST. PATIENT, LEVEL IV

Diagnosis: Essential (primary) hypertension[ICD10: I10]

Diagnosis: CHF (congestive heart failure)[ICD10: I50.9]

Diagnosis: Atrophy of thyroid (acquired)[ICD10: E03.4] Kimberly Souza MD, [facility]          CPT-4: 35863              2021

 

                                        [phone] [phone] EST. PATIENT, LEVEL IV

Diagnosis: Essential (primary) hypertension[ICD10: I10]

Diagnosis: CHF (congestive heart failure)[ICD10: I50.9]

Diagnosis: On supplemental oxygen therapy[ICD10: Z99.81]

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

 

                                        OFFICE  VISIT, NEW - LEVEL 3

Diagnosis: CHF (congestive heart failure)[ICD10: I50.9]

Diagnosis: Dementia without behavioral disturbance, unspecified dementia 
type[ICD10: F03.90] [facility] CPT-4: 13764    







Plan of Care





             Planned Activity Notes        Codes        Status       Date

 

             Patient Education: Patient Medication Summary                      

     Completed    10/25/2021

 

             Appointment:                            Nurse Visit  2021

 

             Patient Education: Patient Medication Summary                      

     Completed    2021

 

                          Visit Plan:               Hypertension - well controll

ed - continue with current medications,

continue with no added salt diet. Pt has been encouraged to exercise daily. The 
pt has been advised to call the office if there are any acute concerns about 
change in blood pressure readings at home. Hypothyroidism - pt with chronic 
hypothyroidism, continue with current medication, will monitor pt for signs or 
symptoms of lack of adequate supplementation. Pt is to continue with current 
dose of medication unless directed otherwise. Check labs at regular intervals q 
3 months or q 6 months based on previous levels of control. B12 def -injection 
today

                                                            2021

 

                                        Appointment: Franca Urbina 

WPtel:[phone]

                                        [address] [facility]

[place]                                              (30 min) Complex 2021

 

             Patient Education: Patient Medication Summary                      

     Completed    2021

 

             Appointment:                            Injection    2021

 

             Patient Education: Patient Medication Summary                      

     Completed    2021

 

             Patient Education: Patient Medication Summary                      

     Completed    2021

 

             Care Plan: Cbc With Differential                           Pending 

     2021

 

             Care Plan: Comp Metabolic                           Pending      

 

             Care Plan: Tsh                           Pending      2021

 

             Care Plan: Magnesium                           Pending      

021

 

             Care Plan: Free T4                           Pending      

 

                          Visit Plan:               Hypertension - well controll

ed - continue with current medications,

continue with no added salt diet. Pt has been encouraged to exercise daily. The 
pt has been advised to call the office if there are any acute concerns about 
change in blood pressure readings at home. Anemia- recheck labs -continue b12 
injections Edema - pt has been advised to elevate legs to prevent dependent 
edema, compression has been recommended to help to naturally decrease peripheral
edema. Diuretic use has been discussed and pt has been instructed in appropriate
use of such medication as necessary to further attempt to reduce peripheral 
edema. Hypothyroidism -check levels with next labs

                                                            06/15/2021

 

                                        Appointment: Franca Urbina 

WPtel:[phone]

                                        [address] [facility]

[place]                                              (30 min) Complex 06/15/2021

 

             Patient Education: Patient Medication Summary                      

     Completed    06/15/2021

 

                                        Appointment: Franca Urbina 

WPtel:[phone]

                                        [address] [facility]

[place]                                              (30 min) Complex 2021

 

                                        Appointment: Kimberly Souza 

WPtel:[phone]

                                        [address] [facility]

                                              (15 min) Moderate 04/15/2021

 

                          Visit Plan:               Hypertension - well controll

ed - continue with current medications,

continue with no added salt diet. Pt has been encouraged to exercise daily. The 
pt has been advised to call the office if there are any acute concerns about 
change in blood pressure readings at home. CHF - congestive heart failure - Pt 
has Chronic congestive heart failure - and is currently fairly well maintained 
on the current medications. Today there is no change in the treatment course. If
symptoms worsen, increase edema or more that 2-3 pound weight gain over a week 
without improvement in the symptoms with a decrease in the sodium of the diet, 
then the pt is to have the office alerted. Anemia- continue with B12 injections 
per 

                                                            2021

 

                                        Appointment: Franca Urbina 

WPtel:[phone]

                                        [address] [facility]

                                              (30 min) Complex 2021

 

             Patient Education: Patient Medication Summary                      

     Completed    2021

 

                          Visit Plan:               Acute on chronic CHF - patie

nt refuses cardiology -continue with 

oxygen at all times- patient did not wear oxygen to appt- patient and sister 
verbalized understanding of plan. Did recommend follow up with cardiology due to
recurrent hospitalizations for CHF and patient continues to refuse Anemia- 
repeat labs today HTN -controlled- no change in medications today

                                                            2021

 

                                        Appointment: Franca Urbina 

WPtel:[phone]

                                        [facility] [facility]

                                              (30 min) Complex 2021

 

             Patient Education: Patient Medication Summary                      

     Completed    2021

 

             Patient Education: Patient Medication Summary                      

     Completed    2021

 

                          Visit Plan:               Chronic Congestive heart sheryl

lure - symptoms stable on lasix -pt has

refused referral to Cardiology - she is not interested in any other physician 
and will not go to another specialist per her report - pt to continue with 
chronic oxygen therapy. Hypertension - well controlled - continue with current 
medications, continue with no added salt diet. Pt has been encouraged to 
exercise daily. The pt has been advised to call the office if there are any 
acute concerns about change in blood pressure readings at home. Hypothyroidism -
pt with chronic hypothyroidism, continue with current medication, will monitor 
pt for signs or symptoms of lack of adequate supplementation. Pt is to continue 
with current dose of medication unless directed otherwise. Check labs at regular
intervals q 3 months or q 6 months based on previous levels of control.

                                                            2021

 

             Patient Education: Patient Medication Summary                      

     Completed    2021

 

                                        Appointment: Kimberly Souza 

WPtel:[phone]

                                        [address] [facility]

[place]                                              (30 min) Complex 2020

 

                          Visit Plan:               Chronic Congestive heart sheryl

walker - symptoms stable on lasix - need 

to initiate referral to Dr. Mendoza - if not already done - pt to continue with 
chronic oxygen therapy - due to her weakness and need for easier to transport of
the oxygen from place to place, I have recommended that she needs a portable 
oxygen concentrator. She is too weak to transport large oxygen bottles from 
place to place and is too weak to move them up the stairs in her home. She has 
tripped over her oxygen tubing from the large oxygen concentrator in her house, 
therefore the extra long tubing is not safe to have in her home. She also has 
trouble getting the oxygen tanks moved from house to vehicles and around places 
if she goes shopping. Hypertension - uncontrolled - the patient's medications 
have been modified as documented in the visit note. The patient has been 
counseled to cut back on salt in diet for a no added salt diet, low fat diet, 
start an exercise program with low weight bearing exercises and higher aerobic 
activity for heart health. The patient is to check blood pressure readings as an
outpatient and either fax, call, or email the readings to the office next week 
for practitioner to review. The pt is to call for acute concerns. Increase 
losartan from 50mg daily to 50mg twice daily. Hypothyroidism - pt with chronic 
hypothyroidism, continue with current medication, will monitor pt for signs or 
symptoms of lack of adequate supplementation. Pt is to continue with current 
dose of medication unless directed otherwise. Check labs at regular intervals q 
3 months or q 6 months based on previous levels of control. Fasting labs to be 
done to be drawn by home health.

                                                            10/15/2020

 

                                        Appointment: Kimberly Souza 

WPtel:[phone]

                                        [address] [facility]

[place]                                              (15 min) Moderate 10/15/2020

 

             Patient Education: Patient Medication Summary                      

     Completed    10/15/2020

 

                          Visit Plan:               CHF - congestive heart failu

re - Pt has Chronic congestive heart 

failure - and is currently fairly well maintained on the current medications. 
Today there is no change in the treatment course. If symptoms worsen, increase 
edema or more that 2-3 pound weight gain over a week without improvement in the 
symptoms with a decrease in the sodium of the diet, then the pt is to have the 
office alerted. Dementia - Pt with slowly progressive pattern. I have discussed 
with pt and family the prognosis of this disease state and the need for the 
family to anticipate further decline with behavior changes. Continue with 
current plan of treatment.

                                                            2020

 

             Patient Education: Patient Medication Summary                      

     Completed    2020







Instructions





                                        Comment

 

                                        . Hypertension - well controlled - pop

nue with current medications, continue 

with no added salt diet.  Pt has been encouraged to exercise daily.

The pt has been advised to call the office if there are any acute concerns about
change in blood pressure readings at home.



Hypothyroidism - pt with chronic hypothyroidism, continue with current 
medication, will monitor pt for signs or symptoms of lack of adequate 
supplementation.  Pt is to continue with current dose of medication unless 
directed otherwise.  Check labs at regular intervals q 3 months or q 6 months 
based on previous levels of control.



B12 def -injection today 



 

                                        HAVE HOME HEALTH DRAW LABS WHEN THEY DO 

NEXT B12 INJECTION - CBC, CMP, TSH, FREE

T4, BNP, MAGNESIUM . Hypertension - well controlled - continue with current 
medications, continue with no added salt diet.  Pt has been encouraged to 
exercise daily.

The pt has been advised to call the office if there are any acute concerns about
change in blood pressure readings at home.



Anemia- recheck labs -continue b12 injections 



Edema - pt has been advised to elevate legs to prevent dependent edema, 
compression has been recommended to help to naturally decrease peripheral edema.
 Diuretic use has been discussed and pt has been instructed in appropriate use 
of such medication as necessary to further attempt to reduce peripheral edema.



Hypothyroidism -check levels with next labs 

 

                                        CONTINUE LOW SODIUM DIET



CONTINUE HOME HEALTH WITH [facility] B12 INJECTIONS



FOLLOW UP WITH DR OVALLE FOR PFTS AND CT SCAN

                                        . Hypertension - well controlled - pop

nue with current medications, continue 

with no added salt diet.  Pt has been encouraged to exercise daily.

The pt has been advised to call the office if there are any acute concerns about
change in blood pressure readings at home.



CHF - congestive heart failure - Pt has Chronic congestive heart failure - and 
is currently fairly well maintained on the current medications.  Today there is 
no change in the treatment course.  If symptoms worsen, increase edema or more 
that 2-3 pound weight gain over a week without improvement in the symptoms with 
a decrease in the sodium of the diet, then the pt is to have the office alerted.



Anemia- continue with B12 injections per  



 

                                        . Acute on chronic CHF - patient refuses

 cardiology -continue with oxygen at all

times- patient did not wear oxygen to appt- patient and sister verbalized 
understanding of plan. Did recommend follow up with cardiology due to recurrent 
hospitalizations for CHF and patient continues to refuse 



Anemia- repeat labs today 



HTN -controlled- no change in medications today 

 

                                        . Chronic Congestive heart failure - sym

ptoms stable on lasix  -pt has refused 

referral to Cardiology - she is not interested in any other physician and will 
not go to another specialist per her report - pt to continue with chronic oxygen
therapy.



Hypertension - well controlled - continue with current medications, continue 
with no added salt diet.  Pt has been encouraged to exercise daily.

The pt has been advised to call the office if there are any acute concerns about
change in blood pressure readings at home.



Hypothyroidism - pt with chronic hypothyroidism, continue with current 
medication, will monitor pt for signs or symptoms of lack of adequate 
supplementation.  Pt is to continue with current dose of medication unless 
directed otherwise.  Check labs at regular intervals q 3 months or q 6 months 
based on previous levels of control.



 

                                        . Chronic Congestive heart failure - sym

ptoms stable on lasix  - need to 

initiate referral to Dr. Mendoza - if not already done - pt to continue with 
chronic oxygen therapy - due to her weakness and need for easier to transport of
the oxygen from place to place, I have recommended that she needs a portable 
oxygen concentrator.  She is too weak to transport large oxygen bottles from 
place to place and is too weak to move them up the stairs in her home.  She has 
tripped over her oxygen tubing from the large oxygen concentrator in her house, 
therefore the extra long tubing is not safe to have in her home.  She also has 
trouble getting the oxygen tanks moved from house to vehicles and around places 
if she goes shopping.



Hypertension - uncontrolled - the patient's medications have been modified as 
documented in the visit note.  The patient has been counseled to cut back on 
salt in diet for a no added salt diet, low fat diet, start an exercise program 
with low weight bearing exercises and higher aerobic activity for heart health. 


The patient is to check blood pressure readings as an outpatient and either fax,
call, or email the readings to the office next week for practitioner to review.

The pt is to call for acute concerns.

Increase losartan from 50mg daily to 50mg twice daily.



Hypothyroidism - pt with chronic hypothyroidism, continue with current 
medication, will monitor pt for signs or symptoms of lack of adequate 
supplementation.  Pt is to continue with current dose of medication unless 
directed otherwise.  Check labs at regular intervals q 3 months or q 6 months 
based on previous levels of control.



Fasting labs to be done to be drawn by [email or web address].

 

                                        Will set up with [employer]



pt is to consider moving to assisted living



pt is to get a life alert bracelet. CHF - congestive heart failure - Pt has 
Chronic congestive heart failure - and is currently fairly well maintained on 
the current medications.  Today there is no change in the treatment course.  If 
symptoms worsen, increase edema or more that 2-3 pound weight gain over a week 
without improvement in the symptoms with a decrease in the sodium of the diet, 
then the pt is to have the office alerted.



Dementia - Pt with slowly progressive pattern.  I have discussed with pt and 
family the prognosis of this disease state and the need for the family to 
anticipate further decline with behavior changes.  Continue with current plan of
treatment.









Medical Equipment

No Medical Equipment data



Health Concerns Section

Health Concerns data not found



Goals Section

Goals data not found



Interventions Section

Interventions data not found



Health Status Evaluations/Outcomes Section

Health Status Evaluations/Outcomes data not found



Advance Directives

No Advance Directive data

## 2022-02-01 ENCOUNTER — HOSPITAL ENCOUNTER (OUTPATIENT)
Dept: HOSPITAL 75 - RAD | Age: 87
End: 2022-02-01
Attending: NURSE PRACTITIONER
Payer: MEDICARE

## 2022-02-01 DIAGNOSIS — M85.80: Primary | ICD-10-CM

## 2022-02-01 DIAGNOSIS — Z78.0: ICD-10-CM

## 2022-02-01 PROCEDURE — 77080 DXA BONE DENSITY AXIAL: CPT

## 2022-02-01 NOTE — DIAGNOSTIC IMAGING REPORT
INDICATION: 

87-year-old postmenopausal female with osteopenia.



COMPARISON: 

None.



FINDINGS:



AP Spine L1-L4:  

[BMD (g/cm2): 1.216] [T-Score: 0.1] [Z-Score: 1.3]

[BMD Previous: NA] [BMD % Change: NA]



LT Hip Neck:       

[BMD (g/cm2): 0.641] [T-Score: -2.9] [Z-Score: -0.9]



LT Hip Total:       

[BMD (g/cm2):0.762] [T-Score:-2.0] [Z-Score: -0.1]

[BMD Previous: NA] [BMD % Change: NA]



RT Hip Neck:      

[BMD (g/cm2):0.674] [T-Score:-2.6] [Z-Score:-0.6]



RT Hip Total:      

[BMD (g/cm2):0.703] [T-score:-2.4] [Z-Score:-0.6]

[BMD Previous:NA] [BMD % Change:NA]



World Health Organization criteria for BMD interpretation

classify patients as Normal (T-score at or above -1.0),

Osteopenic (T-score between -1.0 and -2.5) or Osteoporotic

(T-score at or below -2.5).



LIMITATIONS AND MODIFICATION:  

None.



FRACTURE RISK (FRAX SCORE):

The ten year probability of (%): 

Major Osteoporotic Fracture: [25.2]

Hip Fracture: [8.9]



IMPRESSION:

1. Osteopenia (Low bone mass).

2. Baseline examination.

3. See below National Osteoporosis Foundation guidelines on when

to potentially initiate pharmacologic therapy. 



Based on the National Osteoporosis Foundation Guidelines,

pharmacologic treatment should be initiated in any of the

following, unless clinical conditions suggest otherwise:



*  Any patient with prior fragility fracture of the hip or

vertebrae. A spine fracture indicates 5X risk for subsequent

spine fracture and 2X risk for subsequent hip fracture.



*  Osteoporosis (T-score <-2.5).



*  Postmenopausal women and men age 50 and older with low bone

mass/osteopenia (T-score between -1.0 and -2.5) by DXA and

10-year major osteoporotic fracture greater than 20% or a 10-year

probability of hip fracture greater than 3%. These fracture risks

are supplied above in the FRAX score, if applicable.



*  Clinician judgement and/or patient preferences may indicate

treatment for people with 10-year fracture probabilities above or

below these levels.



Dictated by: 



  Dictated on workstation # [id number]

## 2022-02-07 ENCOUNTER — HOSPITAL ENCOUNTER (EMERGENCY)
Dept: HOSPITAL 75 - ER | Age: 87
Discharge: HOME | End: 2022-02-07
Payer: MEDICARE

## 2022-02-07 VITALS — HEIGHT: 62.99 IN | WEIGHT: 199.96 LBS | BODY MASS INDEX: 35.43 KG/M2

## 2022-02-07 VITALS — DIASTOLIC BLOOD PRESSURE: 86 MMHG | SYSTOLIC BLOOD PRESSURE: 180 MMHG

## 2022-02-07 DIAGNOSIS — D64.9: ICD-10-CM

## 2022-02-07 DIAGNOSIS — Z79.82: ICD-10-CM

## 2022-02-07 DIAGNOSIS — R19.5: ICD-10-CM

## 2022-02-07 DIAGNOSIS — I50.9: ICD-10-CM

## 2022-02-07 DIAGNOSIS — J44.9: ICD-10-CM

## 2022-02-07 DIAGNOSIS — I11.0: ICD-10-CM

## 2022-02-07 DIAGNOSIS — I48.91: ICD-10-CM

## 2022-02-07 DIAGNOSIS — Z79.899: ICD-10-CM

## 2022-02-07 DIAGNOSIS — Z79.890: ICD-10-CM

## 2022-02-07 DIAGNOSIS — M79.89: Primary | ICD-10-CM

## 2022-02-07 DIAGNOSIS — E03.9: ICD-10-CM

## 2022-02-07 LAB
ALBUMIN SERPL-MCNC: 3.5 GM/DL (ref 3.2–4.5)
ALP SERPL-CCNC: 78 U/L (ref 40–136)
ALT SERPL-CCNC: 17 U/L (ref 0–55)
BASOPHILS # BLD AUTO: 0 10^3/UL (ref 0–0.1)
BASOPHILS NFR BLD AUTO: 0 % (ref 0–10)
BILIRUB SERPL-MCNC: 0.5 MG/DL (ref 0.1–1)
BUN/CREAT SERPL: 21
CALCIUM SERPL-MCNC: 8.5 MG/DL (ref 8.5–10.1)
CHLORIDE SERPL-SCNC: 103 MMOL/L (ref 98–107)
CK MB SERPL-MCNC: 1.8 NG/ML (ref ?–6.6)
CO2 SERPL-SCNC: 28 MMOL/L (ref 21–32)
CREAT SERPL-MCNC: 0.8 MG/DL (ref 0.6–1.3)
EOSINOPHIL # BLD AUTO: 0.5 10^3/UL (ref 0–0.3)
EOSINOPHIL NFR BLD AUTO: 5 % (ref 0–10)
GFR SERPLBLD BASED ON 1.73 SQ M-ARVRAT: 71 ML/MIN
GLUCOSE SERPL-MCNC: 100 MG/DL (ref 70–105)
HCT VFR BLD CALC: 25 % (ref 35–52)
HGB BLD-MCNC: 7.3 G/DL (ref 11.5–16)
LYMPHOCYTES # BLD AUTO: 1.6 10^3/UL (ref 1–4)
LYMPHOCYTES NFR BLD AUTO: 18 % (ref 12–44)
MANUAL DIFFERENTIAL PERFORMED BLD QL: NO
MCH RBC QN AUTO: 25 PG (ref 25–34)
MCHC RBC AUTO-ENTMCNC: 29 G/DL (ref 32–36)
MCV RBC AUTO: 87 FL (ref 80–99)
MONOCYTES # BLD AUTO: 0.8 10^3/UL (ref 0–1)
MONOCYTES NFR BLD AUTO: 8 % (ref 0–12)
NEUTROPHILS # BLD AUTO: 6.2 10^3/UL (ref 1.8–7.8)
NEUTROPHILS NFR BLD AUTO: 68 % (ref 42–75)
PLATELET # BLD: 409 10^3/UL (ref 130–400)
PMV BLD AUTO: 9.4 FL (ref 9–12.2)
POTASSIUM SERPL-SCNC: 3.3 MMOL/L (ref 3.6–5)
PROT SERPL-MCNC: 6.4 GM/DL (ref 6.4–8.2)
SODIUM SERPL-SCNC: 142 MMOL/L (ref 135–145)
WBC # BLD AUTO: 9.2 10^3/UL (ref 4.3–11)

## 2022-02-07 PROCEDURE — 93970 EXTREMITY STUDY: CPT

## 2022-02-07 PROCEDURE — 83874 ASSAY OF MYOGLOBIN: CPT

## 2022-02-07 PROCEDURE — 36415 COLL VENOUS BLD VENIPUNCTURE: CPT

## 2022-02-07 PROCEDURE — 80053 COMPREHEN METABOLIC PANEL: CPT

## 2022-02-07 PROCEDURE — 93005 ELECTROCARDIOGRAM TRACING: CPT

## 2022-02-07 PROCEDURE — 85025 COMPLETE CBC W/AUTO DIFF WBC: CPT

## 2022-02-07 PROCEDURE — 83880 ASSAY OF NATRIURETIC PEPTIDE: CPT

## 2022-02-07 PROCEDURE — 85379 FIBRIN DEGRADATION QUANT: CPT

## 2022-02-07 PROCEDURE — 71045 X-RAY EXAM CHEST 1 VIEW: CPT

## 2022-02-07 PROCEDURE — 82553 CREATINE MB FRACTION: CPT

## 2022-02-07 PROCEDURE — 84484 ASSAY OF TROPONIN QUANT: CPT

## 2022-02-07 PROCEDURE — 86141 C-REACTIVE PROTEIN HS: CPT

## 2022-02-07 NOTE — DIAGNOSTIC IMAGING REPORT
HISTORY: Shortness of breath.



COMPARISON: 01/25/2021.



TECHNIQUE: Frontal view of the chest.



FINDINGS:



There are stable small bilateral pleural effusions, right greater

than left, with associated airspace opacities. Right basilar

airspace opacity appears significantly increased since the prior

exam. There is stable cardiomegaly with mild central vascular

congestion. No pneumothorax is seen. There is diffuse osteopenia.



IMPRESSION:

1. Stable small bilateral pleural effusions, right greater than

left, with associated atelectasis.

2. Right basilar airspace opacity may represent atelectasis or

infiltrate.

3. Stable cardiomegaly with mild central vascular congestion.



Dictated by: 



  Dictated on workstation # [id number]

## 2022-02-07 NOTE — ED GENERAL
General


Stated Complaint:  SOA LEGS SWELLING/UNABLE TO WALK


Source of Information:  Patient, Family


Exam Limitations:  No Limitations





History of Present Illness


Date Seen by Provider:  Feb 7, 2022


Time Seen by Provider:  12:29


Initial Comments


This is a well-appearing 87-year-old female who presented to the ER via POV with

concerns of increasing shortness of breath and bilateral lower extremity 

swelling.  States that she has been having persistent swelling of her lower 

extremity for several months, she is scheduled to have outpatient venous 

ultrasound on Thursday as well as MRI of her cervical and lumbar spine. However 

the persistent swelling and pain is making it difficult for her to ambulate. No 

trauma/falls. No fever, chills, cough, shortness of breath, nausea, vomiting, 

diarrhea.





Allergies and Home Medications


Allergies


Coded Allergies:  


     Tetracyclines (Verified  Allergy, Unknown, 9/25/20)


Uncoded Allergies:  


     PCN (Allergy, Unknown, 9/25/20)





Patient Home Medication List


Home Medication List Reviewed:  Yes


Albuterol Sulfate (Albuterol Sulfate) 1.25 Mg/3 Ml Vial.neb, 1.25 MG INH Q4H PRN

for SHORTNESS OF BREATH, (Reported)


   Entered as Reported by: ORLY HAZEL on 9/28/20 1102


Albuterol Sulfate (Ventolin Hfa) 18 Gm Hfa.aer.ad, 1 PUFF IH QID


   Prescribed by: ELIJAH SOUZA on 1/25/21 0912


Ascorbic Acid/Ascorbate Sodium (Vitamin C 250 mg Tablet Chew) 250 Mg Tab.chew, 

250 MG PO DAILY, (Reported)


   Entered as Reported by: ORLY HAZEL on 9/28/20 1103


Aspirin (Aspirin) 81 Mg Tab.chew, 81 MG PO DAILY, (Reported)


   Entered as Reported by: ORLY HAZEL on 9/28/20 1103


Calcitonin [place] (Miacalcin) 400 Intlu/2 Ml Soln, 1 SPR NSEACH DAILY


   Prescribed by: JOEY HUBBARD on 1/3/22 1826


Calcium Phosphate Trib/Vit D3 (Calcium Gummies) 1 Each Tab.chew, 1 EACH PO 

DAILY, (Reported)


   Entered as Reported by: ORLY HAZEL on 9/28/20 1103


Cefdinir (Cefdinir) 300 Mg Capsule, 300 MG PO BID


   Prescribed by: ELIJAH SOUZA on 1/25/21 0912


Cetirizine HCl (Cetirizine HCl) 10 Mg Tablet, 10 MG PO DAILY, (Reported)


   Entered as Reported by: ORLY HAZEL on 11/30/20 1013


Cholecalciferol (Vitamin D3) (Vitamin D3) 125 Mcg Tablet, 125 MCG PO DAILY, 

(Reported)


   Entered as Reported by: ORLY HAZEL on 9/28/20 1103


Cyclobenzaprine HCl (Cyclobenzaprine HCl) 10 Mg Tablet, 10 MG PO Q8H PRN for 

SPASMS


   Prescribed by: JOEY HUBBARD on 1/3/22 1826


Docusate Sodium (Colace) 100 Mg Capsule, 100 MG PO DAILY


   Prescribed by: RASHEL ANN on 10/18/21 1643


Folic Acid/Multivit-Minerals (Women's Multivitamin Gummies) 200 Mcg Tab.chew, 

200 MCG PO DAILY, (Reported)


   Entered as Reported by: ORLY HAZEL on 9/28/20 1103


Furosemide (Lasix) 20 Mg Tablet, 20 MG PO DAILY


   Prescribed by: ELIJAH SOUZA on 1/25/21 0912


Hydrocodone/Acetaminophen (Hydrocodone-Acetamin 5-325 mg) 1 Each Tablet, 1 TAB 

PO Q4H PRN for PAIN-MODERATE (5-7)


   Prescribed by: RASHEL ANN on 10/18/21 1644


Levothyroxine Sodium (Levothyroxine Sodium) 125 Mcg Tablet, 125 MCG PO DAILY, 

(Reported)


   Entered as Reported by: ORLY HAZEL on 11/30/20 1013


Melatonin (Melatonin) 5 Mg Tablet, 5 MG PO HS PRN for SLEEP, (Reported)


   Entered as Reported by: ORLY HAZEL on 9/28/20 1103


Metoprolol Succinate (Metoprolol Succinate) 25 Mg Tab.er.24h, 25 MG PO DAILY


   Prescribed by: ELIJAH SOUZA on 1/25/21 0912


Potassium Bicarbonate/Cit AC (Effer-K 10 Meq Tablet Eff) 10 Meq Tablet.eff, 10 

MEQ PO DAILY, (Reported)


   Entered as Reported by: KEIRA TINSLEY on 1/22/21 1534


Tramadol HCl (Tramadol HCl) 50 Mg Tablet, 50 MG PO Q6H PRN for PAIN


   Prescribed by: JOEY HUBBARD on 1/3/22 1826





Review of Systems


Review of Systems


Constitutional:  no symptoms reported


EENTM:  no symptoms reported


Respiratory:  No cough; dyspnea on exertion; No hemoptysis, No phlegm; short of 

breath


Gastrointestinal:  no symptoms reported


Genitourinary:  no symptoms reported


Musculoskeletal:  back pain


Skin:  see HPI


Psychiatric/Neurological:  No Symptoms Reported


Hematologic/Lymphatic:  No Symptoms Reported


Immunological/Allergic:  no symptoms reported





Past Medical-Social-Family Hx


Immunizations Up To Date


Tetanus Booster (TDap):  Unknown


First/Initial COVID19 Vaccinat:  UNKNOW DATE.


Second COVID19 Vaccination Rubin:  UNKNOW DATE.





Seasonal Allergies


Seasonal Allergies:  No





Past Medical History


Surgeries:  Yes (THORACENTESIS)


Neurological


Respiratory:  Yes (PLEURAL EFFUSIONS WITH THORACENTESIS)


COPD


Cardiac:  Yes (CHF)


Atrial Fibrillation, Chronic Edema/Swelling, Hypertension


Neurological:  No


Reproductive Disorders:  No


GYN History:  Menopausal


Sexually Transmitted Disease:  No


HIV/AIDS:  No


Genitourinary:  No


Gastrointestinal:  No


Musculoskeletal:  No


Arthritis


Endocrine:  Yes


Hypothyroidsim


HEENT:  Yes


Macular Degeneration


Loss of Vision:  Denies


Hearing Impairment:  Hard of Hearing


Cancer:  No


Psychosocial:  No


Integumentary:  No


Blood Disorders:  No





Family Medical History


Heart Disease, Cancer, Diabetes, Hypertension





Physical Exam


Vital Signs





Vital Signs - First Documented








 2/7/22





 12:20


 


Temp 36.3


 


Pulse 82


 


Resp 23


 


B/P (MAP) 183/95 (124)


 


Pulse Ox 98


 


O2 Delivery Nasal Cannula


 


O2 Flow Rate 4.00





Capillary Refill :


Height, Weight, BMI


Height: '"


Weight: lbs. oz. kg; 33.00 BMI


Method:


General Appearance:  No Apparent Distress, WD/WN


Eyes:  Bilateral Eye Normal Inspection, Bilateral Eye PERRL, Bilateral Eye EOMI


HEENT:  PERRL/EOMI, TMs Normal, Normal ENT Inspection


Neck:  Normal Inspection, Supple


Respiratory:  Lungs Clear, Normal Breath Sounds, No Accessory Muscle Use


Cardiovascular:  Regular Rate, Rhythm, No Murmur


Gastrointestinal:  Normal Bowel Sounds, Non Tender, Soft


Extremity:  Normal Capillary Refill, Calf Tenderness, Inflammation (left mid-

calf ), Swelling (BLE distal to knee )


Neurologic/Psychiatric:  Alert, Oriented x3, No Motor/Sensory Deficits, Normal 

Mood/Affect


Skin:  Warm/Dry, Erythema (Left lateral lower extremity. )


Lymphatic:  No Adenopathy





Progress/Results/Core Measures


Suspected Sepsis


SIRS


Temperature: 


Pulse:  


Respiratory Rate: 


 


Laboratory Tests


2/7/22 12:40: White Blood Count 9.2


Blood Pressure  / 


Mean: 


 





Laboratory Tests


2/7/22 12:40: 


Creatinine 0.80, Platelet Count 409H, Total Bilirubin 0.5








Results/Orders


Lab Results





Laboratory Tests








Test


 2/7/22


12:40 Range/Units


 


 


White Blood Count


 9.2 


 4.3-11.0


10^3/uL


 


Red Blood Count


 2.89 L


 3.80-5.11


10^6/uL


 


Hemoglobin 7.3 L 11.5-16.0  g/dL


 


Hematocrit 25 L 35-52  %


 


Mean Corpuscular Volume 87  80-99  fL


 


Mean Corpuscular Hemoglobin 25  25-34  pg


 


Mean Corpuscular Hemoglobin


Concent 29 L


 32-36  g/dL





 


Red Cell Distribution Width 16.4 H 10.0-14.5  %


 


Platelet Count


 409 H


 130-400


10^3/uL


 


Mean Platelet Volume 9.4  9.0-12.2  fL


 


Immature Granulocyte % (Auto) 1   %


 


Neutrophils (%) (Auto) 68  42-75  %


 


Lymphocytes (%) (Auto) 18  12-44  %


 


Monocytes (%) (Auto) 8  0-12  %


 


Eosinophils (%) (Auto) 5  0-10  %


 


Basophils (%) (Auto) 0  0-10  %


 


Neutrophils # (Auto)


 6.2 


 1.8-7.8


10^3/uL


 


Lymphocytes # (Auto)


 1.6 


 1.0-4.0


10^3/uL


 


Monocytes # (Auto)


 0.8 


 0.0-1.0


10^3/uL


 


Eosinophils # (Auto)


 0.5 H


 0.0-0.3


10^3/uL


 


Basophils # (Auto)


 0.0 


 0.0-0.1


10^3/uL


 


Immature Granulocyte # (Auto)


 0.1 


 0.0-0.1


10^3/uL


 


D-Dimer


 2.27 H


 0.00-0.49


UG/ML


 


Sodium Level 142  135-145  MMOL/L


 


Potassium Level 3.3 L 3.6-5.0  MMOL/L


 


Chloride Level 103    MMOL/L


 


Carbon Dioxide Level 28  21-32  MMOL/L


 


Anion Gap 11  5-14  MMOL/L


 


Blood Urea Nitrogen 17  7-18  MG/DL


 


Creatinine


 0.80 


 0.60-1.30


MG/DL


 


Estimat Glomerular Filtration


Rate 71 


  





 


BUN/Creatinine Ratio 21   


 


Glucose Level 100    MG/DL


 


Calcium Level 8.5  8.5-10.1  MG/DL


 


Corrected Calcium 8.9  8.5-10.1  MG/DL


 


Total Bilirubin 0.5  0.1-1.0  MG/DL


 


Aspartate Amino Transf


(AST/SGOT) 23 


 5-34  U/L





 


Alanine Aminotransferase


(ALT/SGPT) 17 


 0-55  U/L





 


Alkaline Phosphatase 78    U/L


 


Creatine Kinase MB 1.8  <6.6  NG/ML


 


Myoglobin


 74.3 


 10.0-92.0


NG/ML


 


Troponin I < 0.028  <0.028  NG/ML


 


C-Reactive Protein High


Sensitivity 0.64 H


 0.00-0.50


MG/DL


 


B-Type Natriuretic Peptide 531.7 H <100.0  PG/ML


 


Total Protein 6.4  6.4-8.2  GM/DL


 


Albumin 3.5  3.2-4.5  GM/DL








My Orders





Orders - ARMAND CHOI APRN


Cbc With Automated Diff (2/7/22 12:39)


Comprehensive Metabolic Panel (2/7/22 12:39)


Fibrin Degradation Products (2/7/22 12:39)


Us Venous Lower Ext Aneesh (2/7/22 12:39)


Bnp Anya (2/7/22 12:39)


Chest 1 View, Ap/Pa Only (2/7/22 12:39)


Ekg Tracing (2/7/22 12:39)


Troponin I Anya (2/7/22 12:39)


Myoglobin Serum (2/7/22 12:39)


Creatine Kinase Mb (2/7/22 12:39)


Hs C Reactive Protein (2/7/22 12:39)


Iron Sucrose Injection (Venofer Injectio (2/7/22 14:45)





Vital Signs/I&O











 2/7/22





 12:20


 


Temp 36.3


 


Pulse 82


 


Resp 23


 


B/P (MAP) 183/95 (124)


 


Pulse Ox 98


 


O2 Delivery Nasal Cannula


 


O2 Flow Rate 4.00





Capillary Refill :





Departure


Impression





   Primary Impression:  


   Swelling of both lower extremities


   Additional Impressions:  


   Anemia


   Positive fecal occult blood test


Disposition:  01 HOME, SELF-CARE


Condition:  Improved





Departure-Patient Inst.


Decision time for Depature:  14:40


Referrals:  


ELIJAH SOUZA MD (PCP/Family)


Primary Care Physician


Patient Instructions:  Anemia Caused by Low Iron, Adult (DC)





Add. Discharge Instructions:  


Plan: 


1. Follow up with Dr. Souza later this week or next week. 


2. Return to hospital as scheduled for your outpatient MRI. 


3. Avoid using Ibuprofen as this can cause you to have increased blood loss. 


4. Return to ER for any new, concerning, or worsening symptoms.











ARMAND CHOI            Feb 7, 2022 12:39

## 2022-02-07 NOTE — DIAGNOSTIC IMAGING REPORT
PROCEDURE: US Venous Lower Ext Aneesh.



TECHNIQUE: Multiple real-time grayscale images were obtained over

the lower extremities in various projections, bilaterally.

Additional duplex Doppler and color Doppler images were also

obtained.



INDICATION: Leg swelling.



FINDINGS: There is no evidence of right or left lower extremity

DVT. Both lower extremity deep venous systems demonstrate normal

compressibility with normal response to augmentation and

Valsalva. No fluid collection or mass is detected.



IMPRESSION: No evidence of right or left lower extremity DVT.



Dictated by: 



  Dictated on workstation # [id number]

## 2022-02-10 ENCOUNTER — HOSPITAL ENCOUNTER (OUTPATIENT)
Dept: HOSPITAL 75 - RAD | Age: 87
End: 2022-02-10
Attending: NURSE PRACTITIONER
Payer: MEDICARE

## 2022-02-10 DIAGNOSIS — R60.0: ICD-10-CM

## 2022-02-10 DIAGNOSIS — M79.604: Primary | ICD-10-CM

## 2022-02-10 DIAGNOSIS — M62.3: ICD-10-CM

## 2022-02-10 DIAGNOSIS — M79.605: ICD-10-CM
